# Patient Record
Sex: FEMALE | Race: WHITE | NOT HISPANIC OR LATINO | Employment: OTHER | URBAN - METROPOLITAN AREA
[De-identification: names, ages, dates, MRNs, and addresses within clinical notes are randomized per-mention and may not be internally consistent; named-entity substitution may affect disease eponyms.]

---

## 2018-01-16 ENCOUNTER — TELEPHONE (OUTPATIENT)
Dept: OBSTETRICS AND GYNECOLOGY | Facility: CLINIC | Age: 48
End: 2018-01-16

## 2018-01-17 ENCOUNTER — HOSPITAL ENCOUNTER (OUTPATIENT)
Dept: RADIOLOGY | Facility: OTHER | Age: 48
Discharge: HOME | End: 2018-01-17
Attending: OBSTETRICS & GYNECOLOGY
Payer: COMMERCIAL

## 2018-01-17 ENCOUNTER — OFFICE VISIT (OUTPATIENT)
Dept: OBSTETRICS AND GYNECOLOGY | Facility: CLINIC | Age: 48
End: 2018-01-17
Attending: OBSTETRICS & GYNECOLOGY
Payer: COMMERCIAL

## 2018-01-17 VITALS
DIASTOLIC BLOOD PRESSURE: 78 MMHG | SYSTOLIC BLOOD PRESSURE: 112 MMHG | BODY MASS INDEX: 20.94 KG/M2 | WEIGHT: 118.19 LBS | HEIGHT: 63 IN

## 2018-01-17 DIAGNOSIS — N92.6 IRREGULAR MENSES: ICD-10-CM

## 2018-01-17 DIAGNOSIS — Z12.4 SCREENING FOR CERVICAL CANCER: ICD-10-CM

## 2018-01-17 DIAGNOSIS — Z12.39 SCREENING FOR BREAST CANCER: ICD-10-CM

## 2018-01-17 DIAGNOSIS — N95.1 MENOPAUSAL SYMPTOM: Primary | ICD-10-CM

## 2018-01-17 DIAGNOSIS — Z01.419 ENCOUNTER FOR GYNECOLOGICAL EXAMINATION WITHOUT ABNORMAL FINDING: ICD-10-CM

## 2018-01-17 PROCEDURE — 77067 SCR MAMMO BI INCL CAD: CPT | Mod: 26,,, | Performed by: RADIOLOGY

## 2018-01-17 PROCEDURE — 88175 CYTOPATH C/V AUTO FLUID REDO: CPT

## 2018-01-17 PROCEDURE — 99386 PREV VISIT NEW AGE 40-64: CPT | Mod: S$GLB,,, | Performed by: OBSTETRICS & GYNECOLOGY

## 2018-01-17 PROCEDURE — 77067 SCR MAMMO BI INCL CAD: CPT | Mod: TC

## 2018-01-17 PROCEDURE — 77063 BREAST TOMOSYNTHESIS BI: CPT | Mod: 26,,, | Performed by: RADIOLOGY

## 2018-01-17 RX ORDER — NORGESTIMATE AND ETHINYL ESTRADIOL 0.25-0.035
KIT ORAL
COMMUNITY
Start: 2018-01-05 | End: 2018-01-29

## 2018-01-17 RX ORDER — NORGESTIMATE AND ETHINYL ESTRADIOL 0.25-0.035
1 KIT ORAL DAILY
Qty: 30 TABLET | Refills: 11 | Status: SHIPPED | OUTPATIENT
Start: 2018-01-17 | End: 2018-01-29

## 2018-01-17 RX ORDER — SUMATRIPTAN SUCCINATE 100 MG/1
100 TABLET ORAL ONCE
COMMUNITY
Start: 2017-11-14 | End: 2021-12-06 | Stop reason: SDUPTHER

## 2018-01-17 RX ORDER — SUMATRIPTAN SUCCINATE 100 MG/1
100 TABLET ORAL ONCE AS NEEDED
Qty: 30 TABLET | Refills: 2 | Status: SHIPPED | OUTPATIENT
Start: 2018-01-17 | End: 2018-01-17

## 2018-01-17 RX ORDER — CLINDAMYCIN PHOSPHATE AND BENZOYL PEROXIDE 10; 50 MG/G; MG/G
GEL TOPICAL
COMMUNITY
Start: 2018-01-11 | End: 2019-04-04

## 2018-01-17 RX ORDER — SPIRONOLACTONE 100 MG/1
100 TABLET, FILM COATED ORAL DAILY
COMMUNITY
Start: 2018-01-11

## 2018-01-17 NOTE — PROGRESS NOTES
SUBJECTIVE:   47 y.o. female No obstetric history on file.  for annual routine Pap and checkup. Patient's last menstrual period was 2018 (within days)..  She reports worseining migraines. For years, she reports adding Estradiol patch the week of her period- she now has spotting and migraines are worse. She is also on OCPs. She reports hot flashes and night sweats the week of her period. .        History reviewed. No pertinent past medical history.  Past Surgical History:   Procedure Laterality Date     SECTION      triplets     Social History     Social History    Marital status:      Spouse name: N/A    Number of children: N/A    Years of education: N/A     Occupational History    Not on file.     Social History Main Topics    Smoking status: Never Smoker    Smokeless tobacco: Never Used    Alcohol use No    Drug use: No    Sexual activity: Yes     Partners: Male     Other Topics Concern    Not on file     Social History Narrative    No narrative on file     Family History   Problem Relation Age of Onset    Breast cancer Maternal Grandmother      great granmother      OB History   No data available         Current Outpatient Prescriptions   Medication Sig Dispense Refill    ESTRADIOL TRANSDERMAL PATCH TD Place onto the skin.      clindamycin-benzoyl peroxide gel       MONO-LINYAH 0.25-35 mg-mcg per tablet       spironolactone (ALDACTONE) 100 MG tablet       sumatriptan (IMITREX) 100 MG tablet        No current facility-administered medications for this visit.      Allergies: Bactrim [sulfamethoxazole-trimethoprim]     ROS:  Constitutional: no weight loss, weight gain, fever, fatigue  Eyes:  No vision changes, glasses/contacts  ENT/Mouth: No ulcers, sinus problems, ears ringing, +headache  Cardiovascular: No inability to lie flat, chest pain, exercise intolerance, swelling, heart palpitations  Respiratory: No wheezing, coughing blood, shortness of breath, or  cough  Gastrointestinal: No diarrhea, bloody stool, nausea/vomiting, constipation, gas, hemorrhoids  Genitourinary: No blood in urine, painful urination, urgency of urination, frequency of urination, incomplete emptying, incontinence, abnormal bleeding, painful periods, heavy periods, vaginal discharge, vaginal odor, painful intercourse, sexual problems, bleeding after intercourse.  Musculoskeletal: No muscle weakness  Skin/Breast: No painful breasts, nipple discharge, masses, rash, ulcers  Neurological: No passing out, seizures, numbness, headache  Endocrine: No diabetes, hypothyroid, hyperthyroid,+ hot flashes, hair loss, abnormal hair growth, acne  Psychiatric: No depression, crying  Hematologic: No bruises, bleeding, swollen lymph nodes, anemia.      Physical Exam:   Constitutional: She is oriented to person, place, and time. She appears well-developed and well-nourished.      Neck: Normal range of motion. No tracheal deviation present. No thyromegaly present.    Cardiovascular: Exam reveals no edema.     Pulmonary/Chest: Effort normal. She exhibits no mass, no tenderness, no deformity and no retraction. Right breast exhibits no inverted nipple, no mass, no nipple discharge, no skin change, no tenderness, presence, no bleeding and no swelling. Left breast exhibits no inverted nipple, no mass, no nipple discharge, no skin change, no tenderness, presence, no bleeding and no swelling. Breasts are symmetrical.        Abdominal: Soft. She exhibits no distension and no mass. There is no tenderness. There is no rebound and no guarding. No hernia. Hernia confirmed negative in the left inguinal area.     Genitourinary: Vagina normal and uterus normal. Rectal exam shows no external hemorrhoid. There is no rash, tenderness or lesion on the right labia. There is no rash, tenderness or lesion on the left labia. Uterus is not deviated. Cervix is normal. No no adexnal prolapse. Right adnexum displays no mass, no tenderness and  no fullness. Left adnexum displays no mass, no tenderness and no fullness. No tenderness, bleeding, rectocele, cystocele or unspecified prolapse of vaginal walls in the vagina. No vaginal discharge found. Cervix exhibits no motion tenderness, no discharge and no friability.           Musculoskeletal: Normal range of motion and moves all extremeties. She exhibits no edema.      Lymphadenopathy:        Right: No inguinal adenopathy present.        Left: No inguinal adenopathy present.    Neurological: She is alert and oriented to person, place, and time.    Skin: No rash noted. No erythema. No pallor.    Psychiatric: She has a normal mood and affect. Her behavior is normal. Judgment and thought content normal.         ASSESSMENT:   well woman  no contraindication to continue use of oral contraceptives    PLAN:   mammogram  pap smear  Stop OCPs for 1 month then get labs  Patient may benefit from progesterone for her headaches

## 2018-01-22 ENCOUNTER — HOSPITAL ENCOUNTER (OUTPATIENT)
Dept: RADIOLOGY | Facility: OTHER | Age: 48
Discharge: HOME | End: 2018-01-22
Attending: OBSTETRICS & GYNECOLOGY
Payer: COMMERCIAL

## 2018-01-22 DIAGNOSIS — R92.8 ABNORMAL MAMMOGRAM: ICD-10-CM

## 2018-01-22 PROCEDURE — 77065 DX MAMMO INCL CAD UNI: CPT | Mod: TC

## 2018-01-22 PROCEDURE — 76642 ULTRASOUND BREAST LIMITED: CPT | Mod: 26,RT,, | Performed by: RADIOLOGY

## 2018-01-22 PROCEDURE — 77061 BREAST TOMOSYNTHESIS UNI: CPT | Mod: 26,,, | Performed by: RADIOLOGY

## 2018-01-22 PROCEDURE — 76642 ULTRASOUND BREAST LIMITED: CPT | Mod: TC,RT

## 2018-01-22 PROCEDURE — 77065 DX MAMMO INCL CAD UNI: CPT | Mod: 26,,, | Performed by: RADIOLOGY

## 2018-01-22 PROCEDURE — 77061 BREAST TOMOSYNTHESIS UNI: CPT | Mod: TC

## 2018-01-24 ENCOUNTER — TELEPHONE (OUTPATIENT)
Dept: OBSTETRICS AND GYNECOLOGY | Facility: CLINIC | Age: 48
End: 2018-01-24

## 2018-01-24 NOTE — TELEPHONE ENCOUNTER
Pt states she would like to schedule her blood work. Pt states will take her last pill on Saturday . Scheduled pt to have blood work on 2/26 at 8am

## 2018-01-24 NOTE — TELEPHONE ENCOUNTER
----- Message from Markus Copeland sent at 1/23/2018  4:49 PM CST -----  Contact: Pt  X_ 1st Request  _ 2nd Request  _ 3rd Request    Who: KAL GOETZ [[id number]]    Why: Patient stated she needed to inform the staff that Saturday she will be 21 days in. Would like to know when to schedule blood work    What Number to Call Back: [phone]    When to Expect a call back: (Before the end of the day)  -- if call after 3:00 call back will be tomorrow.

## 2018-01-25 ENCOUNTER — HOSPITAL ENCOUNTER (OUTPATIENT)
Dept: RADIOLOGY | Facility: OTHER | Age: 48
Discharge: HOME | End: 2018-01-25
Attending: OBSTETRICS & GYNECOLOGY
Payer: COMMERCIAL

## 2018-01-25 DIAGNOSIS — N63.0 LUMP OR MASS IN BREAST: Primary | ICD-10-CM

## 2018-01-25 DIAGNOSIS — R92.8 ABNORMAL MAMMOGRAM: ICD-10-CM

## 2018-01-25 PROCEDURE — 77065 DX MAMMO INCL CAD UNI: CPT | Mod: 26,RT,, | Performed by: RADIOLOGY

## 2018-01-25 PROCEDURE — 19083 BX BREAST 1ST LESION US IMAG: CPT

## 2018-01-25 PROCEDURE — 77065 DX MAMMO INCL CAD UNI: CPT | Mod: TC,RT

## 2018-01-25 PROCEDURE — 88360 TUMOR IMMUNOHISTOCHEM/MANUAL: CPT | Mod: 26,,, | Performed by: PATHOLOGY

## 2018-01-25 PROCEDURE — 19083 BX BREAST 1ST LESION US IMAG: CPT | Mod: RT,,, | Performed by: RADIOLOGY

## 2018-01-25 PROCEDURE — 88305 TISSUE EXAM BY PATHOLOGIST: CPT | Performed by: PATHOLOGY

## 2018-01-25 PROCEDURE — 88305 TISSUE EXAM BY PATHOLOGIST: CPT | Mod: 26,,, | Performed by: PATHOLOGY

## 2018-01-25 PROCEDURE — 88360 TUMOR IMMUNOHISTOCHEM/MANUAL: CPT | Performed by: PATHOLOGY

## 2018-01-25 PROCEDURE — 27201044 US BREAST BIOPSY WITH IMAGING 1ST SITE RIGHT

## 2018-01-25 PROCEDURE — 25000003 PHARM REV CODE 250: Performed by: OBSTETRICS & GYNECOLOGY

## 2018-01-25 RX ORDER — LIDOCAINE HYDROCHLORIDE AND EPINEPHRINE 20; 10 MG/ML; UG/ML
10 INJECTION, SOLUTION INFILTRATION; PERINEURAL ONCE
Status: COMPLETED | OUTPATIENT
Start: 2018-01-25 | End: 2018-01-25

## 2018-01-25 RX ORDER — LIDOCAINE HYDROCHLORIDE 10 MG/ML
5 INJECTION INFILTRATION; PERINEURAL ONCE
Status: COMPLETED | OUTPATIENT
Start: 2018-01-25 | End: 2018-01-25

## 2018-01-25 RX ADMIN — LIDOCAINE HYDROCHLORIDE AND EPINEPHRINE 10 ML: 20; 10 INJECTION, SOLUTION INFILTRATION; PERINEURAL at 02:01

## 2018-01-25 RX ADMIN — LIDOCAINE HYDROCHLORIDE 5 ML: 10 INJECTION, SOLUTION INFILTRATION; PERINEURAL at 02:01

## 2018-01-25 NOTE — PLAN OF CARE
Pt stable after US Guided Biopsy of RIGHT Breast. No visible bleeding at site. Biopsy site covered with steri-strips, CDI. Pt escorted to post op mammography exam by nurse and family. Post biopsy education discussed prior to biopsy by nurse and will be readdressed by mammography tech before discharge. Pt verbalized instructions given by nurse. Betty

## 2018-01-29 ENCOUNTER — OFFICE VISIT (OUTPATIENT)
Dept: OBSTETRICS AND GYNECOLOGY | Facility: CLINIC | Age: 48
End: 2018-01-29
Attending: OBSTETRICS & GYNECOLOGY
Payer: COMMERCIAL

## 2018-01-29 ENCOUNTER — LAB VISIT (OUTPATIENT)
Dept: LAB | Facility: OTHER | Age: 48
End: 2018-01-29
Attending: OBSTETRICS & GYNECOLOGY
Payer: COMMERCIAL

## 2018-01-29 ENCOUNTER — TELEPHONE (OUTPATIENT)
Dept: RADIOLOGY | Facility: OTHER | Age: 48
End: 2018-01-29

## 2018-01-29 ENCOUNTER — OFFICE VISIT (OUTPATIENT)
Dept: SURGERY | Facility: CLINIC | Age: 48
End: 2018-01-29
Payer: COMMERCIAL

## 2018-01-29 VITALS — BODY MASS INDEX: 19.28 KG/M2 | HEIGHT: 64 IN

## 2018-01-29 VITALS
BODY MASS INDEX: 19.9 KG/M2 | SYSTOLIC BLOOD PRESSURE: 119 MMHG | WEIGHT: 112.31 LBS | TEMPERATURE: 97 F | DIASTOLIC BLOOD PRESSURE: 76 MMHG | HEIGHT: 63 IN | HEART RATE: 76 BPM

## 2018-01-29 DIAGNOSIS — Z13.79 GENETIC TESTING: ICD-10-CM

## 2018-01-29 DIAGNOSIS — Z71.83 ENCOUNTER FOR NONPROCREATIVE GENETIC COUNSELING: ICD-10-CM

## 2018-01-29 DIAGNOSIS — C50.811 MALIGNANT NEOPLASM OF OVERLAPPING SITES OF RIGHT FEMALE BREAST, UNSPECIFIED ESTROGEN RECEPTOR STATUS: Primary | ICD-10-CM

## 2018-01-29 DIAGNOSIS — C50.911 MALIGNANT NEOPLASM OF RIGHT FEMALE BREAST, UNSPECIFIED ESTROGEN RECEPTOR STATUS, UNSPECIFIED SITE OF BREAST: Primary | ICD-10-CM

## 2018-01-29 DIAGNOSIS — C50.911 MALIGNANT NEOPLASM OF RIGHT FEMALE BREAST, UNSPECIFIED ESTROGEN RECEPTOR STATUS, UNSPECIFIED SITE OF BREAST: ICD-10-CM

## 2018-01-29 PROCEDURE — 99214 OFFICE O/P EST MOD 30 MIN: CPT | Mod: S$GLB,,, | Performed by: OBSTETRICS & GYNECOLOGY

## 2018-01-29 PROCEDURE — 99205 OFFICE O/P NEW HI 60 MIN: CPT | Mod: S$GLB,,, | Performed by: SURGERY

## 2018-01-29 PROCEDURE — 3008F BODY MASS INDEX DOCD: CPT | Mod: S$GLB,,, | Performed by: OBSTETRICS & GYNECOLOGY

## 2018-01-29 PROCEDURE — 3008F BODY MASS INDEX DOCD: CPT | Mod: S$GLB,,, | Performed by: SURGERY

## 2018-01-29 PROCEDURE — 36415 COLL VENOUS BLD VENIPUNCTURE: CPT

## 2018-01-29 NOTE — Clinical Note
Amanda,   So glad to see her receptors have resulted ER+CA+H2N-. Thanks again for sending her and for getting her genetics sent right away!  That will really help with planning.  ThanksLuz Marina

## 2018-01-31 NOTE — PROGRESS NOTES
"  Chief Complaint:  Genetic testing      History of Present Illness  Roberta Wheat is a 47 y.o.  Female seen today in clinic.   She has recently been diagnosed with infiltrating ductal carcinoma of the right breast.She met with Dr. Montiel earlier today- she is awaiting receptor status. . She has a maternal great grandmother who had breast cancer is her 70's.       Additional Information:      Height:  5'4"  Weight: 112 pounds  Menarche age:11  First live birth age:32 triplets  Oral contraceptive use yes  HRT use: no   Date of Last Mammogram:2018 (screening)  Biopsy- infiltrating ductal carcinoma right breast      History reviewed. No pertinent past medical history.  Past Surgical History:   Procedure Laterality Date     SECTION      triplets     Social History     Social History    Marital status:      Spouse name: N/A    Number of children: N/A    Years of education: N/A     Occupational History    Not on file.     Social History Main Topics    Smoking status: Never Smoker    Smokeless tobacco: Never Used    Alcohol use No    Drug use: No    Sexual activity: Yes     Partners: Male     Other Topics Concern    Not on file     Social History Narrative    No narrative on file     Family History   Problem Relation Age of Onset    Breast cancer Maternal Grandmother      great granmother     Cancer Neg Hx     Colon cancer Neg Hx     Ovarian cancer Neg Hx      OB History    Para Term  AB Living   2 2 2         SAB TAB Ectopic Multiple Live Births                  # Outcome Date GA Lbr Mike/2nd Weight Sex Delivery Anes PTL Lv   2 Term            1 Term                     Current Outpatient Prescriptions   Medication Sig Dispense Refill    clindamycin-benzoyl peroxide gel       spironolactone (ALDACTONE) 100 MG tablet       sumatriptan (IMITREX) 100 MG tablet        No current facility-administered medications for this visit.      Allergies: Bactrim " [sulfamethoxazole-trimethoprim]         Review of Systems  Review of Systems     ROS:  Constitutional: no weight loss, weight gain, fever, fatigue  Gastrointestinal: No diarrhea, bloody stool, nausea/vomiting, constipation, gas, hemorrhoids  Genitourinary: No blood in urine, painful urination, urgency of urination, frequency of urination, incomplete emptying, incontinence, abnormal bleeding, painful periods, heavy periods, vaginal discharge, vaginal odor, painful intercourse, sexual problems, bleeding after intercourse.  Musculoskeletal: No muscle weakness  Skin/Breast: No painful breasts, nipple discharge, masses, rash, ulcers  Neurological: No passing out, seizures, numbness, headache  Endocrine: No diabetes, hypothyroid, hyperthyroid, hot flashes, hair loss, abnormal hair growth, acne  Psychiatric: No depression, crying  Hematologic: No bruises, bleeding, swollen lymph nodes, anemia.       Objective:    Physical Exam     deferred  Assessment & Plan:       This patients  personal history  is suggestive of a hereditary cancer syndrome. She meets the criteria for genetic testing established by medical society guidelines and I recommended she pursue testing based on   these criteria.     Face to face time spent with patient 25 minutes  More than 90% of time spent in counseling and coordinating follow up for patient.    Today we discussed how genetics may affect cancer susceptibility and the options, alternatives, benefits, limitations and   potential outcomes of genetic testing for hereditary cancer syndromes. The genetic test recommended for this patient today is the [employer] ®     Hereditary Cancer Panel. [facility] analyzes 28 genes including BRCA1, BRCA2,   MLH1, MSH2, MSH6, and PMS2 to identify germline genetic mutations that cause an increased risk for eight primary   cancers including: breast, ovarian, endometrial, colorectal, gastric, pancreatic, prostate, and melanomas. Due to significant clinical  overlap in hereditary cancer susceptibility genes, a molecular diagnosis of a hereditary cancer condition is necessary to clarify the cancer risks this patient carries and the screening, management, and treatment options most   appropriate for her.   In our pre-test counseling, today, we discussed the possible results of genetic testing: a positive result would mean that a mutation known to be associated with a higher risk of cancer was identified and a negative result would mean that no mutation known to increase the risk of cancer was identified. We also discussed that a variant of uncertain significance   (VUS) may be reported. A VUS is a genetic change identified in a gene, but it is unclear if this change causes an increase   in cancer risk normally associated with mutations in the gene. Due to the clinical uncertainty of this type of change, VUSs   are not used to make medical management decisions for a patient. Finally, I advised the patient that her medical management may change based on these results and may include increased surveillance, use of chemoprevention, prophylactic surgery, or modifications to lifestyle.   After our discussion, the patient elected to proceed with the [facility]   ® Hereditary Cancer Panel test today and she was given information on this test to keep for her records. The patient provided informed consent. She understands the potential results of this testing and their implications for medical management and relatives. A blood sample was collected, and sent to [facility].      A total of 25 minutes were spent discussing genetic testing and hereditary cancer syndromes with the patient today.        Plan:      Follow up for results as scheduled

## 2018-02-02 DIAGNOSIS — I10 HYPERTENSION, UNSPECIFIED TYPE: Primary | ICD-10-CM

## 2018-02-05 ENCOUNTER — LAB VISIT (OUTPATIENT)
Dept: LAB | Facility: OTHER | Age: 48
End: 2018-02-05
Attending: SURGERY
Payer: COMMERCIAL

## 2018-02-05 DIAGNOSIS — I10 HYPERTENSION, UNSPECIFIED TYPE: ICD-10-CM

## 2018-02-05 PROBLEM — C50.811 MALIGNANT NEOPLASM OF OVERLAPPING SITES OF RIGHT FEMALE BREAST: Status: ACTIVE | Noted: 2018-02-05

## 2018-02-05 LAB
BUN SERPL-MCNC: 16 MG/DL
CREAT SERPL-MCNC: 0.8 MG/DL
EST. GFR  (AFRICAN AMERICAN): >60 ML/MIN/1.73 M^2
EST. GFR  (NON AFRICAN AMERICAN): >60 ML/MIN/1.73 M^2

## 2018-02-05 PROCEDURE — 84520 ASSAY OF UREA NITROGEN: CPT

## 2018-02-05 PROCEDURE — 36415 COLL VENOUS BLD VENIPUNCTURE: CPT

## 2018-02-05 PROCEDURE — 82565 ASSAY OF CREATININE: CPT

## 2018-02-05 NOTE — PROGRESS NOTES
Breast Surgery  [facility]  Department of Surgery      REFERRING PROVIDER: Luz Marina Montiel MD  [address] [place] [address]    Chief Complaint: Consult      Subjective:      Patient ID: Roberta Wheat is a 47 y.o. female who presents with newly diagnosed IDC, grade II, receptors pending.      Patient does not routinely do self breast exams.  Patient has not noted a change on breast exam.  Patient denies nipple discharge. Patient denies to previous breast biopsy. Patient denies a personal history of breast cancer.    Findings at that time were the following:   Tumor size: 1.8 cm   Tumor rdgrdrrdarddrderd:rd rd3rd Estrogen Receptor: ?   Progesterone Receptor: ?   Her-2 britta: ?   Lymph node status: clinically negative   Lymphatic invasion: no     GYN History:  Age of menarche was 11. Pre-menopausal. Patient admits to hormonal therapy for fertility difficulty. She quit taking OCPs last week with biopsy.  She typically uses an estradiol patch the week of her cycles.  Patient is , triplet girls and one miscarriage. Age of first live birth was 32.     Past Medical History:   Diagnosis Date    Migraine      Past Surgical History:   Procedure Laterality Date     SECTION      triplets     Current Outpatient Prescriptions on File Prior to Visit   Medication Sig Dispense Refill    clindamycin-benzoyl peroxide gel       spironolactone (ALDACTONE) 100 MG tablet       sumatriptan (IMITREX) 100 MG tablet        No current facility-administered medications on file prior to visit.      Social History     Social History    Marital status:      Spouse name: N/A    Number of children: N/A    Years of education: N/A     Occupational History    Not on file.     Social History Main Topics    Smoking status: Never Smoker    Smokeless tobacco: Never Used    Alcohol use No    Drug use: No    Sexual activity: Yes     Partners: Male     Other Topics Concern    Not on file     Social History  "Narrative    No narrative on file     Family History   Problem Relation Age of Onset    Breast cancer Maternal Grandmother      great granmother     Cancer Neg Hx     Colon cancer Neg Hx     Ovarian cancer Neg Hx         Review of Systems   Constitutional: Negative for appetite change, chills, fever and unexpected weight change.   HENT: Negative for facial swelling, postnasal drip and sore throat.    Eyes: Negative for redness and itching.   Respiratory: Negative for chest tightness and shortness of breath.    Cardiovascular: Negative for chest pain and palpitations.   Gastrointestinal: Negative for blood in stool, diarrhea, nausea and vomiting.   Genitourinary: Negative for difficulty urinating and dysuria.   Musculoskeletal: Negative for arthralgias and joint swelling.   Skin: Negative for rash and wound.   Neurological: Negative for dizziness and syncope.   Hematological: Negative for adenopathy.   Psychiatric/Behavioral: Negative for agitation. The patient is not nervous/anxious.      Objective:   /76 (BP Location: Right arm, Patient Position: Sitting, BP Method: Medium (Automatic))   Pulse 76   Temp 96.9 °F (36.1 °C) (Oral)   Ht 5' 3" (1.6 m)   Wt 51 kg (112 lb 5.2 oz)   LMP 01/04/2018   BMI 19.90 kg/m²      Physical Exam   Constitutional: She appears well-developed and well-nourished.   HENT:   Head: Normocephalic.   Eyes: No scleral icterus.   Neck: Neck supple. No tracheal deviation present.   Cardiovascular: Normal rate and regular rhythm.    Pulmonary/Chest: Breath sounds normal. No respiratory distress. Right breast exhibits no inverted nipple, no mass, no nipple discharge and no skin change. Left breast exhibits no inverted nipple, no mass, no nipple discharge and no skin change.       Abdominal: Soft. She exhibits no mass. There is no tenderness.   Musculoskeletal: She exhibits no edema.   Lymphadenopathy:     She has no cervical adenopathy.   Neurological: She is alert.   Skin: No rash " noted. No erythema.     Psychiatric: She has a normal mood and affect.       Radiology review: Images personally reviewed by me in the clinic.   Mammogram:Findings:  This procedure was performed using tomosynthesis.  Computer-aided detection was utilized in the interpretation of this examination.  The breast is extremely dense, which lowers the sensitivity of mammography.      Mammo Digital Diagnostic Right with Tomosynthesis_CAD  Right  Mass: There is an 18 mm mass with spiculated margins seen in the right breast at 12 o'clock. The mass correlates with the screening mammogram finding.      US Breast Right Limited  Right  Mass: There is a 9 mm x 7 mm x 8 mm irregularly shaped, hypoechoic mass with angular margins seen in the right breast at 12 o'clock in the middle depth, 3 cm from the nipple. The mass correlates with the mammogram finding.      Impression:  Right  Mass: Right breast 18 mm mass at the 12 o'clock position. Assessment: 4B - Suspicious finding. Ultrasound Biopsy is recommended.      The findings and recommendations were discussed in detail with the patient.      BI-RADS Category:   Right: 4B - Moderate Suspicion for Malignancy  Overall: 4B - Moderate Suspicion for Malignancy     Recommendation:  Biopsy is recommended.     The patient's estimated lifetime risk of breast cancer (to age 85) based on Tyrer-Cuzick - 7 risk assessment model is: Tyrer-Cuzick: 14.94 %. According to the American Cancer Society,  patients with a lifetime breast cancer risk of 20% or higher might benefit from supplemental screening tests.      PATH:  FINAL PATHOLOGIC DIAGNOSIS  BREAST (RIGHT 12:00, CLINICAL): INVASIVE DUCTAL CARCINOMA.  Note: Ribbon sections of the specimen were examined microscopically at deeper levels.  The invasive tumor appears moderately differentiated (3, 3, 1) and is represented in 2 biopsies with the largest  9mm. There are also areas of carcinoma in situ. Hormone receptor assay results will be issued as  a supplemental  report.      Assessment:       1. Malignant neoplasm of overlapping sites of right female breast, unspecified estrogen receptor status        Plan:     Options for management were discussed with the patient and her family. We reviewed the existing data noting the equivalency of breast conserving surgery with radiation therapy and mastectomy. We also reviewed the guidelines of the National Comprehensive Cancer Network for Stage IA breast carcinoma. We discussed the need for lumpectomy margins to be negative for carcinoma, the necessity for postoperative radiation therapy after breast conservation in most cases, the possibility of a failed or false negative sentinel lymph node biopsy and the potential need for complete lymphadenectomy for a failed or positive sentinel lymph node biopsy were fully discussed. In the setting of mastectomy, delayed or immediate reconstruction options are available and were discussed.     In the setting of lumpectomy, radiation therapy would be recommended majority of the time.  The duration and treatment side effects were discussed with the patient.  This will coordinated with the radiation oncologist pending final pathology.    We also discussed the role of systemic therapy in the treatment of early stage breast cancer.  We discussed that this is based on tumor biology and shannan status and will be determined based on final pathology.  We discussed that if the cancer is hormone positive, endocrine therapy would be recommended in most cases and its use can reduce the risk of recurrence as well as improve survival. Side effects of treatment were briefly discussed. We also discussed the potential role for chemotherapy based on a number of factors such as tumor phenotype (ER+ vs. triple negative vs. [id number]+) and this would be determined in coordination with the medical oncologist.    The patient, in consultation with her family, has elected to proceed with MRI due to her  breast density, the size discrepancy from MMG to US and she may be considering nipple sparing mastectomy.    Receptors pending - Will follow up.  Genetic testing to be performed - Dr. Morris - already established.  She is undecided on what she would choose surgically but understands that her receptors could heavily influence our plan and so could her genetic results.    Patient was educated on breast cancer, receptors, wire localization lumpectomy, mastectomy, sentinel lymph node mapping and biopsy, axillary lymph node dissection, reconstruction, breast prosthesis with post-mastectomy bra and radiation therapy. Patient was given patient information binder including [facility] breast cancer treatment brochure.  All her questions were answered.    Total time spent with the patient: 65 minutes.  45 minutes of face to face consultation and 20 minutes of chart review and coordination of care.

## 2018-02-09 ENCOUNTER — HOSPITAL ENCOUNTER (OUTPATIENT)
Dept: RADIOLOGY | Facility: OTHER | Age: 48
Discharge: HOME | End: 2018-02-09
Attending: SURGERY
Payer: COMMERCIAL

## 2018-02-09 DIAGNOSIS — C50.811 MALIGNANT NEOPLASM OF OVERLAPPING SITES OF RIGHT FEMALE BREAST, UNSPECIFIED ESTROGEN RECEPTOR STATUS: ICD-10-CM

## 2018-02-09 PROCEDURE — 77059 MRI BREAST BILATERAL: CPT | Mod: 26,,, | Performed by: RADIOLOGY

## 2018-02-09 PROCEDURE — 25500020 PHARM REV CODE 255: Performed by: SURGERY

## 2018-02-09 PROCEDURE — 0159T MRI BREAST BILATERAL: CPT | Mod: 26,,, | Performed by: RADIOLOGY

## 2018-02-09 PROCEDURE — A9577 INJ MULTIHANCE: HCPCS | Performed by: SURGERY

## 2018-02-09 PROCEDURE — 0159T MRI BREAST BILATERAL: CPT | Mod: TC

## 2018-02-09 RX ADMIN — GADOBENATE DIMEGLUMINE 10 ML: 529 INJECTION, SOLUTION INTRAVENOUS at 02:02

## 2018-02-11 ENCOUNTER — PATIENT MESSAGE (OUTPATIENT)
Dept: OBSTETRICS AND GYNECOLOGY | Facility: CLINIC | Age: 48
End: 2018-02-11

## 2018-02-12 ENCOUNTER — TELEPHONE (OUTPATIENT)
Dept: SURGERY | Facility: CLINIC | Age: 48
End: 2018-02-12

## 2018-02-12 NOTE — TELEPHONE ENCOUNTER
----- Message from Luz Marina Montiel MD sent at 2/10/2018 12:53 PM CST -----  Arianna,     They did not complete the MRI read on this patient, but she will be expecting a call..  If possible can you check on this to let her know this week?  Was supposedly a small tumor but looks like there is a little more to the story on the MRI (not sure).  If so, could ask Nick to call her or I can send her a message if you aren't comfortable, but if a large area is affected and we need to go the mastectomy route or obtain another biopsy, she needs to know so that we can get those things scheduled.    THanks!  Luz Marina

## 2018-02-12 NOTE — TELEPHONE ENCOUNTER
"Called patient to discuss MRI results. Patient is at [place], she states she would still like to go over the results right now.      Went over the known finding and that on the MRI it was measured around 12mm, which is slightly larger then the US measurement of 9mm. Explained to patient that it is common to have slight differences in size between imaging modalities.     Then discussed area surrounding known mass and that it was described as a "non-mass enhancement." Discussed what this means, the diameter of this area as measured by the MRI, why it may not have shown up on US or mammogram. We discussed that biopsy was recommended if patient decides to pursue lumpectomy, to determine if this entire area needs to be removed. Also discussed that this goes to the nipple, and may cause a nipple sparing mastectomy to be impossible if it is proven to be malignant. Biopsy would be needed in order to determine if nipple sparing mastectomy is possible.     Finally, discussed additional LIQ area also described as NME. Explained to patient that this will also need to be biopsied if lumpectomy is desired. We discussed the possibility of two localized lumpectomies, depending on biopsy results and Dr. Montiel's opinion of whether or not they can both be removed and still produce a pleasing cosmetic result.     Stressed to patient that the two main positive results in this MRI were that the contralateral breast did not show any abnormalities, and there was no abnormal lymph nodes visualized.     Offered patient the option of speaking with one of Dr. Montiel's colleagues on Wednesday regarding these two new areas. Also offered to set patient up to come back next week and talk to Dr. Montiel in person.     Patient is in [place] until Friday. Patient requests that I send her a copy of the report, so she can look at it with her , and also provide my contact information so she can call me back to let me know what she would " like to do.

## 2018-02-15 ENCOUNTER — TELEPHONE (OUTPATIENT)
Dept: OBSTETRICS AND GYNECOLOGY | Facility: CLINIC | Age: 48
End: 2018-02-15

## 2018-02-15 NOTE — TELEPHONE ENCOUNTER
----- Message from Mily Morris MD sent at 2/11/2018  9:57 AM CST -----  Please mail her the results and fax them to Dr. Montiel.  Thank you

## 2018-02-15 NOTE — TELEPHONE ENCOUNTER
Copy of [facility] genetic test results sent via mail to Roberta Wheat .  Second copy sent for scanning into EMR.    Faxed a copy to Dr. Montiel.

## 2018-02-20 ENCOUNTER — OFFICE VISIT (OUTPATIENT)
Dept: SURGERY | Facility: CLINIC | Age: 48
End: 2018-02-20
Payer: COMMERCIAL

## 2018-02-20 VITALS
HEART RATE: 67 BPM | DIASTOLIC BLOOD PRESSURE: 66 MMHG | WEIGHT: 114.88 LBS | TEMPERATURE: 99 F | BODY MASS INDEX: 19.61 KG/M2 | SYSTOLIC BLOOD PRESSURE: 105 MMHG | HEIGHT: 64 IN

## 2018-02-20 DIAGNOSIS — C50.811 MALIGNANT NEOPLASM OF OVERLAPPING SITES OF RIGHT BREAST IN FEMALE, ESTROGEN RECEPTOR POSITIVE: Primary | ICD-10-CM

## 2018-02-20 DIAGNOSIS — Z17.0 MALIGNANT NEOPLASM OF OVERLAPPING SITES OF RIGHT BREAST IN FEMALE, ESTROGEN RECEPTOR POSITIVE: Primary | ICD-10-CM

## 2018-02-20 PROCEDURE — 99999 PR PBB SHADOW E&M-EST. PATIENT-LVL III: CPT | Mod: PBBFAC,,, | Performed by: SURGERY

## 2018-02-20 PROCEDURE — 3008F BODY MASS INDEX DOCD: CPT | Mod: S$GLB,,, | Performed by: SURGERY

## 2018-02-20 PROCEDURE — 99214 OFFICE O/P EST MOD 30 MIN: CPT | Mod: S$GLB,,, | Performed by: SURGERY

## 2018-02-21 ENCOUNTER — PATIENT MESSAGE (OUTPATIENT)
Dept: SURGERY | Facility: CLINIC | Age: 48
End: 2018-02-21

## 2018-02-21 ENCOUNTER — TELEPHONE (OUTPATIENT)
Dept: RADIOLOGY | Facility: HOSPITAL | Age: 48
End: 2018-02-21

## 2018-02-21 NOTE — TELEPHONE ENCOUNTER
Spoke with patient. reviewed breast MRI results.  Reviewed MRI  breast biopsy procedure and reviewed instructions for MRI breast biopsy. Patient expressed understanding and all questions were answered. Provided patient with my phone number to call for any further concerns or questions.   Patient scheduled MRI breast biopsy 3/2/18.

## 2018-02-24 NOTE — PROGRESS NOTES
Breast Surgery  [facility]  Department of Surgery      REFERRING PROVIDER: Dr Mily Morris  Chief Complaint: Follow-up      Subjective:      Patient ID: Roberta Wheat is a 47 y.o. female who returns with findings of additional suspicion for disease on MRI.  Her receptors returned as ER+VT+H2N-.  She is just returning from a trip to [place] and is here for further planning.  We discussed her MRI, I reviewed the images with her and reasoning for recommended additional biopsy.  We discussed nipple sparing technique at length with this enhancement heading towards the nipple.  We also discussed a variety of other physicians in town as she is planning for additional plastic surgery opinions.  She is set up for an appointment at [facility] and inquired about pre-pec vs sub pec implants and is considering who to see there.    SHe initially presented with newly diagnosed IDC, grade II, receptors pending.      Patient does not routinely do self breast exams.  Patient has not noted a change on breast exam.  Patient denies nipple discharge. Patient denies to previous breast biopsy. Patient denies a personal history of breast cancer.    Findings at that time were the following:   Tumor size: 1.8 cm   Tumor rdgrdrrdarddrderd:rd rd3rd Estrogen Receptor: ?   Progesterone Receptor: ?   Her-2 britta: ?   Lymph node status: clinically negative   Lymphatic invasion: no     GYN History:  Age of menarche was 11. Pre-menopausal. Patient admits to hormonal therapy for fertility difficulty. She quit taking OCPs last week with biopsy.  She typically uses an estradiol patch the week of her cycles.  Patient is , triplet girls and one miscarriage. Age of first live birth was 32.     Past Medical History:   Diagnosis Date    Migraine      Past Surgical History:   Procedure Laterality Date     SECTION      triplets     Current Outpatient Prescriptions on File Prior to Visit   Medication Sig Dispense Refill     "clindamycin-benzoyl peroxide gel       sumatriptan (IMITREX) 100 MG tablet Take 100 mg by mouth once.       spironolactone (ALDACTONE) 100 MG tablet Take 100 mg by mouth once daily.        No current facility-administered medications on file prior to visit.      Social History     Social History    Marital status:      Spouse name: N/A    Number of children: N/A    Years of education: N/A     Occupational History    Not on file.     Social History Main Topics    Smoking status: Never Smoker    Smokeless tobacco: Never Used    Alcohol use No    Drug use: No    Sexual activity: Yes     Partners: Male     Other Topics Concern    Not on file     Social History Narrative    No narrative on file     Family History   Problem Relation Age of Onset    Breast cancer Maternal Grandmother      great granmother     Cancer Neg Hx     Colon cancer Neg Hx     Ovarian cancer Neg Hx         Review of Systems   Constitutional: Negative for appetite change, chills, fever and unexpected weight change.   HENT: Negative for facial swelling, postnasal drip and sore throat.    Eyes: Negative for redness and itching.   Respiratory: Negative for chest tightness and shortness of breath.    Cardiovascular: Negative for chest pain and palpitations.   Gastrointestinal: Negative for blood in stool, diarrhea, nausea and vomiting.   Genitourinary: Negative for difficulty urinating and dysuria.   Musculoskeletal: Negative for arthralgias and joint swelling.   Skin: Negative for rash and wound.   Neurological: Negative for dizziness and syncope.   Hematological: Negative for adenopathy.   Psychiatric/Behavioral: Negative for agitation. The patient is not nervous/anxious.      Objective:   /66 (BP Location: Right arm, Patient Position: Sitting, BP Method: Small (Automatic))   Pulse 67   Temp 98.6 °F (37 °C)   Ht 5' 4" (1.626 m)   Wt 52.1 kg (114 lb 13.8 oz)   BMI 19.72 kg/m²     Physical Exam   Constitutional: She " appears well-developed and well-nourished.   HENT:   Head: Normocephalic.   Eyes: No scleral icterus.   Neck: Neck supple. No tracheal deviation present.   Cardiovascular: Normal rate and regular rhythm.    Pulmonary/Chest: Breath sounds normal. No respiratory distress. Right breast exhibits no inverted nipple, no mass, no nipple discharge and no skin change. Left breast exhibits no inverted nipple, no mass, no nipple discharge and no skin change.       Abdominal: Soft. She exhibits no mass. There is no tenderness.   Musculoskeletal: She exhibits no edema.   Lymphadenopathy:     She has no cervical adenopathy.   Neurological: She is alert.   Skin: No rash noted. No erythema.     Psychiatric: She has a normal mood and affect.       Radiology review: Images personally reviewed by me in the clinic.   Mammogram:Findings:  This procedure was performed using tomosynthesis.  Computer-aided detection was utilized in the interpretation of this examination.  The breast is extremely dense, which lowers the sensitivity of mammography.      Mammo Digital Diagnostic Right with Tomosynthesis_CAD  Right  Mass: There is an 18 mm mass with spiculated margins seen in the right breast at 12 o'clock. The mass correlates with the screening mammogram finding.      US Breast Right Limited  Right  Mass: There is a 9 mm x 7 mm x 8 mm irregularly shaped, hypoechoic mass with angular margins seen in the right breast at 12 o'clock in the middle depth, 3 cm from the nipple. The mass correlates with the mammogram finding.      Impression:  Right  Mass: Right breast 18 mm mass at the 12 o'clock position. Assessment: 4B - Suspicious finding. Ultrasound Biopsy is recommended.      The findings and recommendations were discussed in detail with the patient.      BI-RADS Category:   Right: 4B - Moderate Suspicion for Malignancy  Overall: 4B - Moderate Suspicion for Malignancy     Recommendation:  Biopsy is recommended.     The patient's estimated  lifetime risk of breast cancer (to age 85) based on Tyrer-Cuzick - 7 risk assessment model is: Tyrer-Cuzick: 14.94 %. According to the American Cancer Society,  patients with a lifetime breast cancer risk of 20% or higher might benefit from supplemental screening tests.      PATH:  FINAL PATHOLOGIC DIAGNOSIS  BREAST (RIGHT 12:00, CLINICAL): INVASIVE DUCTAL CARCINOMA.  Note: Ribbon sections of the specimen were examined microscopically at deeper levels.  The invasive tumor appears moderately differentiated (3, 3, 1) and is represented in 2 biopsies with the largest  9mm. There are also areas of carcinoma in situ. Hormone receptor assay results will be issued as a supplemental  report.  ER: Positive (Intermediate, % of tumor cell nuclei).  RI: Positive (Strong, % of tumor cell nuclei).  HER2: Negative (1+).  Ki-67: 30% .    Assessment:       48 y/o female with R breast IDC ++-  Plan:     Options for management were discussed with the patient and her family. We reviewed the existing data noting the equivalency of breast conserving surgery with radiation therapy and mastectomy. We also reviewed the guidelines of the National Comprehensive Cancer Network for Stage IA breast carcinoma. We discussed the need for lumpectomy margins to be negative for carcinoma, the necessity for postoperative radiation therapy after breast conservation in most cases, the possibility of a failed or false negative sentinel lymph node biopsy and the potential need for complete lymphadenectomy for a failed or positive sentinel lymph node biopsy were fully discussed. In the setting of mastectomy, delayed or immediate reconstruction options are available and were discussed.     In the setting of lumpectomy, radiation therapy would be recommended majority of the time.  The duration and treatment side effects were discussed with the patient.  This will coordinated with the radiation oncologist pending final pathology.    We also  discussed the role of systemic therapy in the treatment of early stage breast cancer.  We discussed that this is based on tumor biology and shannan status and will be determined based on final pathology.  We discussed that if the cancer is hormone positive, endocrine therapy would be recommended in most cases and its use can reduce the risk of recurrence as well as improve survival. Side effects of treatment were briefly discussed. We also discussed the potential role for chemotherapy based on a number of factors such as tumor phenotype (ER+ vs. triple negative vs. Qcp5ylf+) and this would be determined in coordination with the medical oncologist.    She is considering nipple sparing mastectomy, ,but there is suspicion on MRI of disease within 6mm of the NAC.  Will have this biopsied as well as a posterior region of NME aymmetrical to the contralateral breast  Receptors pending - Will follow up.  Resulted ER+IA+H2N-  Genetic testing to be performed - Dr. Morris - already established.  Tested negative  With the MRI findings, she has decided on mastectomy, but would like to obtain second opinions while setting up the additional biopsies.  I offered her to see Dr. Simons if she decides to have her surgery done at [place] and also assured her that Dr. Ashford would take good care of her if she opted for surgery at [facility].    I explained that I can only operate with the surgeons that operate at [facility], [facility], or .  We will re-evaluate [facility] vs [facility] after MRI biopsy.  If anything additional arises, she understands that chemo may be more likely recommended for her.    Patient was educated on breast cancer, receptors, wire localization lumpectomy, mastectomy, sentinel lymph node mapping and biopsy, axillary lymph node dissection, reconstruction, breast prosthesis with post-mastectomy bra and radiation therapy. Patient was given patient information binder including [facility] breast cancer treatment brochure.  All her  questions were answered.    Total time spent with the patient: 60 minutes.  45 minutes of face to face consultation and 15 minutes of chart review and coordination of care.

## 2018-02-26 ENCOUNTER — LAB VISIT (OUTPATIENT)
Dept: LAB | Facility: OTHER | Age: 48
End: 2018-02-26
Attending: OBSTETRICS & GYNECOLOGY
Payer: COMMERCIAL

## 2018-02-26 DIAGNOSIS — N95.1 MENOPAUSAL SYMPTOM: ICD-10-CM

## 2018-02-26 DIAGNOSIS — N92.6 IRREGULAR MENSES: ICD-10-CM

## 2018-02-26 LAB
25(OH)D3+25(OH)D2 SERPL-MCNC: 36 NG/ML
ESTRADIOL SERPL-MCNC: 105 PG/ML
FSH SERPL-ACNC: 22.1 MIU/ML
PROGEST SERPL-MCNC: 2.2 NG/ML
T3FREE SERPL-MCNC: 2.9 PG/ML
T4 FREE SERPL-MCNC: 0.97 NG/DL
TSH SERPL DL<=0.005 MIU/L-ACNC: 2.53 UIU/ML

## 2018-02-26 PROCEDURE — 84144 ASSAY OF PROGESTERONE: CPT

## 2018-02-26 PROCEDURE — 84439 ASSAY OF FREE THYROXINE: CPT

## 2018-02-26 PROCEDURE — 84443 ASSAY THYROID STIM HORMONE: CPT

## 2018-02-26 PROCEDURE — 84481 FREE ASSAY (FT-3): CPT

## 2018-02-26 PROCEDURE — 83001 ASSAY OF GONADOTROPIN (FSH): CPT

## 2018-02-26 PROCEDURE — 36415 COLL VENOUS BLD VENIPUNCTURE: CPT

## 2018-02-26 PROCEDURE — 82670 ASSAY OF TOTAL ESTRADIOL: CPT

## 2018-02-26 PROCEDURE — 82306 VITAMIN D 25 HYDROXY: CPT

## 2018-02-27 ENCOUNTER — TELEPHONE (OUTPATIENT)
Dept: HEMATOLOGY/ONCOLOGY | Facility: CLINIC | Age: 48
End: 2018-02-27

## 2018-02-27 NOTE — TELEPHONE ENCOUNTER
Per discussion with fide, ok to put pt March 6 at [facility] 12:45p for 45-min consult.    ----- Message from Fide German RN sent at 2/27/2018  1:30 PM CST -----    Scratch that day he said next Tuesday at [facility] just squeeze her in!    ----- Message -----  From: Avani Asher RN  Sent: 2/27/2018  10:25 AM  To: Bridger Blanchard MD, Fide German, RN  Subject: Dr Montiel pt                                     Dr. Tian,    Newly diagnosed IDC pt just called [facility] and said you had agreed to see her.  She is seeking your opinion prior to surgery.  Your first available consult is 3/20 but she needs appt sooner.  Did you have a special date/time in mind?    - Avani

## 2018-03-02 ENCOUNTER — HOSPITAL ENCOUNTER (OUTPATIENT)
Dept: RADIOLOGY | Facility: HOSPITAL | Age: 48
Discharge: HOME | End: 2018-03-02
Attending: SURGERY
Payer: COMMERCIAL

## 2018-03-02 ENCOUNTER — PATIENT MESSAGE (OUTPATIENT)
Dept: OBSTETRICS AND GYNECOLOGY | Facility: CLINIC | Age: 48
End: 2018-03-02

## 2018-03-02 ENCOUNTER — TELEPHONE (OUTPATIENT)
Dept: HEMATOLOGY/ONCOLOGY | Facility: CLINIC | Age: 48
End: 2018-03-02

## 2018-03-02 DIAGNOSIS — R93.89 ABNORMAL MRI: ICD-10-CM

## 2018-03-02 DIAGNOSIS — R93.89 ABNORMAL MRI: Primary | ICD-10-CM

## 2018-03-02 DIAGNOSIS — C50.919 BREAST CANCER: ICD-10-CM

## 2018-03-02 PROCEDURE — A9577 INJ MULTIHANCE: HCPCS | Performed by: SURGERY

## 2018-03-02 PROCEDURE — 77065 DX MAMMO INCL CAD UNI: CPT | Mod: 26,RT,, | Performed by: STUDENT IN AN ORGANIZED HEALTH CARE EDUCATION/TRAINING PROGRAM

## 2018-03-02 PROCEDURE — 77065 DX MAMMO INCL CAD UNI: CPT | Mod: TC,RT

## 2018-03-02 PROCEDURE — 88305 TISSUE EXAM BY PATHOLOGIST: CPT | Performed by: PATHOLOGY

## 2018-03-02 PROCEDURE — 25500020 PHARM REV CODE 255: Performed by: SURGERY

## 2018-03-02 PROCEDURE — 19085 BX BREAST 1ST LESION MR IMAG: CPT | Mod: RT,,, | Performed by: STUDENT IN AN ORGANIZED HEALTH CARE EDUCATION/TRAINING PROGRAM

## 2018-03-02 PROCEDURE — 19085 BX BREAST 1ST LESION MR IMAG: CPT | Mod: TC

## 2018-03-02 RX ADMIN — GADOBENATE DIMEGLUMINE 11 ML: 529 INJECTION, SOLUTION INTRAVENOUS at 08:03

## 2018-03-02 NOTE — TELEPHONE ENCOUNTER
----- Message from Destiny Donato sent at 3/2/2018 12:35 PM CST -----  Contact: PT Portal Request  Appointment Request From: Roberta Wheat    With Provider: Other - (see comments)    Would Accept With:Only the person I've selected    Preferred Date Range: Any date 2/26/2018 or later    Preferred Times: Any    Reason for visit: Request an Appt    Comments:  Dr. Israel  oncology

## 2018-03-05 ENCOUNTER — TELEPHONE (OUTPATIENT)
Dept: SURGERY | Facility: CLINIC | Age: 48
End: 2018-03-05

## 2018-03-05 NOTE — TELEPHONE ENCOUNTER
Called patient with the results of her breast biopsy. Explained that area by the nipple came back with LCIS, we discussed what this means. Patient understands that she is likely unable to have nipple sparing mastectomies, and is comfortable with that. She will meet with the oncologist tomorrow and follow up with Dr. Montiel after that. Verbalized understanding to all information

## 2018-03-06 ENCOUNTER — OFFICE VISIT (OUTPATIENT)
Dept: SURGERY | Facility: CLINIC | Age: 48
End: 2018-03-06
Payer: COMMERCIAL

## 2018-03-06 VITALS
TEMPERATURE: 98 F | SYSTOLIC BLOOD PRESSURE: 122 MMHG | HEART RATE: 80 BPM | RESPIRATION RATE: 18 BRPM | DIASTOLIC BLOOD PRESSURE: 63 MMHG | BODY MASS INDEX: 20.2 KG/M2 | HEIGHT: 63 IN | WEIGHT: 114 LBS

## 2018-03-06 DIAGNOSIS — C50.611 CARCINOMA OF AXILLARY TAIL OF RIGHT BREAST IN FEMALE, ESTROGEN RECEPTOR POSITIVE: ICD-10-CM

## 2018-03-06 DIAGNOSIS — Z17.0 CARCINOMA OF AXILLARY TAIL OF RIGHT BREAST IN FEMALE, ESTROGEN RECEPTOR POSITIVE: ICD-10-CM

## 2018-03-06 PROCEDURE — 3074F SYST BP LT 130 MM HG: CPT | Mod: S$GLB,,, | Performed by: INTERNAL MEDICINE

## 2018-03-06 PROCEDURE — 99205 OFFICE O/P NEW HI 60 MIN: CPT | Mod: S$GLB,,, | Performed by: INTERNAL MEDICINE

## 2018-03-06 PROCEDURE — 99999 PR PBB SHADOW E&M-EST. PATIENT-LVL III: CPT | Mod: PBBFAC,,, | Performed by: INTERNAL MEDICINE

## 2018-03-06 PROCEDURE — 3078F DIAST BP <80 MM HG: CPT | Mod: S$GLB,,, | Performed by: INTERNAL MEDICINE

## 2018-03-06 NOTE — Clinical Note
Monika,and Fide, please cc my note to Dr. Corona when I proof read it.  He is at the [facility]

## 2018-03-06 NOTE — PROGRESS NOTES
Subjective:       Patient ID: Roberta Wheat is a 48 y.o. female.    Chief Complaint: No chief complaint on file.    HPI     REFERRING PHYSICIAN:  Luz Marina Camacho M.D.    REASON FOR REFERRAL:  Recent diagnosis of breast cancer, consideration for   neoadjuvant treatment.    HISTORY OF PRESENT ILLNESS:  Mrs. Wheat is a pleasant 48-year-old    female who recently had an abnormal mammogram.  Specifically, a mammogram that   was done on 2018 showed a 9 x 7 x 8 mm, irregularly shaped hypoechoic mass   in the right breast at the 12 o'clock position, 3 cm from the nipple.  An   ultrasound biopsy was recommended and was performed on 2018.  The biopsy   showed evidence of infiltrating ductal carcinoma that was ER strongly positive,   LA positive, and HER-2 negative.  Ki-67 was 30%.  The patient also had a biopsy   of a different lesion at the 12 o'clock position in the right breast that was   close to the nipple.  That biopsy showed evidence of LCIS with no evidence of   invasive carcinoma.  Mrs. Wheat has seen Dr. Montiel and Dr. Corona and she is   referred to discuss possible neoadjuvant approaches.  Of note, she has already   decided that she wants to proceed with bilateral mastectomies with insertion of   prostheses.    PAST MEDICAL HISTORY:  As above.  She has a history of migraine headaches and   she underwent fertility treatment for approximately a year.  She had triplets 15   years ago through a .    SOCIAL HISTORY:  She is  and lives with her .  She works as an   .  She drinks socially and does not smoke.    FAMILY HISTORY:  A maternal grandmother had been diagnosed with breast cancer.    GYN HISTORY:  Menarche was at 11.  She is  and she had triplets at age 32.    She is still perimenopausal.    Review of Systems      Overall she feels fair.  She has a large ecchymosis from her recent biopsy and her right breast is sore.  She is anxious with the recent  developments but she denies any depression, easy bruising, fevers, chills, night  sweats, weight loss, nausea, vomiting, diarrhea, constipation, diplopia, blurred vision, headache, chest pain, palpitations, shortness of breath, breast pain, abdominal pain, extremity pain, or difficulty ambulating.  The remainder of the ten-point ROS, including general, skin, lymph, H/N, cardiorespiratory, GI, , Neuro, Endocrine, and psychiatric is negative.     Objective:      Physical Exam      She is alert, oriented to time, place, person, pleasant, well      nourished, in no acute physical distress.  She is here with her .                                VITAL SIGNS:  Reviewed                                      HEENT:  Normal.  There are no nasal, oral, lip, gingival, auricular, lid,    or conjunctival lesions.  Mucosae are moist and pink, and there is no        thrush.  Pupils are equal, reactive to light and accommodation.              Extraocular muscle movements are intact.  Dentition is good.  There is no frontal or maxillary tenderness.                                     NECK:  Supple without JVD, adenopathy, or thyromegaly.                       LUNGS:  Clear to auscultation without wheezing, rales, or rhonchi.           CARDIOVASCULAR:  Reveals an S1, S2, no murmurs, no rubs, no gallops.         ABDOMEN:  Soft, nontender, without organomegaly.  Bowel sounds are    present.                                                                     EXTREMITIES:  No cyanosis, clubbing, or edema.                               BREASTS:  There is a large ecchymosis and a hematoma from her recent biopsy on the right breast.  There is no palpable right axillary adenopathy. She does have fibrocystic disease in the left breast.   There are no masses in either breast.                                    LYMPHATIC:  There is no cervical, axillary, or supraclavicular adenopathy.   SKIN:  Warm and moist, without petechiae, rashes,  induration, or ecchymoses other than the large ecchymosis mentioned above on her right breast, extending laterally and inferiorly from her nipple.           NEUROLOGIC:  DTRs are 0-1+ bilaterally, symmetrical, motor function is 5/5,  and cranial nerves are  within normal limits.    Assessment:       1. Carcinoma of axillary tail of right breast in female, estrogen receptor positive      2.    LCIS  Plan:        I had a long discussion with her and her ; she is not interested in lumpectomy and has decided that she will proceed with bilateral mastectomies with insertion of prostheses.  I explained to her that if she has surgery upfront she may not need chemotherapy, if she is node negative and her Oncotype score is low, or she may only need four cycles if she is node negative.  On the other hand, if she were to receive preoperative chemotherapy, this would be a commitment to 8 cycles.  I recommended that she proceed with surgery upfront and I will evaluate her after her mastectomies.  The pros and cons of such an approach were explained to her and her .  Her multiple questions were answered to her satisfaction.  Since she has seen Dr. Corona and Dr. Montiel, I will cc the note to both; I have explained to her that I would like to see her 2-3 weeks after her surgery, and she may proceed with surgery in the next few weeks.  I spent approximately 60 minutes reviewing the available records and evaluating the patient, out of which over 50% of the time was spent face to face with the patient in counseling and coordinating this patient's care.  Psychosocial Distress Score: 3 . No intervention indicated.

## 2018-03-09 ENCOUNTER — TELEPHONE (OUTPATIENT)
Dept: SURGERY | Facility: CLINIC | Age: 48
End: 2018-03-09

## 2018-03-09 NOTE — TELEPHONE ENCOUNTER
----- Message from Luz Marina Montiel MD sent at 3/7/2018  5:54 PM CST -----  Would you mind reaching out to her to see if she has any desire to schedule surgery?  I am sure she will go to [place] but just want to make sure we get her on the schedule quickly if she is deciding to have surgery with us      Thanks

## 2018-03-09 NOTE — TELEPHONE ENCOUNTER
Called patient to make sure she is getting what she needs as far as her surgical planning. Patient states she is scheduled for surgery at [facility] on March 21st. She will let me know when she has her final path and her drains are out so we can facilitate a follow up with Dr. Tian. Encouraged patient to let us know if I can do anything in the meantime.

## 2018-03-12 ENCOUNTER — HOSPITAL ENCOUNTER (OUTPATIENT)
Dept: CARDIOLOGY | Facility: OTHER | Age: 48
Discharge: HOME | End: 2018-03-12
Attending: PHYSICIAN ASSISTANT
Payer: COMMERCIAL

## 2018-03-12 ENCOUNTER — HOSPITAL ENCOUNTER (OUTPATIENT)
Dept: RADIOLOGY | Facility: OTHER | Age: 48
Discharge: HOME | End: 2018-03-12
Attending: PHYSICIAN ASSISTANT
Payer: COMMERCIAL

## 2018-03-12 DIAGNOSIS — Z01.812 PRE-OPERATIVE LABORATORY EXAMINATION: Primary | ICD-10-CM

## 2018-03-12 DIAGNOSIS — Z01.810 PRE-OPERATIVE CARDIOVASCULAR EXAMINATION: ICD-10-CM

## 2018-03-12 DIAGNOSIS — Z01.811 PRE-OPERATIVE RESPIRATORY EXAMINATION: ICD-10-CM

## 2018-03-12 DIAGNOSIS — Z01.811 PRE-OPERATIVE RESPIRATORY EXAMINATION: Primary | ICD-10-CM

## 2018-03-12 PROCEDURE — 93005 ELECTROCARDIOGRAM TRACING: CPT

## 2018-03-12 PROCEDURE — 93010 ELECTROCARDIOGRAM REPORT: CPT | Mod: ,,, | Performed by: INTERNAL MEDICINE

## 2018-03-12 PROCEDURE — 71046 X-RAY EXAM CHEST 2 VIEWS: CPT | Mod: TC,FY

## 2018-03-12 PROCEDURE — 71046 X-RAY EXAM CHEST 2 VIEWS: CPT | Mod: 26,,, | Performed by: RADIOLOGY

## 2018-03-24 ENCOUNTER — PATIENT MESSAGE (OUTPATIENT)
Dept: SURGERY | Facility: CLINIC | Age: 48
End: 2018-03-24

## 2018-04-02 ENCOUNTER — TELEPHONE (OUTPATIENT)
Dept: SURGERY | Facility: CLINIC | Age: 48
End: 2018-04-02

## 2018-04-02 ENCOUNTER — TELEPHONE (OUTPATIENT)
Dept: OBSTETRICS AND GYNECOLOGY | Facility: CLINIC | Age: 48
End: 2018-04-02

## 2018-04-02 RX ORDER — PROMETHAZINE HYDROCHLORIDE 25 MG/1
25 TABLET ORAL EVERY 6 HOURS PRN
Qty: 30 TABLET | Refills: 1 | Status: SHIPPED | OUTPATIENT
Start: 2018-04-02 | End: 2018-04-10 | Stop reason: SDUPTHER

## 2018-04-02 NOTE — TELEPHONE ENCOUNTER
Patient reports worsening migraines recently. She was taken off of her Imitrex  But had t be readmitted for migraine last week.  She reports doing ok for 1 week until she woke up in the middle of the night with a migraine. She reports the last 2 nights with migraines. She had surgery and they took her off of Imitirex for prn migraines. She has Zofran for nausea. Will contact nurse navigator at breast center to see if she can help get a Neuro appt. Will send in Phenergan prescription- continue Imitrex and keep follow up with breast surgeon

## 2018-04-02 NOTE — TELEPHONE ENCOUNTER
Called patient after receiving request from Dr. Morris to help get her in with Neurology for her chronic migraines. Explained that I placed an urgent referral, tried to call the department but couldn't reach anyone this late in the day. Told her I would keep trying to reach them, and to be on the lookout for a call from them now that the referral has been placed. Patient verbalized understanding.

## 2018-04-02 NOTE — TELEPHONE ENCOUNTER
----- Message from Magdalene Arguelles sent at 4/2/2018  9:53 AM CDT -----        Name of Who is Calling: KAL GOETZ [[id number]]      What is the request in detail: Pt is calling to speak with the nurse regarding migraines. Please call to discuss.      Can the clinic reply by [facility]:no      What [facility] to Call Back if not [facility]:[phone]

## 2018-04-03 ENCOUNTER — TELEPHONE (OUTPATIENT)
Dept: SURGERY | Facility: CLINIC | Age: 48
End: 2018-04-03

## 2018-04-03 NOTE — TELEPHONE ENCOUNTER
Called patient to review neurology appt for next Tuesday 4/10 at 8am. Reviewed location with patient, verbalized understanding to all information

## 2018-04-09 ENCOUNTER — TELEPHONE (OUTPATIENT)
Dept: HEMATOLOGY/ONCOLOGY | Facility: CLINIC | Age: 48
End: 2018-04-09

## 2018-04-09 ENCOUNTER — PATIENT MESSAGE (OUTPATIENT)
Dept: HEMATOLOGY/ONCOLOGY | Facility: CLINIC | Age: 48
End: 2018-04-09

## 2018-04-10 ENCOUNTER — OFFICE VISIT (OUTPATIENT)
Dept: NEUROLOGY | Facility: CLINIC | Age: 48
End: 2018-04-10
Payer: COMMERCIAL

## 2018-04-10 VITALS
HEIGHT: 63 IN | BODY MASS INDEX: 21.36 KG/M2 | WEIGHT: 120.56 LBS | SYSTOLIC BLOOD PRESSURE: 90 MMHG | DIASTOLIC BLOOD PRESSURE: 62 MMHG | HEART RATE: 77 BPM

## 2018-04-10 DIAGNOSIS — G44.89 CHRONIC MIXED HEADACHE SYNDROME: ICD-10-CM

## 2018-04-10 DIAGNOSIS — C50.811 MALIGNANT NEOPLASM OF OVERLAPPING SITES OF RIGHT BREAST IN FEMALE, ESTROGEN RECEPTOR POSITIVE: ICD-10-CM

## 2018-04-10 DIAGNOSIS — G43.009 MIGRAINE WITHOUT AURA AND WITHOUT STATUS MIGRAINOSUS, NOT INTRACTABLE: ICD-10-CM

## 2018-04-10 DIAGNOSIS — R51.9 WORSENING HEADACHES: Primary | ICD-10-CM

## 2018-04-10 DIAGNOSIS — Z17.0 MALIGNANT NEOPLASM OF OVERLAPPING SITES OF RIGHT BREAST IN FEMALE, ESTROGEN RECEPTOR POSITIVE: ICD-10-CM

## 2018-04-10 PROCEDURE — 3078F DIAST BP <80 MM HG: CPT | Mod: CPTII,S$GLB,, | Performed by: NURSE PRACTITIONER

## 2018-04-10 PROCEDURE — 99999 PR PBB SHADOW E&M-EST. PATIENT-LVL III: CPT | Mod: PBBFAC,,, | Performed by: NURSE PRACTITIONER

## 2018-04-10 PROCEDURE — 3074F SYST BP LT 130 MM HG: CPT | Mod: CPTII,S$GLB,, | Performed by: NURSE PRACTITIONER

## 2018-04-10 PROCEDURE — 99204 OFFICE O/P NEW MOD 45 MIN: CPT | Mod: S$GLB,,, | Performed by: NURSE PRACTITIONER

## 2018-04-10 RX ORDER — NORTRIPTYLINE HYDROCHLORIDE 10 MG/1
CAPSULE ORAL
Qty: 60 CAPSULE | Refills: 2 | Status: SHIPPED | OUTPATIENT
Start: 2018-04-10 | End: 2019-04-04

## 2018-04-10 RX ORDER — CYCLOBENZAPRINE HCL 10 MG
TABLET ORAL
COMMUNITY
Start: 2018-03-23 | End: 2019-04-04

## 2018-04-10 RX ORDER — ONDANSETRON 8 MG/1
TABLET, ORALLY DISINTEGRATING ORAL
COMMUNITY
Start: 2018-03-23 | End: 2019-04-04

## 2018-04-10 RX ORDER — PROMETHAZINE HYDROCHLORIDE 25 MG/1
25 TABLET ORAL EVERY 6 HOURS PRN
Qty: 30 TABLET | Refills: 5 | Status: SHIPPED | OUTPATIENT
Start: 2018-04-10 | End: 2019-04-04

## 2018-04-10 RX ORDER — BUTALBITAL, ACETAMINOPHEN AND CAFFEINE 300; 40; 50 MG/1; MG/1; MG/1
CAPSULE ORAL
COMMUNITY
Start: 2018-03-07 | End: 2019-04-04

## 2018-04-10 RX ORDER — ZOLMITRIPTAN 5 MG/1
SPRAY NASAL
Qty: 12 EACH | Refills: 5 | Status: SHIPPED | OUTPATIENT
Start: 2018-04-10 | End: 2018-11-19

## 2018-04-10 NOTE — PROGRESS NOTES
SUBJECTIVE:  Patient ID: Roberta Wheat   MRN: [id number]  Referred By: Dr. Mily Morris  Chief Complaint: Headache and Consult    History of Present Illness:   48 y.o. female with breast cancer and migraines, who presents to clinic alone for evaluation of headaches.   Migraines initially began in her teenage years.  However, in January she saw a new GYN who stopped her hormone patch which she was using for the week of her menstrual cycle to prevent menstrually related migraines, also stopped OCP.  After she stopped both therapies, migraines began to occur multiple days per week.  She woke up with a migraine on three occasions this week.  Was taking PO dilaudid regularly, was advised to discontinue using all pain medication, was using pain medications for post-op pain.  Was seeing her breast surgeon last week, at that time she was having a Migraine, breast surgeon gave her Botox injections last Thursday, had a headache everyday last week, until Saturday, states she has not had a headache since Saturday.  Headaches are solely confined to the left side, behind her eye and into the temporal region, pain is described as severe, stabbing/pounding pain which lasts hours up to 2 days.  Associated symptoms include, nausea, vomiting, photo/phonophobia.  She denies presence of aura symptoms prior to the onset of her migraine.  Pain can be rated anywhere from 3 to 8 out of 10, intensifying to peak within an hour.  Also if she is going to vomit, she will vomit within about an hour.  Has been taking Sumatriptan for a very long time, at least since 1999, states this has always been effective for her migraines in the past.  Prior to d/c oral contraceptive patch and OCP, she would get migraines on average 5 days per month, she does admit they had been increasing in frequency over the last year.  Historically, she would only have migraines two days per week, the Thursday and Friday of her menstrual cycle.  Was given  phenergan by GYN, which has been helping her.  Migraines are the same as they have always been, she denies any change in the nature or quality of her headaches, just more frequent and severe.    Triggers - menstrual cycle, possibly red wine   Aggravating Factors - light, sound, movement   Alleviating Factors - sleep, sumatriptan   Head Trauma? Infection? Fever? Cancer? <-- recently diagnosed with breast cancer   Sleep - She has been falling asleep okay, but has been taking phenergan nightly, wakes up multiple times throughout the night, she is also waking up having hot flashes   Family Hx of Migraines <-- daughter   Positives in bold: Hx of Kidney Stones, asthma, GI bleed, osteoporosis, CAD/MI, CVA/TIA, DM <-- denies      Treatments Tried and Response  Botox - she does feel this has been helping   Sumatriptan - worked within less than 30 minutes   Fioricet - helps   Phenergan - helps nausea  Flexeril - she does think this is helping  Pamelor - given today   Zomig NS - given today     Social History  Alcohol - yes,   Smoke - no   Recreational Drug Use- no     Current Medications:    clindamycin-benzoyl peroxide gel, , Disp: , Rfl:     promethazine (PHENERGAN) 25 MG tablet, Take 1 tablet (25 mg total) by mouth every 6 (six) hours as needed for Nausea., Disp: 30 tablet, Rfl: 5    spironolactone (ALDACTONE) 100 MG tablet, Take 100 mg by mouth once daily. , Disp: , Rfl:     sumatriptan (IMITREX) 100 MG tablet, Take 100 mg by mouth once. , Disp: , Rfl:     AFLURIA [id number], PF, 45 mcg (15 mcg x 3)/0.5 mL Syrg, inject 0.5 milliliter intramuscularly, Disp: , Rfl: 0    butalbital-acetaminophen-caff -40 mg Cap, , Disp: , Rfl:     cyclobenzaprine (FLEXERIL) 10 MG tablet, , Disp: , Rfl:     nortriptyline (PAMELOR) 10 MG capsule, 1 capsule nightly x 2 weeks, then 2 capsules nightly., Disp: 60 capsule, Rfl: 2    ondansetron (ZOFRAN-ODT) 8 MG TbDL, , Disp: , Rfl:     ZOLMitriptan (ZOMIG) 5 mg Spry, 1 spray at  "onset of migraine, may repeat dose in 2 hours if needed. Max 2 tabs/day. Max 3 days/week., Disp: 12 each, Rfl: 5    Review of Systems - as per HPI, otherwise a balanced 10 systems review is negative.    OBJECTIVE:  Vitals:  BP 90/62   Pulse 77   Ht 5' 3" (1.6 m)   Wt 54.7 kg (120 lb 9.5 oz)   BMI 21.36 kg/m²     Physical Exam   Constitutional: She appears well-developed and well-nourished. She is well groomed. NAD  HENT:    Head: Normocephalic and atraumatic, oral and nasal mucosa intact.  Frontalis was NTTP, temporalis was NTTP   Eyes: Conjunctivae and EOM are normal. Pupils are equal, round, and reactive to light   Neck: Neck supple. Occiput and trapezius TTP bilaterally, reports this is since her surgery     Musculoskeletal: Normal range of motion. No joint stiffness. No vertebral point tenderness.  Skin: Skin is warm and dry.  Psychiatric: Normal mood and affect.     Neuro exam:    Mental status:  The patient is awake, alert, and oriented to person, place and time.  Language is intact and fluent  Remote and recent memory are intact  Normal attention and concentration  Mood is stable    Cranial Nerves:  Fundoscopic examination does not reveal any occult papilledema.    Pupils are equal and reactive to light.    Extraocular movements are intact and without nystagmus.    Visual fields are full to confrontation testing.   Facial movement is symmetric.  Facial sensation is intact.    Hearing is intact to finger rub   Uvula in midline. Tongue in midline without fasciculation.   DROM of neck in all (6) directions, due to tightness from breast surgery 2 weeks ago   Shoulder shrug symmetrical.    Coordination:     Finger to nose - normal and symmetric bilaterally   Heel to shin test - normal and symmetric bilaterally     Motor:  Normal muscle bulk and symmetry. No fasciculations were noted.   Tremor not apparent   Pronator drift not apparent.    strength was strong and symmetric   Finger extension strength was " strong and symmetric   RUE:appropriate against gravity and medium force as tested 4+/5  LUE: appropriate against gravity and medium force as tested 4+/5  RLE:appropriate against gravity and medium force as tested 5/5              LLE: appropriate against gravity and medium force as tested 5/5    Reflexes:  Right Brachioradialis 2+  Left Brachioradialis 2+  Right Biceps 2+  Left Biceps 2+  Right Patellar2+  Left Patellar 2+                          Bynum was negative bilaterally      Sensory:  RUE  intact light touch  LUE intact light touch    RLE intact light touch  LLE intact light touch    Gait:   Romberg - negative  Normal gait  Tandem, Heel, and Toe Walk - able to perform without difficulty     Review of Data:   Notes from OBGYN, breast surgeon, Long/Onc reviewed   Labs:  Lab Visit on 03/12/2018   Component Date Value Ref Range Status    WBC 03/12/2018 4.65  3.90 - 12.70 K/uL Final    RBC 03/12/2018 4.21  4.00 - 5.40 M/uL Final    Hemoglobin 03/12/2018 13.0  12.0 - 16.0 g/dL Final    Hematocrit 03/12/2018 39.4  37.0 - 48.5 % Final    MCV 03/12/2018 94  82 - 98 fL Final    MCH 03/12/2018 30.9  27.0 - 31.0 pg Final    MCHC 03/12/2018 33.0  32.0 - 36.0 g/dL Final    RDW 03/12/2018 13.3  11.5 - 14.5 % Final    Platelets 03/12/2018 259  150 - 350 K/uL Final    MPV 03/12/2018 11.1  9.2 - 12.9 fL Final    Gran # (ANC) 03/12/2018 2.4  1.8 - 7.7 K/uL Final    Lymph # 03/12/2018 1.7  1.0 - 4.8 K/uL Final    Mono # 03/12/2018 0.4  0.3 - 1.0 K/uL Final    Eos # 03/12/2018 0.1  0.0 - 0.5 K/uL Final    Baso # 03/12/2018 0.03  0.00 - 0.20 K/uL Final    Gran% 03/12/2018 52.5  38.0 - 73.0 % Final    Lymph% 03/12/2018 35.9  18.0 - 48.0 % Final    Mono% 03/12/2018 9.5  4.0 - 15.0 % Final    Eosinophil% 03/12/2018 1.3  0.0 - 8.0 % Final    Basophil% 03/12/2018 0.6  0.0 - 1.9 % Final    Differential Method 03/12/2018 Automated   Final    Sodium 03/12/2018 138  136 - 145 mmol/L Final    Potassium 03/12/2018  4.1  3.5 - 5.1 mmol/L Final    Chloride 03/12/2018 104  95 - 110 mmol/L Final    CO2 03/12/2018 28  23 - 29 mmol/L Final    Glucose 03/12/2018 88  70 - 110 mg/dL Final    BUN, Bld 03/12/2018 17  6 - 20 mg/dL Final    Creatinine 03/12/2018 0.7  0.5 - 1.4 mg/dL Final    Calcium 03/12/2018 9.4  8.7 - 10.5 mg/dL Final    Anion Gap 03/12/2018 6* 8 - 16 mmol/L Final    eGFR if African American 03/12/2018 >60  >60 mL/min/1.73 m^2 Final    eGFR if non African American 03/12/2018 >60  >60 mL/min/1.73 m^2 Final    Specimen UA 03/12/2018 Urine, Clean Catch   Final    Color, UA 03/12/2018 Yellow  Yellow, Straw, Roz Final    Appearance, UA 03/12/2018 Clear  Clear Final    pH, UA 03/12/2018 6.0  5.0 - 8.0 Final    Specific Gravity, UA 03/12/2018 <=1.005* 1.005 - 1.030 Final    Protein, UA 03/12/2018 Negative  Negative Final    Glucose, UA 03/12/2018 Negative  Negative Final    Ketones, UA 03/12/2018 Negative  Negative Final    Bilirubin (UA) 03/12/2018 Negative  Negative Final    Occult Blood UA 03/12/2018 Negative  Negative Final    Nitrite, UA 03/12/2018 Negative  Negative Final    Urobilinogen, UA 03/12/2018 Negative  <2.0 EU/dL Final    Leukocytes, UA 03/12/2018 Negative  Negative Final   Lab Visit on 02/26/2018   Component Date Value Ref Range Status    FSH 02/26/2018 22.10  See Text mIU/mL Final    Estradiol 02/26/2018 105  See Text pg/mL Final    Progesterone 02/26/2018 2.2  See Text ng/mL Final    TSH 02/26/2018 2.531  0.400 - 4.000 uIU/mL Final    T3, Free 02/26/2018 2.9  2.3 - 4.2 pg/mL Final    Free T4 02/26/2018 0.97  0.71 - 1.51 ng/dL Final    Vit D, 25-Hydroxy 02/26/2018 36  30 - 96 ng/mL Final   Lab Visit on 02/05/2018   Component Date Value Ref Range Status    BUN, Bld 02/05/2018 16  6 - 20 mg/dL Final    Creatinine 02/05/2018 0.8  0.5 - 1.4 mg/dL Final    eGFR if African American 02/05/2018 >60  >60 mL/min/1.73 m^2 Final    eGFR if non African American 02/05/2018 >60  >60  mL/min/1.73 m^2 Final   Lab Visit on 01/29/2018   Component Date Value Ref Range Status    Miscellaneous Test Name 01/29/2018 BRCA MyRisk   Final    Specimen Type 01/29/2018 Blood   Final    Test Result 01/29/2018 See result image under hyperlink   Final    Reference Lab 01/29/2018 SEE COMMENT   Final     Note: I have independently reviewed any/all imaging/labs/tests and agree with the report (s) as documented.  Any discrepancies will be as noted/demarcated by free text.  RAUL TRIPP 4/15/2018    ASSESSMENT:  1. Worsening headaches    2. Migraine without aura and without status migrainosus, not intractable    3. Malignant neoplasm of overlapping sites of right breast in female, estrogen receptor positive    4. Chronic mixed headache syndrome      PLAN:  - MRI Brain W/WO Contrast ordered   - For migraine prevention - start Pamelor 10 mg nightly, may titrate dose to 25 mg nightly if needed   - For migraine abortive - given Zomig NS   - She understands she cannot use sumatriptan and zomig ns on the same day   - For nausea - refilled Phenergan   - Stressed importance of avoiding overuse of medications, to avoid medication overuse headache   - Start tracking headaches via Migraine Kaden dudley on phone   - RTC in 6 weeks to 3 months      Orders Placed This Encounter    MRI Brain W WO Contrast    nortriptyline (PAMELOR) 10 MG capsule    ZOLMitriptan (ZOMIG) 5 mg Spry    promethazine (PHENERGAN) 25 MG tablet     I have discussed realistic goals of care with patient at length as well as medication options, and need for lifestyle adjustment. I have explained that treatment will take time. We have agreed that the goal will be to reduce frequency/intensity/quantity of HA, not to be completely HA free. I have explained my non narcotic policy regarding headache treatment.    Patient to track frequency of headaches.  Patient agreeable to work on lifestyle adjustments.    I spent 50 minutes face-to-face with the patient with  >50% of the time spent with counseling and education regarding:  - results of data, diagnosis, and recommendations stated above  - risks, benefits, and potential side effects of pamelor, zomig NS discussed   - alternative treatment options including amitritpyline offered   - importance of healthy diet, regular exercise and sleep hygiene in the treatment of headaches      Questions and concerns were sought and answered to the patient's stated verbal satisfaction.  The patient verbalizes understanding and agreement with the above stated treatment plan.     CC: MD Mily De, FNP-C  [facility]  [phone]    Dr. Granado was available during today's encounter.

## 2018-04-10 NOTE — LETTER
April 13, 2018      Mily Morris MD  [address] [address]           [facility]  [address]  [place] [address]  Phone: [phone]  Fax: [phone]          Patient: Roberta Wheat   MR Number: [id number]   YOB: 1970   Date of Visit: 4/10/2018       Dear Dr. Mily Morris:    Thank you for referring Roberta Wheat to me for evaluation. Attached you will find relevant portions of my assessment and plan of care.    If you have questions, please do not hesitate to call me. I look forward to following Roberta Wheat along with you.    Sincerely,        Enclosure  CC:  No Recipients    If you would like to receive this communication electronically, please contact [email or web address] or [phone] to request more information on [employer] access.    For providers and/or their staff who would like to refer a patient to [facility], please contact us through our one-stop-shop provider referral line, [facility] , at [phone].    If you feel you have received this communication in error or would no longer like to receive these types of communications, please e-mail [email or web address]

## 2018-04-10 NOTE — PATIENT INSTRUCTIONS
Download migraine nkechi dudley on phone and start tracking headaches    For headache prevention - start Nortriptyline (Pamelor)    Take 1 capsule nightly x 2 weeks   If no benefit, but no side effects- increase dose to 2 capsules nightly     For migraine abortive -    Zomig nasal spray -     Definitely use this option if you wake up with a headache    1 spray into nostril at onset of migraine    May repeat dose in 2 hours if needed    Max 2 sprays/day    Max 3 days/week   You cannot use Zomig nasal spray and Sumatriptan on the same day

## 2018-04-13 ENCOUNTER — HOSPITAL ENCOUNTER (OUTPATIENT)
Dept: RADIOLOGY | Facility: HOSPITAL | Age: 48
Discharge: HOME | End: 2018-04-13
Attending: NURSE PRACTITIONER
Payer: COMMERCIAL

## 2018-04-13 DIAGNOSIS — G44.89 CHRONIC MIXED HEADACHE SYNDROME: ICD-10-CM

## 2018-04-13 DIAGNOSIS — C50.811 MALIGNANT NEOPLASM OF OVERLAPPING SITES OF RIGHT BREAST IN FEMALE, ESTROGEN RECEPTOR POSITIVE: ICD-10-CM

## 2018-04-13 DIAGNOSIS — Z17.0 MALIGNANT NEOPLASM OF OVERLAPPING SITES OF RIGHT BREAST IN FEMALE, ESTROGEN RECEPTOR POSITIVE: ICD-10-CM

## 2018-04-13 DIAGNOSIS — R51.9 WORSENING HEADACHES: ICD-10-CM

## 2018-04-13 DIAGNOSIS — G43.009 MIGRAINE WITHOUT AURA AND WITHOUT STATUS MIGRAINOSUS, NOT INTRACTABLE: ICD-10-CM

## 2018-04-13 PROCEDURE — 25500020 PHARM REV CODE 255: Performed by: NURSE PRACTITIONER

## 2018-04-13 PROCEDURE — 70553 MRI BRAIN STEM W/O & W/DYE: CPT | Mod: 26,,, | Performed by: RADIOLOGY

## 2018-04-13 PROCEDURE — A9585 GADOBUTROL INJECTION: HCPCS | Performed by: NURSE PRACTITIONER

## 2018-04-13 PROCEDURE — 70553 MRI BRAIN STEM W/O & W/DYE: CPT | Mod: TC

## 2018-04-13 RX ORDER — GADOBUTROL 604.72 MG/ML
6 INJECTION INTRAVENOUS
Status: COMPLETED | OUTPATIENT
Start: 2018-04-13 | End: 2018-04-13

## 2018-04-13 RX ADMIN — GADOBUTROL 6 ML: 604.72 INJECTION INTRAVENOUS at 05:04

## 2018-04-16 ENCOUNTER — OFFICE VISIT (OUTPATIENT)
Dept: HEMATOLOGY/ONCOLOGY | Facility: CLINIC | Age: 48
End: 2018-04-16
Payer: COMMERCIAL

## 2018-04-16 ENCOUNTER — PATIENT MESSAGE (OUTPATIENT)
Dept: NEUROLOGY | Facility: CLINIC | Age: 48
End: 2018-04-16

## 2018-04-16 VITALS
BODY MASS INDEX: 19.81 KG/M2 | WEIGHT: 116 LBS | DIASTOLIC BLOOD PRESSURE: 55 MMHG | OXYGEN SATURATION: 99 % | SYSTOLIC BLOOD PRESSURE: 101 MMHG | TEMPERATURE: 98 F | RESPIRATION RATE: 18 BRPM | HEART RATE: 81 BPM | HEIGHT: 64 IN

## 2018-04-16 DIAGNOSIS — C50.611 CARCINOMA OF AXILLARY TAIL OF RIGHT BREAST IN FEMALE, ESTROGEN RECEPTOR POSITIVE: Primary | ICD-10-CM

## 2018-04-16 DIAGNOSIS — Z17.0 CARCINOMA OF AXILLARY TAIL OF RIGHT BREAST IN FEMALE, ESTROGEN RECEPTOR POSITIVE: Primary | ICD-10-CM

## 2018-04-16 PROCEDURE — 99214 OFFICE O/P EST MOD 30 MIN: CPT | Mod: S$GLB,,, | Performed by: INTERNAL MEDICINE

## 2018-04-16 RX ORDER — TAMOXIFEN CITRATE 20 MG/1
20 TABLET ORAL DAILY
Qty: 90 TABLET | Refills: 2 | Status: SHIPPED | OUTPATIENT
Start: 2018-04-16 | End: 2019-02-02 | Stop reason: SDUPTHER

## 2018-04-16 NOTE — PROGRESS NOTES
Subjective:       Patient ID: Roberta Wheat is a 48 y.o. female.    Chief Complaint: No chief complaint on file.    HPI     Mrs. Wheat returns today for follow up after undergoing bilateral mastectomies at [facility] on 3/21/2018.  The pathology report indicates that she had a 1.2 cm carcinoma with mixed ductal and lobular features, while two sentinel nodes were negative.  There was LCIS in the nipple duct, but the nipples were removed.  Resection margins were clear.   Her Oncotype score was 17.  Briefly, she is a 48-year-old  female who underwent bilateral mastectomies for a 12 mm mixed lobular and infiltrating ductal carcinoma that was ER strongly positive, VT positive, and HER-2 negative.  Ki-67 was 30%.  T    Review of Systems      Overall she feels OK.  She is still sore from her surgery, but she she denies any depression, easy bruising, fevers, chills, night  sweats, weight loss, nausea, vomiting, diarrhea, constipation, diplopia, blurred vision, headache, chest pain, palpitations, shortness of breath, breast pain, abdominal pain, extremity pain, or difficulty ambulating.  The remainder of the ten-point ROS, including general, skin, lymph, H/N, cardiorespiratory, GI, , Neuro, Endocrine, and psychiatric is negative.     Objective:      Physical Exam      She is alert, oriented to time, place, person, pleasant, well      nourished, in no acute physical distress.  She is here with her .                                VITAL SIGNS:  Reviewed                                      HEENT:  Normal.  There are no nasal, oral, lip, gingival, auricular, lid,    or conjunctival lesions.  Mucosae are moist and pink, and there is no        thrush.  Pupils are equal, reactive to light and accommodation.              Extraocular muscle movements are intact.  Dentition is good.  There is no frontal or maxillary tenderness.                                     NECK:  Supple without JVD,  adenopathy, or thyromegaly.                       LUNGS:  Clear to auscultation without wheezing, rales, or rhonchi.           CARDIOVASCULAR:  Reveals an S1, S2, no murmurs, no rubs, no gallops.         ABDOMEN:  Soft, nontender, without organomegaly.  Bowel sounds are    present.                                                                     EXTREMITIES:  No cyanosis, clubbing, or edema.                               BREASTS:  she is s/p bilateral mastectomies with prostheses in place.  Her incisions are healing nicely.                              LYMPHATIC:  There is no cervical, axillary, or supraclavicular adenopathy.   SKIN:  Warm and moist, without petechiae, rashes, induration, or ecchymoses except for an ecchymosis on the left reconstructed breast.           NEUROLOGIC:  DTRs are 0-1+ bilaterally, symmetrical, motor function is 5/5,  and cranial nerves are  within normal limits.    Assessment:       1. Carcinoma of axillary tail of right breast in female, estrogen receptor positive      2.    LCIS  Plan:        I had a long discussion with her and her ;  Given her Oncotype score of 17 and the 1.2 cm carcinoma, I am not inclined to recommend chemotherapy.  We went over the Oncotype report and its significance.  I recommended that she start tamoxifen, that she should take for either 5 years (followed by 5 years of AIs if she becomes postmenopausal) of 10 years of she is still pre or perimenopasual 5 years from now.  The pros and cons of such an approach were discussed with her in detail.  I have explained to her that there is no benefit from chemotherapy to low risk patients based on the oncotype.  She voiced understadning.  We also discussed what the appropriate follow up acre should be.  She was given a prescription for tamoxifen 20 mg QD and I will see her again in 4-6 weeks for follow up.  Her multiple questions were answered to her satisfaction.

## 2018-05-18 PROBLEM — Z79.810 SERM USE (SELECTIVE ESTROGEN RECEPTOR MODULATOR): Status: ACTIVE | Noted: 2018-05-18

## 2018-05-18 NOTE — PROGRESS NOTES
Subjective:       Patient ID: Roberta Wheat is a 48 y.o. female.    Chief Complaint: No chief complaint on file.    HPI     Mrs. Wheat returns today for follow up.  She has been on tamoxifen for a month now and has tolerated it well.   At the time of her mastectomies she had a 1.2 cm carcinoma with mixed ductal and lobular features, while two sentinel nodes were negative.  There was LCIS in the nipple duct, and the nipples were removed.  Resection margins were clear.   .  Briefly, she is a 48-year-old  female who underwent bilateral mastectomies for a 12 mm mixed lobular and infiltrating ductal carcinoma that was ER strongly positive, CO positive, and HER-2 negative.  Ki-67 was 30%.  Her Oncotype score was 17    Review of Systems      Overall she feels OK.  She has tolerated the tamoxifen well, and has not experienced any side effects other than hot flashes.  She denies any depression, easy bruising, fevers, chills, night  sweats, weight loss, nausea, vomiting, diarrhea, constipation, diplopia, blurred vision, headache, chest pain, palpitations, shortness of breath, breast pain, abdominal pain, extremity pain, or difficulty ambulating.  The remainder of the ten-point ROS, including general, skin, lymph, H/N, cardiorespiratory, GI, , Neuro, Endocrine, and psychiatric is negative.     Objective:      Physical Exam      She is alert, oriented to time, place, person, pleasant, well      nourished, in no acute physical distress.                                VITAL SIGNS:  Reviewed                                      HEENT:  Normal.  There are no nasal, oral, lip, gingival, auricular, lid,    or conjunctival lesions.  Mucosae are moist and pink, and there is no        thrush.  Pupils are equal, reactive to light and accommodation.              Extraocular muscle movements are intact.  Dentition is good.  There is no frontal or maxillary tenderness.                                     NECK:  Supple  without JVD, adenopathy, or thyromegaly.                       LUNGS:  Clear to auscultation without wheezing, rales, or rhonchi.           CARDIOVASCULAR:  Reveals an S1, S2, no murmurs, no rubs, no gallops.         ABDOMEN:  Soft, nontender, without organomegaly.  Bowel sounds are    present.                                                                     EXTREMITIES:  No cyanosis, clubbing, or edema.                               BREASTS:  she is s/p bilateral mastectomies with prostheses in place.  Her incisions have healed nicely.                              LYMPHATIC:  There is no cervical, axillary, or supraclavicular adenopathy.   SKIN:  Warm and moist, without petechiae, rashes, induration, or ecchymoses.           NEUROLOGIC:  DTRs are 0-1+ bilaterally, symmetrical, motor function is 5/5,  and cranial nerves are  within normal limits.    Assessment:       1. Carcinoma of axillary tail of right breast in female, estrogen receptor positive    2. SERM use (selective estrogen receptor modulator)      2.    LCIS  Plan:        I had a long discussion with her. She will remain on tamoxifen, that she should take for either 5 years (followed by 5 years of AIs if she becomes postmenopausal) of 10 years of she is still pre or perimenopasual 5 years from now.  I will see her in 3 months   Her multiple questions were answered to her satisfaction.

## 2018-05-21 ENCOUNTER — OFFICE VISIT (OUTPATIENT)
Dept: HEMATOLOGY/ONCOLOGY | Facility: CLINIC | Age: 48
End: 2018-05-21
Payer: COMMERCIAL

## 2018-05-21 VITALS
HEIGHT: 65 IN | SYSTOLIC BLOOD PRESSURE: 110 MMHG | WEIGHT: 114 LBS | TEMPERATURE: 98 F | OXYGEN SATURATION: 99 % | DIASTOLIC BLOOD PRESSURE: 62 MMHG | RESPIRATION RATE: 18 BRPM | HEART RATE: 69 BPM | BODY MASS INDEX: 18.99 KG/M2

## 2018-05-21 DIAGNOSIS — C50.611 CARCINOMA OF AXILLARY TAIL OF RIGHT BREAST IN FEMALE, ESTROGEN RECEPTOR POSITIVE: Primary | ICD-10-CM

## 2018-05-21 DIAGNOSIS — Z79.810 SERM USE (SELECTIVE ESTROGEN RECEPTOR MODULATOR): ICD-10-CM

## 2018-05-21 DIAGNOSIS — Z17.0 CARCINOMA OF AXILLARY TAIL OF RIGHT BREAST IN FEMALE, ESTROGEN RECEPTOR POSITIVE: Primary | ICD-10-CM

## 2018-05-21 PROCEDURE — 3008F BODY MASS INDEX DOCD: CPT | Mod: CPTII,S$GLB,, | Performed by: INTERNAL MEDICINE

## 2018-05-21 PROCEDURE — 99999 PR PBB SHADOW E&M-EST. PATIENT-LVL III: CPT | Mod: PBBFAC,,, | Performed by: INTERNAL MEDICINE

## 2018-05-21 PROCEDURE — 3074F SYST BP LT 130 MM HG: CPT | Mod: CPTII,S$GLB,, | Performed by: INTERNAL MEDICINE

## 2018-05-21 PROCEDURE — 3078F DIAST BP <80 MM HG: CPT | Mod: CPTII,S$GLB,, | Performed by: INTERNAL MEDICINE

## 2018-05-21 PROCEDURE — 99213 OFFICE O/P EST LOW 20 MIN: CPT | Mod: S$GLB,,, | Performed by: INTERNAL MEDICINE

## 2018-07-10 ENCOUNTER — PATIENT MESSAGE (OUTPATIENT)
Dept: HEMATOLOGY/ONCOLOGY | Facility: CLINIC | Age: 48
End: 2018-07-10

## 2018-08-17 NOTE — PROGRESS NOTES
Subjective:       Patient ID: Roberta Wheat is a 48 y.o. female.    Chief Complaint: No chief complaint on file.    HPI     Mrs. Wheat returns today for follow up.  She has been on tamoxifen for 4 months now and has tolerated it well.   At the time of her mastectomies she had a 1.2 cm carcinoma with mixed ductal and lobular features, while two sentinel nodes were negative.  There was LCIS in the nipple duct, and the nipples were removed.  Resection margins were clear.   .  Briefly, she is a 48-year-old  female who underwent bilateral mastectomies for a 12 mm mixed lobular and infiltrating ductal carcinoma that was ER strongly positive, NV positive, and HER-2 negative.  Ki-67 was 30%.  Her Oncotype score was 17.    Review of Systems      Overall she feels OK.  She has tolerated the tamoxifen well, and has not experienced any side effects other than occasional hot flashes which, according to her, are manageable..  She denies any depression, easy bruising, fevers, chills, night  sweats, weight loss, nausea, vomiting, diarrhea, constipation, diplopia, blurred vision, headache, chest pain, palpitations, shortness of breath, breast pain, abdominal pain, extremity pain, or difficulty ambulating.  The remainder of the ten-point ROS, including general, skin, lymph, H/N, cardiorespiratory, GI, , Neuro, Endocrine, and psychiatric is negative.     Objective:      Physical Exam      She is alert, oriented to time, place, person, pleasant, well      nourished, in no acute physical distress.                                VITAL SIGNS:  Reviewed                                      HEENT:  Normal.  There are no nasal, oral, lip, gingival, auricular, lid,    or conjunctival lesions.  Mucosae are moist and pink, and there is no        thrush.  Pupils are equal, reactive to light and accommodation.              Extraocular muscle movements are intact.  Dentition is good.  There is no frontal or maxillary tenderness.                                      NECK:  Supple without JVD, adenopathy, or thyromegaly.                       LUNGS:  Clear to auscultation without wheezing, rales, or rhonchi.           CARDIOVASCULAR:  Reveals an S1, S2, no murmurs, no rubs, no gallops.         ABDOMEN:  Soft, nontender, without organomegaly.  Bowel sounds are    present.                                                                     EXTREMITIES:  No cyanosis, clubbing, or edema.                               BREASTS:  she is s/p bilateral mastectomies with prostheses in place.  Her incisions have healed nicely.                              LYMPHATIC:  There is no cervical, axillary, or supraclavicular adenopathy.   SKIN:  Warm and moist, without petechiae, rashes, induration, or ecchymoses.           NEUROLOGIC:  DTRs are 0-1+ bilaterally, symmetrical, motor function is 5/5,  and cranial nerves are  within normal limits.    Assessment:       1. Malignant neoplasm of overlapping sites of right breast in female, estrogen receptor positive    2. SERM use (selective estrogen receptor modulator)      2.    LCIS  Plan:        I had a long discussion with her. She will remain on tamoxifen, that she should take for either 5 years (followed by 5 years of AIs if she becomes postmenopausal) of 10 years of she is still pre or perimenopasual 5 years from now.  I will see her in 3 months   Her multiple questions were answered to her satisfaction.

## 2018-08-20 ENCOUNTER — OFFICE VISIT (OUTPATIENT)
Dept: HEMATOLOGY/ONCOLOGY | Facility: CLINIC | Age: 48
End: 2018-08-20
Payer: COMMERCIAL

## 2018-08-20 VITALS
SYSTOLIC BLOOD PRESSURE: 112 MMHG | DIASTOLIC BLOOD PRESSURE: 56 MMHG | TEMPERATURE: 99 F | RESPIRATION RATE: 20 BRPM | WEIGHT: 117.94 LBS | OXYGEN SATURATION: 100 % | HEART RATE: 78 BPM | BODY MASS INDEX: 19.63 KG/M2

## 2018-08-20 DIAGNOSIS — C50.811 MALIGNANT NEOPLASM OF OVERLAPPING SITES OF RIGHT BREAST IN FEMALE, ESTROGEN RECEPTOR POSITIVE: Primary | ICD-10-CM

## 2018-08-20 DIAGNOSIS — Z17.0 MALIGNANT NEOPLASM OF OVERLAPPING SITES OF RIGHT BREAST IN FEMALE, ESTROGEN RECEPTOR POSITIVE: Primary | ICD-10-CM

## 2018-08-20 DIAGNOSIS — Z79.810 SERM USE (SELECTIVE ESTROGEN RECEPTOR MODULATOR): ICD-10-CM

## 2018-08-20 PROCEDURE — 3078F DIAST BP <80 MM HG: CPT | Mod: CPTII,S$GLB,, | Performed by: INTERNAL MEDICINE

## 2018-08-20 PROCEDURE — 3008F BODY MASS INDEX DOCD: CPT | Mod: CPTII,S$GLB,, | Performed by: INTERNAL MEDICINE

## 2018-08-20 PROCEDURE — 99213 OFFICE O/P EST LOW 20 MIN: CPT | Mod: S$GLB,,, | Performed by: INTERNAL MEDICINE

## 2018-08-20 PROCEDURE — 99999 PR PBB SHADOW E&M-EST. PATIENT-LVL IV: CPT | Mod: PBBFAC,,, | Performed by: INTERNAL MEDICINE

## 2018-08-20 PROCEDURE — 3074F SYST BP LT 130 MM HG: CPT | Mod: CPTII,S$GLB,, | Performed by: INTERNAL MEDICINE

## 2018-08-29 ENCOUNTER — HOSPITAL ENCOUNTER (OUTPATIENT)
Dept: RADIOLOGY | Facility: OTHER | Age: 48
Discharge: HOME | End: 2018-08-29
Attending: SURGERY
Payer: COMMERCIAL

## 2018-08-29 ENCOUNTER — HOSPITAL ENCOUNTER (OUTPATIENT)
Dept: CARDIOLOGY | Facility: OTHER | Age: 48
Discharge: HOME | End: 2018-08-29
Attending: SURGERY
Payer: COMMERCIAL

## 2018-08-29 DIAGNOSIS — Z01.812 PRE-OPERATIVE LABORATORY EXAMINATION: Primary | ICD-10-CM

## 2018-08-29 DIAGNOSIS — Z01.811 PRE-OPERATIVE RESPIRATORY EXAMINATION: ICD-10-CM

## 2018-08-29 DIAGNOSIS — Z01.811 PRE-OPERATIVE RESPIRATORY EXAMINATION: Primary | ICD-10-CM

## 2018-08-29 PROCEDURE — 71046 X-RAY EXAM CHEST 2 VIEWS: CPT | Mod: TC,FY

## 2018-08-29 PROCEDURE — 93010 ELECTROCARDIOGRAM REPORT: CPT | Mod: ,,, | Performed by: INTERNAL MEDICINE

## 2018-08-29 PROCEDURE — 93005 ELECTROCARDIOGRAM TRACING: CPT

## 2018-08-29 PROCEDURE — 71046 X-RAY EXAM CHEST 2 VIEWS: CPT | Mod: 26,,, | Performed by: RADIOLOGY

## 2018-08-30 ENCOUNTER — LAB VISIT (OUTPATIENT)
Dept: LAB | Facility: OTHER | Age: 48
End: 2018-08-30
Attending: OBSTETRICS & GYNECOLOGY
Payer: COMMERCIAL

## 2018-08-30 ENCOUNTER — TELEPHONE (OUTPATIENT)
Dept: OBSTETRICS AND GYNECOLOGY | Facility: CLINIC | Age: 48
End: 2018-08-30

## 2018-08-30 ENCOUNTER — PATIENT MESSAGE (OUTPATIENT)
Dept: OBSTETRICS AND GYNECOLOGY | Facility: CLINIC | Age: 48
End: 2018-08-30

## 2018-08-30 DIAGNOSIS — E16.2 HYPOGLYCEMIA: Primary | ICD-10-CM

## 2018-08-30 DIAGNOSIS — E16.2 HYPOGLYCEMIA: ICD-10-CM

## 2018-08-30 LAB — GLUCOSE SERPL-MCNC: 113 MG/DL

## 2018-08-30 PROCEDURE — 82947 ASSAY GLUCOSE BLOOD QUANT: CPT

## 2018-08-30 PROCEDURE — 36415 COLL VENOUS BLD VENIPUNCTURE: CPT

## 2018-09-14 ENCOUNTER — PATIENT MESSAGE (OUTPATIENT)
Dept: OBSTETRICS AND GYNECOLOGY | Facility: CLINIC | Age: 48
End: 2018-09-14

## 2018-09-14 ENCOUNTER — TELEPHONE (OUTPATIENT)
Dept: OBSTETRICS AND GYNECOLOGY | Facility: CLINIC | Age: 48
End: 2018-09-14

## 2018-09-14 DIAGNOSIS — Z17.0 MALIGNANT NEOPLASM OF OVERLAPPING SITES OF RIGHT BREAST IN FEMALE, ESTROGEN RECEPTOR POSITIVE: Primary | ICD-10-CM

## 2018-09-14 DIAGNOSIS — M25.619 DECREASED RANGE OF MOTION OF SHOULDER, UNSPECIFIED LATERALITY: ICD-10-CM

## 2018-09-14 DIAGNOSIS — C50.811 MALIGNANT NEOPLASM OF OVERLAPPING SITES OF RIGHT BREAST IN FEMALE, ESTROGEN RECEPTOR POSITIVE: Primary | ICD-10-CM

## 2018-09-14 NOTE — TELEPHONE ENCOUNTER
Pt called stating she needs a referral for PT because she can not raise her arms over her head any wanted to so some PT. Put order in.

## 2018-09-17 NOTE — PROGRESS NOTES
[facility]  Physical Therapy Initial Evaluation    Name: Roberta Wheat  Clinic Number: [id number]    Therapy Diagnosis:   Encounter Diagnoses   Name Primary?    Malignant neoplasm of overlapping sites of right female breast, unspecified estrogen receptor status Yes    Carcinoma of axillary tail of right breast in female, estrogen receptor positive     Decreased range of motion of shoulder, unspecified laterality      Physician: Mily Morris, *    Physician Orders: PT Eval and Treat   Medical Diagnosis: IDC right breast  Evaluation Date: 2018  Authorization Period Expiration: 18  Plan of Care Certification Period: 18  Visit # / Visits authorized:   Insurance: [employer]    Time In: 9:00 AM  Time Out: 9:45 AM  Total Billable Time: 45 minutes    Precautions: Standard      History   History of Present Illness: Roberta Jiménez is a 48 y.o. female that presents to  [facility] at the [facility] s/p right breast surgery.   Diagnosis: Right breast IDC, ER (+) [place] (+) , HER2-britta (-)   Surgery: 3/21/18  bilateral mastectomies with SLNB with bilateral implant reconstruction under pectoralis muscle; scheduled for second stage reconstruction on 18.  Chemotherapy: Tamoxifen  Radiation: None  Chief complaint: patient with c/o decreased mobility bilateral shoulders, especially reaching upward.     Past Medical History:   Diagnosis Date    Migraine      Roberta Wheat  has a past surgical history that includes  section; Tonsillectomy; and Breast biopsy.    Roberta Jiménez has a current medication list which includes the following prescription(s): afluria [id number] (pf), butalbital-acetaminophen-caff, clindamycin-benzoyl peroxide, cyclobenzaprine, nortriptyline, ondansetron, promethazine, spironolactone, sumatriptan, tamoxifen, and zolmitriptan.    Review of patient's allergies indicates:   Allergen Reactions     "Bactrim [sulfamethoxazole-trimethoprim] Hives        Prior Therapy: right thumb injury about 3 years  Social History:  lives with their family and lives with their spouse  Occupation:    Prior Level of Function: Independent with all ADL's  Current Level of Function: reaching upward    Other Past Medical History: None    Patient's Goals: I want to get my shoulders moving    Hand dominance: Right Handed    Subjective   Pt states: having generalized muscle aching from Tamoxifen. States both shoulders feel stiff.   Pain: 0/10 on VAS.   Pain location: NA  Objective   Mental status :alert    Postural examination/scapula alignment: Rounded shoulder and Head forward      Skin Integrity:   Scar Location: bilateral breasts  Appearance: healed  Signs of infection: No  Drainage:None  Color:NA    Edema: None  Location: NA    Axillary Web Syndrome/Cording:   Location: right axilla  Degree of Cording: Mild (mild, moderate etc...)   Number of cords present: one    Sensation: numbness noted right axilla        Range of Motion:      Shoulder Range of Motion:   Active /Passive ROM Right Left   Flexion 140 120   Abduction 110 110   Extension 60 60   IR/90deg 85 85   ER/90deg 90 80          Strength: manual muscle test grades below   Upper Extremity Strength   (R) UE (L) UE   Shoulder flexion: 5/5 5/5   Shoulder Abduction: 5/5 5/5   Shoulder IR 5/5 5/5   Shoulder ER 5/5 5/5   Elbow flexion: 5/5 5/5   Elbow extension: 5/5 5/5   Wrist flexion: 5/5 5/5   Wrist extension: 5/5 5/5    5/5 5/5       Baseline Measurements of BL UE's for early detection of Lymphedema:     LANDMARK RIGHT UE LEFT UE DIFFERENCE   E + 8" 31 cm 31 cm 0 cm   E + 6" 29 cm 29 cm 0 cm   E + 4" 28 cm 28 cm 0 cm   E + 2" 26 cm 27 cm 1.0 cm   Elbow 23 cm 23.5 cm 0.5 cm   W+ 8" 23 cm 23.5 cm 0.5 cm   W +  6" 22.5 cm 23 cm 0.5 cm   W + 4" 19 cm 19 cm 0 cm   Wrist 15 cm 15 cm 0 cm   DPC 19 cm 19 cm 0 cm   IP Thumb 7 cm 7 cm 0 cm     Functional Mobility (Bed " "mobility, transfers)  I    ADL's:  I    Gait Assessment:   - AD used: none  - Assistance: independent  - Distance: community distances     Patient Education Provided   - role of therapy in multi - disciplinary team, goals for therapy  - HEP  - Pt was educated in lymphedema etiology and management plans.    - Pt was provided with written risk reductions and precautions for managing lymphedema.     Pt has no cultural, educational or language barriers to learning provided.  Treatment and Instruction of Home Exercise Program    Time In:9:30  Time Out: 9:45 AM    Roberta Jiménez received individual therapeutic exercises to improve postural correction and alignment, stretching and soft tissue mobility, and strengthening for 15 minutes including the following:   Supine Shld Flexion with Wand           1 x 10  Supine [place]                            1 x 10  Sidelying Shld Abd                                1 x 10  Standing Ext with wand                       1 x 10  Scap Retractions                                10 x 5" YTB        Written Home Exercises Provided and Patient Education: Handouts given   See EMR under patient instructions for program given  Pt demonstrated good understanding of the education provided. Patient demo good return demo of skill of exercises.            Functional Limitations Reporting      CMS Impairment/Limitation/Restriction for FOTO Outcomes Survey    Therapist reviewed FOTO scores for Roberta Wheat on 9/19/2018.   FOTO documents entered into [employer] - see Media section.    Limitations Score: 39%  Category: Carrying           Assessment   This is a 48 y.o. female referred to outpatient physical therapy and presents with a medical diagnosis of right breast cancer and was seen today post-operatively to establish PT plan of care for impairments following surgery including: decreased AROM of bilateral shoulders.     Pt instructed in HEP this session and was able to perform all exercises " given independently. Pt instructed to follow up with therapist if any concerns arise with program established. Pt will continue to benefit from skilled physical therapy to address the impairments stated in chart below, provide patient/family education and to maximize pt's level of independence in home and community environment     Anticipated barriers to physical therapy: None     Pt's spiritual, cultural and educational needs considered and pt agreeable to plan of care and goals as stated below:     Medical necessity is demonstrated by the following IMPAIRMENTS/PROMBLEM LIST:  History  Co-morbidities and personal factors that may impact the plan of care Examination  Body Structures and Functions, activity limitations and participation restrictions that may impact the plan of care    Clinical Presentation   Co-morbidities:   Breast cancer  S/p bilateral mastectomies with implant reconstruction          Personal Factors:   no deficits Body Regions:   upper extremities    Body Systems:   ROM        Participation Restrictions:   Reaching     Activity limitations:   Mobility  lifting and carrying objects    Self care  no deficits    Domestic Life  no deficits    Interactions/Relationships  no deficits    Life Areas  no deficits    Community and Social Life  no deficits         evolving clinical presentation with changing clinical characteristics                      moderate   moderate  moderate Decision Making/ Complexity Score:  moderate       Goals: Pt agrees with goals set    Short Term goals: 4 weeks  1. Patient will demonstrate 100% understanding of lymphedema risk reduction practices to include self monitoring for lymphedema. (progressing, not met)  2. Patient will demonstrate independence with Home Exercise program established. (progressing, not met)  3. Pt will increase AROM/PROM in shoulder abduction ROM to 140 degrees bilaterally to improve functional reach, carry, push, pull pain free. (progressing, not  met)  4. Pt will increase AROM/PROM in shoulder flexion to 160 degrees bilaterally to improve functional reach, carry, push, pull pain free.(progressing, not met)    Long Term Goals: 8 weeks   1.  Pt will increase AROM/PROM in shoulder flexion to 180 degrees bilaterally to improve functional reach, carry, push, pull pain free. (progressing, not met)  2. Pt will demonstrate full/maximized tissue mobility to increase ROM and promote healthy tissue to be pain free at discharge. (progressing, not met)  3. Pt will report decrease in overall worst pain to 2/10 at discharge. (progressing, not met)  4. Pt will increase AROM/PROM in shoulder abduction ROM to 180 degrees bilaterally to improve functional reach, carry, push, pull pain free. (progressing, not met)  5. Patient will report compliance with walking program 5x week for 10 min each day to improve overall cardiovascular function and decrease cancer related fatigue at discharge. (progressing, not met)      Plan   Outpatient physical therapy 2x week for 8 weeks to include the following:   Manual Therapy and Therapeutic Exercise.    Plan of care Certification Period: 9/19/2018 to 11/14/18.    Therapist: Pauline Bangura, PT  9/19/2018

## 2018-09-19 ENCOUNTER — CLINICAL SUPPORT (OUTPATIENT)
Dept: REHABILITATION | Facility: HOSPITAL | Age: 48
End: 2018-09-19
Payer: COMMERCIAL

## 2018-09-19 DIAGNOSIS — C50.811 MALIGNANT NEOPLASM OF OVERLAPPING SITES OF RIGHT FEMALE BREAST, UNSPECIFIED ESTROGEN RECEPTOR STATUS: Primary | ICD-10-CM

## 2018-09-19 DIAGNOSIS — C50.611 CARCINOMA OF AXILLARY TAIL OF RIGHT BREAST IN FEMALE, ESTROGEN RECEPTOR POSITIVE: ICD-10-CM

## 2018-09-19 DIAGNOSIS — M25.619 DECREASED RANGE OF MOTION OF SHOULDER, UNSPECIFIED LATERALITY: ICD-10-CM

## 2018-09-19 DIAGNOSIS — Z17.0 CARCINOMA OF AXILLARY TAIL OF RIGHT BREAST IN FEMALE, ESTROGEN RECEPTOR POSITIVE: ICD-10-CM

## 2018-09-19 PROCEDURE — 97110 THERAPEUTIC EXERCISES: CPT | Mod: PO | Performed by: PHYSICAL MEDICINE & REHABILITATION

## 2018-09-19 PROCEDURE — 97162 PT EVAL MOD COMPLEX 30 MIN: CPT | Mod: PO | Performed by: PHYSICAL MEDICINE & REHABILITATION

## 2018-09-19 NOTE — PLAN OF CARE
[facility]  Physical Therapy Initial Evaluation    Name: Roberta Wheat  Clinic Number: [id number]    Therapy Diagnosis:   Encounter Diagnoses   Name Primary?    Malignant neoplasm of overlapping sites of right female breast, unspecified estrogen receptor status Yes    Carcinoma of axillary tail of right breast in female, estrogen receptor positive     Decreased range of motion of shoulder, unspecified laterality      Physician: Mily Morris, *    Physician Orders: PT Eval and Treat   Medical Diagnosis: IDC right breast  Evaluation Date: 2018  Authorization Period Expiration: 18  Plan of Care Certification Period: 18  Visit # / Visits authorized:   Insurance: [employer]    Time In: 9:00 AM  Time Out: 9:45 AM  Total Billable Time: 45 minutes    Precautions: Standard      History   History of Present Illness: Roberta Jiménez is a 48 y.o. female that presents to  [facility] at the [facility] s/p right breast surgery.   Diagnosis: Right breast IDC, ER (+) [place] (+) , HER2-britta (-)   Surgery: 3/21/18  bilateral mastectomies with SLNB with bilateral implant reconstruction under pectoralis muscle; scheduled for second stage reconstruction on 18.  Chemotherapy: Tamoxifen  Radiation: None  Chief complaint: patient with c/o decreased mobility bilateral shoulders, especially reaching upward.     Past Medical History:   Diagnosis Date    Migraine      Roberta Wheat  has a past surgical history that includes  section; Tonsillectomy; and Breast biopsy.    Roberta Jiménez has a current medication list which includes the following prescription(s): afluria [id number] (pf), butalbital-acetaminophen-caff, clindamycin-benzoyl peroxide, cyclobenzaprine, nortriptyline, ondansetron, promethazine, spironolactone, sumatriptan, tamoxifen, and zolmitriptan.    Review of patient's allergies indicates:   Allergen Reactions     "Bactrim [sulfamethoxazole-trimethoprim] Hives        Prior Therapy: right thumb injury about 3 years  Social History:  lives with their family and lives with their spouse  Occupation:    Prior Level of Function: Independent with all ADL's  Current Level of Function: reaching upward    Other Past Medical History: None    Patient's Goals: I want to get my shoulders moving    Hand dominance: Right Handed    Subjective   Pt states: having generalized muscle aching from Tamoxifen. States both shoulders feel stiff.   Pain: 0/10 on VAS.   Pain location: NA  Objective   Mental status :alert    Postural examination/scapula alignment: Rounded shoulder and Head forward      Skin Integrity:   Scar Location: bilateral breasts  Appearance: healed  Signs of infection: No  Drainage:None  Color:NA    Edema: None  Location: NA    Axillary Web Syndrome/Cording:   Location: right axilla  Degree of Cording: Mild (mild, moderate etc...)   Number of cords present: one    Sensation: numbness noted right axilla        Range of Motion:      Shoulder Range of Motion:   Active /Passive ROM Right Left   Flexion 140 120   Abduction 110 110   Extension 60 60   IR/90deg 85 85   ER/90deg 90 80          Strength: manual muscle test grades below   Upper Extremity Strength   (R) UE (L) UE   Shoulder flexion: 5/5 5/5   Shoulder Abduction: 5/5 5/5   Shoulder IR 5/5 5/5   Shoulder ER 5/5 5/5   Elbow flexion: 5/5 5/5   Elbow extension: 5/5 5/5   Wrist flexion: 5/5 5/5   Wrist extension: 5/5 5/5    5/5 5/5       Baseline Measurements of BL UE's for early detection of Lymphedema:     LANDMARK RIGHT UE LEFT UE DIFFERENCE   E + 8" 31 cm 31 cm 0 cm   E + 6" 29 cm 29 cm 0 cm   E + 4" 28 cm 28 cm 0 cm   E + 2" 26 cm 27 cm 1.0 cm   Elbow 23 cm 23.5 cm 0.5 cm   W+ 8" 23 cm 23.5 cm 0.5 cm   W +  6" 22.5 cm 23 cm 0.5 cm   W + 4" 19 cm 19 cm 0 cm   Wrist 15 cm 15 cm 0 cm   DPC 19 cm 19 cm 0 cm   IP Thumb 7 cm 7 cm 0 cm     Functional Mobility (Bed " "mobility, transfers)  I    ADL's:  I    Gait Assessment:   - AD used: none  - Assistance: independent  - Distance: community distances     Patient Education Provided   - role of therapy in multi - disciplinary team, goals for therapy  - HEP  - Pt was educated in lymphedema etiology and management plans.    - Pt was provided with written risk reductions and precautions for managing lymphedema.     Pt has no cultural, educational or language barriers to learning provided.  Treatment and Instruction of Home Exercise Program    Time In:9:30  Time Out: 9:45 AM    Roberta Jiménez received individual therapeutic exercises to improve postural correction and alignment, stretching and soft tissue mobility, and strengthening for 15 minutes including the following:   Supine Shld Flexion with Wand           1 x 10  Supine [place]                            1 x 10  Sidelying Shld Abd                                1 x 10  Standing Ext with wand                       1 x 10  Scap Retractions                                10 x 5" YTB        Written Home Exercises Provided and Patient Education: Handouts given   See EMR under patient instructions for program given  Pt demonstrated good understanding of the education provided. Patient demo good return demo of skill of exercises.            Functional Limitations Reporting      CMS Impairment/Limitation/Restriction for FOTO Outcomes Survey    Therapist reviewed FOTO scores for Roberta Wheat on 9/19/2018.   FOTO documents entered into [employer] - see Media section.    Limitations Score: 39%  Category: Carrying           Assessment   This is a 48 y.o. female referred to outpatient physical therapy and presents with a medical diagnosis of right breast cancer and was seen today post-operatively to establish PT plan of care for impairments following surgery including: decreased AROM of bilateral shoulders.     Pt instructed in HEP this session and was able to perform all exercises " given independently. Pt instructed to follow up with therapist if any concerns arise with program established. Pt will continue to benefit from skilled physical therapy to address the impairments stated in chart below, provide patient/family education and to maximize pt's level of independence in home and community environment     Anticipated barriers to physical therapy: None     Pt's spiritual, cultural and educational needs considered and pt agreeable to plan of care and goals as stated below:     Medical necessity is demonstrated by the following IMPAIRMENTS/PROMBLEM LIST:  History  Co-morbidities and personal factors that may impact the plan of care Examination  Body Structures and Functions, activity limitations and participation restrictions that may impact the plan of care    Clinical Presentation   Co-morbidities:   Breast cancer  S/p bilateral mastectomies with implant reconstruction          Personal Factors:   no deficits Body Regions:   upper extremities    Body Systems:   ROM        Participation Restrictions:   Reaching     Activity limitations:   Mobility  lifting and carrying objects    Self care  no deficits    Domestic Life  no deficits    Interactions/Relationships  no deficits    Life Areas  no deficits    Community and Social Life  no deficits         evolving clinical presentation with changing clinical characteristics                      moderate   moderate  moderate Decision Making/ Complexity Score:  moderate       Goals: Pt agrees with goals set    Short Term goals: 4 weeks  1. Patient will demonstrate 100% understanding of lymphedema risk reduction practices to include self monitoring for lymphedema. (progressing, not met)  2. Patient will demonstrate independence with Home Exercise program established. (progressing, not met)  3. Pt will increase AROM/PROM in shoulder abduction ROM to 140 degrees bilaterally to improve functional reach, carry, push, pull pain free. (progressing, not  met)  4. Pt will increase AROM/PROM in shoulder flexion to 160 degrees bilaterally to improve functional reach, carry, push, pull pain free.(progressing, not met)    Long Term Goals: 8 weeks   1.  Pt will increase AROM/PROM in shoulder flexion to 180 degrees bilaterally to improve functional reach, carry, push, pull pain free. (progressing, not met)  2. Pt will demonstrate full/maximized tissue mobility to increase ROM and promote healthy tissue to be pain free at discharge. (progressing, not met)  3. Pt will report decrease in overall worst pain to 2/10 at discharge. (progressing, not met)  4. Pt will increase AROM/PROM in shoulder abduction ROM to 180 degrees bilaterally to improve functional reach, carry, push, pull pain free. (progressing, not met)  5. Patient will report compliance with walking program 5x week for 10 min each day to improve overall cardiovascular function and decrease cancer related fatigue at discharge. (progressing, not met)      Plan   Outpatient physical therapy 2x week for 8 weeks to include the following:   Manual Therapy and Therapeutic Exercise.    Plan of care Certification Period: 9/19/2018 to 11/14/18.    Therapist: Pauline Bangura, PT  9/19/2018

## 2018-09-19 NOTE — PATIENT INSTRUCTIONS
ROM: Flexion - Wand (Supine)        Lie on back holding wand. Raise arms over head.   Repeat _10___ times per set. Do __1__ sets per session. Do _2___ sessions per day.     [email or web address]      Copyright © [employer]. All rights reserved.              Sidelying Shoulder Abduction            Sidelying Shoulder Abduction    Lie on your right/left side with right/left arm on top. Keep your elbow straight. Lift your arm upward toward your head.  Perform __10__ times. Perform __1__sets.   Perform _2___ times per day.  Copyright © 8135-6874 [employer]ROM:       Scapular Retraction: Rowing (Eccentric) - Arms - Side (Resistance Band)        Hold end of band in each hand. Pull  Elbows backward against the band. Hold for 5 seconds. Use green resistance band, do 1-2 sets of 10 reps each 1x day everyday.             ROM: Abduction (Lying down)    Bring arms straight out from sides and raise as high as possible without pain.  Repeat 10 times. Do 1 sessions 2x per day.          Extension - Wand (Standing)        Stand holding wand behind back. Raise arms as far as possible.  Repeat __10__ times per set. Do __1__ sets per session. Do _2___ sessions per day.     [email or web address]     Copyright © [employer]. All rights reserved.                                                    Lymphedema - Identification and Prevention     Lymphedema - is the swelling of a body area or extremity caused by the accumulation of lymphatic fluid.  There is a risk for lymphedema with the removal of lymph nodes, trauma or radiation therapy.  Treatment of breast cancer often involves surgery: mastectomy or lumpectomy. Some of the lymph nodes in the underarm (called axillary lymph nodes) may be removed and checked to see if they contain cancer cells.     During breast surgery when axillary lymph nodes are removed (with sentinel node biopsy or axillary dissection) or are treated with radiation therapy, the lymphatic system may become impaired. This may  prevent lymphatic fluid from leaving the area therefore, causing lymphedema.     Lymphatic fluid is a normal part of the circulatory system. Its function is to remove waste products and to produce cells vital to fighting infection. Swelling occurs when the vessels become restricted and the lymphatic fluid is unable to freely flow through them.  If lymphedema is left untreated, the affected limb could progressively become more swollen, which could lead to hardening of the skin, bulkiness in the limb, infection and impaired wound healing.         There are things you can do to decrease the chance of developing lymphedema.                                          [email or web address].org/riskreduction                                                                                                                                                  The information presented is intended for general information and educational purposes. It is not intended to replace the  advice of your health care provider. Contact your health care provider if you believe you have a health problem.

## 2018-10-25 NOTE — PROGRESS NOTES
9                                                    Physical Therapy Daily Treatment Note     Name: Roberta Wheat  Clinic Number: [id number]  Diagnosis:   Encounter Diagnoses   Name Primary?    Decreased range of motion of shoulder, unspecified laterality     Carcinoma of axillary tail of right breast in female, estrogen receptor positive     Malignant neoplasm of overlapping sites of right female breast, unspecified estrogen receptor status      Physician: Mily Morris, *    Precautions: Second stage breast reconstruction 9/20/18  Visit #: 2 of   Time In: 9:00 AM  Time Out: 9:45 AM  Total Treatment Time 1:1: 45 minutes    Evaluation Date: 9/19/18  Visit # authorized:   Authorization period: 12/31/18  Plan of care Expiration: 11/14/18  MD referral: Mily Morris MD  Insurance: [facility]      Subjective     Pt reports: had second stage reconstruction 9/20/18. States her left breast was higher than right and given a compression strap to wear to push breast down.States her left shoulder feels stiff and tends to get pain in her scapular muscles  Pain Scale: Roberta Jiménez rates pain on a scale of 4/10 on VAS.   Pain location: left breast due to compression stap    Fatigue: Mild  Functional change: None  Diagnosis: Right breast IDC, ER (+) MI (+) , HER2-britta (-)   Surgery: 3/21/18  bilateral mastectomies with SLNB with bilateral implant reconstruction under pectoralis muscle; scheduled for second stage reconstruction on 9/20/18.  Chemotherapy: Tamoxifen  Radiation: None      Objective     Shoulder Range of Motion: 10/26/18  Active /Passive ROM Right Left   Flexion 125 130   Abduction 100 80   Extension 65 65   IR/90deg 70 70   ER/90deg 80 70           Strength: manual muscle test grades below   Upper Extremity Strength: 10/26/18    (R) UE (L) UE   Shoulder flexion: 5/5 5/5   Shoulder Abduction: 5/5 5/5   Shoulder IR 5/5 5/5   Shoulder ER 5/5 5/5   Elbow flexion: 5/5 5/5   Elbow extension: 5/5 5/5  "  Wrist flexion: 5/5 5/5   Wrist extension: 5/5 5/5    5/5 5/5         Roberta Jiménez received individual therapeutic exercises to improve postural correction and alignment, stretching and soft tissue mobility, and strengthening for 30 minutes including the following:   Supine Shld Flexion with Wand           1 x 10  Supine Shld ER with wand                   1 x 10  Supine [place]                            1 x 10  Butterflies                                             1 x 10  Sidelying Shld Abd                               1 x 10  Standing Ext with wand                       1 x 10  Scap Retractions                                10 x 5" YTB    Roberta Jiménez received the following manual therapy techniques were performed to increased myofascial/soft tissue length, mobility and pliability, increase PROM, AROM and function as well as to decrease pain for 10 minutes  Bilateral anterior chest stretch  Bilateral shoulder stretch all motions    Home Exercise Program and Patient Education   Education provided re:  - role of PT in multi - disciplinary team, goals for PT  - progress towards goals     See EMR under notes/patient instructions for HEP given/taught this session - all sets and reps included. Pt received printed copy.     Pt was able to demonstrate and report understanding and performance  Pt has no cultural, educational or language barriers to learning provided.        Assessment     Patient is responding well to physical therapy. Is at expected function and pain level for 5 weeks post operatively.   Pain after treatment: 2/10    Pt prognosis is Good. Patient returns to physical therapy s/p second stage reconstruction. Patient received manual therapy as above. Patient performed above exercise program and tolerated addition of ER with wand and butterflies. Patient instructed to continue with HEP. Patient displays decreased AROM bilateral shoulders. Pt will continue to benefit from skilled outpatient " physical therapy to address the deficits listed in the problem list chart on initial evaluation, provide pt/family education and to maximize pt's level of independence in the home and community environment.     Goals as follows:  Short Term goals: 4 weeks  1. Patient will demonstrate 100% understanding of lymphedema risk reduction practices to include self monitoring for lymphedema. (progressing, not met)  2. Patient will demonstrate independence with Home Exercise program established. (progressing, not met)  3. Pt will increase AROM/PROM in shoulder abduction ROM to 140 degrees bilaterally to improve functional reach, carry, push, pull pain free. (progressing, not met)  4. Pt will increase AROM/PROM in shoulder flexion to 160 degrees bilaterally to improve functional reach, carry, push, pull pain free.(progressing, not met)     Long Term Goals: 8 weeks   1.  Pt will increase AROM/PROM in shoulder flexion to 180 degrees bilaterally to improve functional reach, carry, push, pull pain free. (progressing, not met)  2. Pt will demonstrate full/maximized tissue mobility to increase ROM and promote healthy tissue to be pain free at discharge. (progressing, not met)  3. Pt will report decrease in overall worst pain to 2/10 at discharge. (progressing, not met)  4. Pt will increase AROM/PROM in shoulder abduction ROM to 180 degrees bilaterally to improve functional reach, carry, push, pull pain free. (progressing, not met)  5. Patient will report compliance with walking program 5x week for 10 min each day to improve overall cardiovascular function and decrease cancer related fatigue at discharge. (progressing, not met)        Plan     Continue with established POC toward physical therapy goals    Therapist: Pauline Bangura, PT  10/26/2018

## 2018-10-26 ENCOUNTER — CLINICAL SUPPORT (OUTPATIENT)
Dept: REHABILITATION | Facility: HOSPITAL | Age: 48
End: 2018-10-26
Attending: OBSTETRICS & GYNECOLOGY
Payer: COMMERCIAL

## 2018-10-26 DIAGNOSIS — C50.811 MALIGNANT NEOPLASM OF OVERLAPPING SITES OF RIGHT FEMALE BREAST, UNSPECIFIED ESTROGEN RECEPTOR STATUS: ICD-10-CM

## 2018-10-26 DIAGNOSIS — M25.619 DECREASED RANGE OF MOTION OF SHOULDER, UNSPECIFIED LATERALITY: ICD-10-CM

## 2018-10-26 DIAGNOSIS — C50.611 CARCINOMA OF AXILLARY TAIL OF RIGHT BREAST IN FEMALE, ESTROGEN RECEPTOR POSITIVE: ICD-10-CM

## 2018-10-26 DIAGNOSIS — Z17.0 CARCINOMA OF AXILLARY TAIL OF RIGHT BREAST IN FEMALE, ESTROGEN RECEPTOR POSITIVE: ICD-10-CM

## 2018-10-26 PROCEDURE — 97110 THERAPEUTIC EXERCISES: CPT | Mod: PO | Performed by: PHYSICAL MEDICINE & REHABILITATION

## 2018-10-26 PROCEDURE — 97140 MANUAL THERAPY 1/> REGIONS: CPT | Mod: PO | Performed by: PHYSICAL MEDICINE & REHABILITATION

## 2018-10-26 NOTE — PATIENT INSTRUCTIONS
ROM: External Rotation - Wand (supine)    Lie on back holding wand with elbows bent to 90°. Rotate forearms over head as far as possible.   Repeat ___10_ times per set. Do __1__ sets per session. Do __2__ sessions per day.        Butterflies       Place hands behind your head. Move elbows up and down  Perform 10 times. Perform 2 times a day.

## 2018-10-31 ENCOUNTER — CLINICAL SUPPORT (OUTPATIENT)
Dept: REHABILITATION | Facility: HOSPITAL | Age: 48
End: 2018-10-31
Attending: OBSTETRICS & GYNECOLOGY
Payer: COMMERCIAL

## 2018-10-31 DIAGNOSIS — M25.612 DECREASED RANGE OF MOTION OF LEFT SHOULDER: ICD-10-CM

## 2018-10-31 DIAGNOSIS — Z17.0 CARCINOMA OF AXILLARY TAIL OF RIGHT BREAST IN FEMALE, ESTROGEN RECEPTOR POSITIVE: ICD-10-CM

## 2018-10-31 DIAGNOSIS — C50.611 CARCINOMA OF AXILLARY TAIL OF RIGHT BREAST IN FEMALE, ESTROGEN RECEPTOR POSITIVE: ICD-10-CM

## 2018-10-31 PROCEDURE — 97140 MANUAL THERAPY 1/> REGIONS: CPT | Mod: PO | Performed by: PHYSICAL MEDICINE & REHABILITATION

## 2018-10-31 PROCEDURE — 97110 THERAPEUTIC EXERCISES: CPT | Mod: PO | Performed by: PHYSICAL MEDICINE & REHABILITATION

## 2018-10-31 NOTE — PATIENT INSTRUCTIONS
Wall Climb    Perform this exercise two (2) times a day with five (5) repetitions.    Stand and FACE THE WALL with your toes 10 to 12 inches away from the wall.    a. Place the fingers of your affected arm on the wall and slowly walk your fingers up the wall. Let your fingers climb the wall as high as possible without feeling pulling or pain.  b. Hold this stretch for 5 seconds then move your fingers back down the wall.  c. Try to go a little higher each time. It may relax you a bit if you rest your head against the wall.    Repeat the above exercises standing with your side to the wall.            Wall Walk (Shoulder Abduction)  Using your affected arm, walk your hand up  a wall straight out to the side, as high as you  are able.   Perform 5 times holding for count of 5. Perform 2 times a day.      Wall Push-ups      Facing corner of the wall with both hands on wall at shoulder height and shoulder-width apart. Keeping body straight, lean forward allowing elbows to bend then push out again.  Repeat 5 times. Do 1 sessions 2x per day.        Bilateral Arm Curl    Sit holding 3 lb dumbbell in each hand. Bend elbows.   Repeat 10 times. Do 2 sessions 2x per day.      Tricep Strength    Lie comfortably on back with a weight in one palm. Bend arm so elbow points up and weight gently hangs. Keep shoulder flat on floor. Raise hand to straighten arm. Repeat with other arm. Use 3 pounds.  Repeat 10 times. Do 2 sessions 2x per day.

## 2018-10-31 NOTE — PROGRESS NOTES
"9                                                    Physical Therapy Daily Treatment Note     Name: Roberta Jiménez [place]  Clinic Number: [id number]  Diagnosis:   Encounter Diagnoses   Name Primary?    Decreased range of motion of left shoulder     Carcinoma of axillary tail of right breast in female, estrogen receptor positive      Physician: Mily Morris, *    Precautions: Second stage breast reconstruction 9/20/18  Visit #: 3 of 20  Time In: 8:05 AM  Time Out: 8:50 AM  Total Treatment Time 1:1: 45 minutes    Evaluation Date: 9/19/18  Visit # authorized: 20  Authorization period: 12/31/18  Plan of care Expiration: 11/14/18  MD referral: Mily Morris MD  Insurance: [facility]      Subjective     Pt reports: States her left shoulder feels stiff and tends to get pain in her scapular muscles  Pain Scale: Roberta Jiménez rates pain on a scale of 3/10 on VAS.   Pain location: left breast due to compression stap and left shoulder    Fatigue: Mild  Functional change: None  Diagnosis: Right breast IDC, ER (+) [place] (+) , HER2-britta (-)   Surgery: 3/21/18  bilateral mastectomies with SLNB with bilateral implant reconstruction under pectoralis muscle; scheduled for second stage reconstruction on 9/20/18.  Chemotherapy: Tamoxifen  Radiation: None      Objective         Roberta Jiménez received individual therapeutic exercises to improve postural correction and alignment, stretching and soft tissue mobility, and strengthening for 30 minutes including the following:   Supine Shld Flexion with Wand            1 x 10  Supine Shld ER with wand                   1 x 10  Supine [place]                            1 x 10  Butterflies                                             1 x 10  Triceps extensions                               1 x 10 3#  Sidelying Shld Abd  (B)                        1 x 10  Standing Ext with wand                       1 x 10  Scap Retractions                                 2 x 10 x 5" BTB  Wall Climbs  " " Forward                         5 x 5"                        Sideways                       5 x 5"  Corner Wall Push ups                         5 x 5 "  Biceps Curls                                       2 x 10 3#    Roberta Jiménez received the following manual therapy techniques were performed to increased myofascial/soft tissue length, mobility and pliability, increase PROM, AROM and function as well as to decrease pain for 15 minutes  Bilateral anterior chest stretch  Bilateral shoulder stretch all motions  Passive Stretch all shoulder motions    Home Exercise Program and Patient Education   Education provided re:  - role of PT in multi - disciplinary team, goals for PT  - progress towards goals     See EMR under notes/patient instructions for HEP given/taught this session - all sets and reps included. Pt received printed copy.     Pt was able to demonstrate and report understanding and performance  Pt has no cultural, educational or language barriers to learning provided.        Assessment     Patient is responding well to physical therapy. Is at expected function and pain level for 6 weeks post operatively.   Pain after treatment: 2/10    Pt prognosis is Good. Patient received manual therapy as above. Patient performed above exercise program and tolerated addition of wall climbs. Wall push ups (to stretch bilateral pectoral muscles) and biceps/triceps strengthening. Patient instructed to continue with HEP.  Pt will continue to benefit from skilled outpatient physical therapy to address the deficits listed in the problem list chart on initial evaluation, provide pt/family education and to maximize pt's level of independence in the home and community environment.     Goals as follows:  Short Term goals: 4 weeks  1. Patient will demonstrate 100% understanding of lymphedema risk reduction practices to include self monitoring for lymphedema. (progressing, not met)  2. Patient will demonstrate independence with " Home Exercise program established. (progressing, not met)  3. Pt will increase AROM/PROM in shoulder abduction ROM to 140 degrees bilaterally to improve functional reach, carry, push, pull pain free. (progressing, not met)  4. Pt will increase AROM/PROM in shoulder flexion to 160 degrees bilaterally to improve functional reach, carry, push, pull pain free.(progressing, not met)     Long Term Goals: 8 weeks   1.  Pt will increase AROM/PROM in shoulder flexion to 180 degrees bilaterally to improve functional reach, carry, push, pull pain free. (progressing, not met)  2. Pt will demonstrate full/maximized tissue mobility to increase ROM and promote healthy tissue to be pain free at discharge. (progressing, not met)  3. Pt will report decrease in overall worst pain to 2/10 at discharge. (progressing, not met)  4. Pt will increase AROM/PROM in shoulder abduction ROM to 180 degrees bilaterally to improve functional reach, carry, push, pull pain free. (progressing, not met)  5. Patient will report compliance with walking program 5x week for 10 min each day to improve overall cardiovascular function and decrease cancer related fatigue at discharge. (progressing, not met)        Plan     Continue with established POC toward physical therapy goals    Therapist: Pauline Bangura, PT  10/31/2018

## 2018-11-09 ENCOUNTER — CLINICAL SUPPORT (OUTPATIENT)
Dept: REHABILITATION | Facility: HOSPITAL | Age: 48
End: 2018-11-09
Payer: COMMERCIAL

## 2018-11-09 DIAGNOSIS — M25.619 DECREASED RANGE OF MOTION OF SHOULDER, UNSPECIFIED LATERALITY: ICD-10-CM

## 2018-11-09 DIAGNOSIS — Z17.0 CARCINOMA OF AXILLARY TAIL OF RIGHT BREAST IN FEMALE, ESTROGEN RECEPTOR POSITIVE: ICD-10-CM

## 2018-11-09 DIAGNOSIS — C50.811 MALIGNANT NEOPLASM OF OVERLAPPING SITES OF RIGHT FEMALE BREAST, UNSPECIFIED ESTROGEN RECEPTOR STATUS: ICD-10-CM

## 2018-11-09 DIAGNOSIS — C50.611 CARCINOMA OF AXILLARY TAIL OF RIGHT BREAST IN FEMALE, ESTROGEN RECEPTOR POSITIVE: ICD-10-CM

## 2018-11-09 PROCEDURE — 97140 MANUAL THERAPY 1/> REGIONS: CPT | Mod: PO | Performed by: PHYSICAL MEDICINE & REHABILITATION

## 2018-11-09 PROCEDURE — 97110 THERAPEUTIC EXERCISES: CPT | Mod: PO | Performed by: PHYSICAL MEDICINE & REHABILITATION

## 2018-11-09 NOTE — PROGRESS NOTES
9                                                    Physical Therapy Daily Treatment Note     Name: Roberta Wheat  Clinic Number: [id number]  Diagnosis:   Encounter Diagnoses   Name Primary?    Decreased range of motion of shoulder, unspecified laterality     Carcinoma of axillary tail of right breast in female, estrogen receptor positive     Malignant neoplasm of overlapping sites of right female breast, unspecified estrogen receptor status      Physician: Mily Morris, *    Precautions: Second stage breast reconstruction 9/20/18  Visit #: 4 of 20  Time In: 8:00 AM  Time Out: 8:45 AM  Total Treatment Time 1:1: 45 minutes    Evaluation Date: 9/19/18  Visit # authorized: 20  Authorization period: 12/31/18  Plan of care Expiration: 11/14/18  MD referral: Mily Morris MD  Insurance: [facility]      Subjective     Pt reports: States her right elbow is sore today.States used 10# weight with exercises yesterday and feels she aggravated her right elbow--points to right triceps insertion.  States she had tendinitis in right elbow after first surgery (lateral epicondylitis) and had an injection which helped her right elbow pain to decrease.   Pain Scale: Roberta Jiménez rates pain on a scale of 6/10 on VAS. 0/10 in shoulders  Pain location:right elbow    Fatigue: Mild  Functional change: None  Diagnosis: Right breast IDC, ER (+) [place] (+) , HER2-britta (-)   Surgery: 3/21/18  bilateral mastectomies with SLNB with bilateral implant reconstruction under pectoralis muscle; scheduled for second stage reconstruction on 9/20/18.  Chemotherapy: Tamoxifen  Radiation: None      Objective         Roberta Jiménez received individual therapeutic exercises to improve postural correction and alignment, stretching and soft tissue mobility, and strengthening for 30 minutes including the following:   Supine Shld Flexion with Wand            1 x 10  Supine Shld ER with wand                   1 x 10  Supine [place]               "              1 x 10  Butterflies                                             1 x 10  Triceps extensions                               1 x 10 3# Not today  Sidelying Shld Abd  (B)                        1 x 10  Standing Ext with wand                       1 x 10  Scap Retractions                                 2 x 10 x 5" BTB  Wall Climbs   Forward                         5 x 5"                        Sideways                       5 x 5"  Corner Wall Push ups                         5 x 5 "  Not today  Biceps Curls                                       2 x 10 3# Not today    Roberta Jiménez received the following manual therapy techniques were performed to increased myofascial/soft tissue length, mobility and pliability, increase PROM, AROM and function as well as to decrease pain for 15 minutes  Bilateral anterior chest stretch  Bilateral shoulder stretch all motions    Shoulder Range of Motion: 11/9/18  Active /Passive ROM Right Left   Flexion 170 170   Abduction 180 175   Extension 80 80   IR/90deg 85 85   ER/90deg 90 90            Home Exercise Program and Patient Education   Education provided re:  - role of PT in multi - disciplinary team, goals for PT  - progress towards goals     See EMR under notes/patient instructions for HEP given/taught this session - all sets and reps included. Pt received printed copy.     Pt was able to demonstrate and report understanding and performance  Pt has no cultural, educational or language barriers to learning provided.    CMS Impairment/Limitation/Restriction for FOTO Outcomes Survey     Therapist reviewed FOTO scores for Roberta Wheat on 11/9/2018.   FOTO documents entered into [employer] - see Media section.     Limitations Score: 40%  Category: Carrying          Assessment     Patient is responding well to physical therapy. Is at expected function and pain level for 7 weeks post operatively.   Pain after treatment: 3/10    Pt prognosis is Good. Patient received " manual therapy as above. Patient performed above exercise program and did not perform biceps/triceps or wall pushups today due to right elbow soreness. Appears patient overdid/strained right elbow with using 10# weight with exerices. . Patient has made great improvements with AROM of bilateral shoulders.  Patient instructed to continue with HEP and to let right elbow soreness to resolve and to use lighter weights with exercises. .  Pt will continue to benefit from skilled outpatient physical therapy to address the deficits listed in the problem list chart on initial evaluation, provide pt/family education and to maximize pt's level of independence in the home and community environment. Patient met all STG this session.    Goals as follows:  Short Term goals: 4 weeks  1. Patient will demonstrate 100% understanding of lymphedema risk reduction practices to include self monitoring for lymphedema. (met, 11/9/18)  2. Patient will demonstrate independence with Home Exercise program established. (met, 11/9/18)  3. Pt will increase AROM/PROM in shoulder abduction ROM to 140 degrees bilaterally to improve functional reach, carry, push, pull pain free. (met, 11/9/18)  4. Pt will increase AROM/PROM in shoulder flexion to 160 degrees bilaterally to improve functional reach, carry, push, pull pain free.(met, 11/9/18)     Long Term Goals: 8 weeks   1.  Pt will increase AROM/PROM in shoulder flexion to 180 degrees bilaterally to improve functional reach, carry, push, pull pain free. (progressing, not met)  2. Pt will demonstrate full/maximized tissue mobility to increase ROM and promote healthy tissue to be pain free at discharge. (progressing, not met)  3. Pt will report decrease in overall worst pain to 2/10 at discharge. (progressing, not met)  4. Pt will increase AROM/PROM in shoulder abduction ROM to 180 degrees bilaterally to improve functional reach, carry, push, pull pain free. (progressing, not met)  5. Patient will  report compliance with walking program 5x week for 10 min each day to improve overall cardiovascular function and decrease cancer related fatigue at discharge. (progressing, not met)        Plan     Continue with established POC toward physical therapy goals. Will plan for discharge next session.    Therapist: Pauline Bangura, PT  11/9/2018

## 2018-11-19 ENCOUNTER — OFFICE VISIT (OUTPATIENT)
Dept: HEMATOLOGY/ONCOLOGY | Facility: CLINIC | Age: 48
End: 2018-11-19
Payer: COMMERCIAL

## 2018-11-19 VITALS
OXYGEN SATURATION: 98 % | HEART RATE: 75 BPM | HEIGHT: 64 IN | BODY MASS INDEX: 20.14 KG/M2 | DIASTOLIC BLOOD PRESSURE: 52 MMHG | RESPIRATION RATE: 16 BRPM | WEIGHT: 117.94 LBS | TEMPERATURE: 98 F | SYSTOLIC BLOOD PRESSURE: 95 MMHG

## 2018-11-19 DIAGNOSIS — Z79.810 SERM USE (SELECTIVE ESTROGEN RECEPTOR MODULATOR): ICD-10-CM

## 2018-11-19 DIAGNOSIS — Z17.0 CARCINOMA OF AXILLARY TAIL OF RIGHT BREAST IN FEMALE, ESTROGEN RECEPTOR POSITIVE: Primary | ICD-10-CM

## 2018-11-19 DIAGNOSIS — C50.611 CARCINOMA OF AXILLARY TAIL OF RIGHT BREAST IN FEMALE, ESTROGEN RECEPTOR POSITIVE: Primary | ICD-10-CM

## 2018-11-19 PROCEDURE — 99999 PR PBB SHADOW E&M-EST. PATIENT-LVL IV: CPT | Mod: PBBFAC,,, | Performed by: INTERNAL MEDICINE

## 2018-11-19 PROCEDURE — 3078F DIAST BP <80 MM HG: CPT | Mod: CPTII,S$GLB,, | Performed by: INTERNAL MEDICINE

## 2018-11-19 PROCEDURE — 3008F BODY MASS INDEX DOCD: CPT | Mod: CPTII,S$GLB,, | Performed by: INTERNAL MEDICINE

## 2018-11-19 PROCEDURE — 99213 OFFICE O/P EST LOW 20 MIN: CPT | Mod: S$GLB,,, | Performed by: INTERNAL MEDICINE

## 2018-11-19 PROCEDURE — 3074F SYST BP LT 130 MM HG: CPT | Mod: CPTII,S$GLB,, | Performed by: INTERNAL MEDICINE

## 2018-11-19 NOTE — PROGRESS NOTES
Subjective:       Patient ID: Roberta Wheat is a 48 y.o. female.    Chief Complaint: No chief complaint on file.    HPI     Mrs. Wheat returns today for follow up.  She has been on tamoxifen for 7 months now and has tolerated it well.   At the time of her mastectomies she had a 1.2 cm carcinoma with mixed ductal and lobular features, while two sentinel nodes were negative.  There was LCIS in the nipple duct, and the nipples were removed.  Resection margins were clear.   .  Briefly, she is a 48-year-old  female who underwent bilateral mastectomies for a 12 mm mixed lobular and infiltrating ductal carcinoma that was ER strongly positive, KS positive, and HER-2 negative.  Ki-67 was 30%.  Her Oncotype score was 17.    Review of Systems      Overall she feels OK.  She has tolerated the tamoxifen well.  She states that she has been experiencing some joint pains and occasional hot flashes which, according to her, are manageable.  She also reports mild intermittent back pain that has been present for a week.  She denies any depression, easy bruising, fevers, chills, night  sweats, weight loss, nausea, vomiting, diarrhea, constipation, diplopia, blurred vision, headache, chest pain, palpitations, shortness of breath, breast pain, abdominal pain, extremity pain, or difficulty ambulating.  The remainder of the ten-point ROS, including general, skin, lymph, H/N, cardiorespiratory, GI, , Neuro, Endocrine, and psychiatric is negative.     Objective:      Physical Exam      She is alert, oriented to time, place, person, pleasant, well      nourished, in no acute physical distress.                                VITAL SIGNS:  Reviewed                                      HEENT:  Normal.  There are no nasal, oral, lip, gingival, auricular, lid,    or conjunctival lesions.  Mucosae are moist and pink, and there is no        thrush.  Pupils are equal, reactive to light and accommodation.              Extraocular  muscle movements are intact.  Dentition is good.  There is no frontal or maxillary tenderness.                                     NECK:  Supple without JVD, adenopathy, or thyromegaly.                       LUNGS:  Clear to auscultation without wheezing, rales, or rhonchi.           CARDIOVASCULAR:  Reveals an S1, S2, no murmurs, no rubs, no gallops.         ABDOMEN:  Soft, nontender, without organomegaly.  Bowel sounds are    present.                                                                     EXTREMITIES:  No cyanosis, clubbing, or edema.                               BREASTS:  she is s/p bilateral mastectomies with prostheses in place.  Her incisions have healed nicely.                              LYMPHATIC:  There is no cervical, axillary, or supraclavicular adenopathy.   SKIN:  Warm and moist, without petechiae, rashes, induration, or ecchymoses.           NEUROLOGIC:  DTRs are 0-1+ bilaterally, symmetrical, motor function is 5/5,  and cranial nerves are  within normal limits.    Assessment:       1. Carcinoma of axillary tail of right breast in female, estrogen receptor positive    2. SERM use (selective estrogen receptor modulator)      2.    LCIS  Plan:        I had a long discussion with her. She will remain on tamoxifen, that she should take for either 5 years (followed by 5 years of AIs if she becomes postmenopausal) of 10 years of she is still pre or perimenopasual 5 years from now.  I will see her in 4 months   Her multiple questions were answered to her satisfaction.

## 2018-11-20 ENCOUNTER — DOCUMENTATION ONLY (OUTPATIENT)
Dept: REHABILITATION | Facility: HOSPITAL | Age: 48
End: 2018-11-20

## 2018-11-20 DIAGNOSIS — C50.811 MALIGNANT NEOPLASM OF OVERLAPPING SITES OF RIGHT BREAST IN FEMALE, ESTROGEN RECEPTOR POSITIVE: ICD-10-CM

## 2018-11-20 DIAGNOSIS — Z17.0 MALIGNANT NEOPLASM OF OVERLAPPING SITES OF RIGHT BREAST IN FEMALE, ESTROGEN RECEPTOR POSITIVE: ICD-10-CM

## 2018-11-20 DIAGNOSIS — C50.611 CARCINOMA OF AXILLARY TAIL OF RIGHT BREAST IN FEMALE, ESTROGEN RECEPTOR POSITIVE: ICD-10-CM

## 2018-11-20 DIAGNOSIS — Z17.0 CARCINOMA OF AXILLARY TAIL OF RIGHT BREAST IN FEMALE, ESTROGEN RECEPTOR POSITIVE: ICD-10-CM

## 2018-11-20 DIAGNOSIS — M25.612 DECREASED RANGE OF MOTION OF LEFT SHOULDER: ICD-10-CM

## 2018-11-20 PROBLEM — M25.619 DECREASED SHOULDER MOBILITY: Status: RESOLVED | Noted: 2018-09-19 | Resolved: 2018-11-20

## 2018-11-20 NOTE — PROGRESS NOTES
Discharge Note    Ms. Wheat was seen in outpatient physical therapy for an initial evaluation on 9/19/18  S/p bilateral mastectomies. Ms. Wheat attended 3 follow up visits and has self discharged as she has not returned for further physical therapy. Patient called and said she was doing well and would not be returning for further physical therapy. POC has ended and patient is discharged from physical therapy.        Pauline Bangura, PT

## 2019-02-02 DIAGNOSIS — C50.611 CARCINOMA OF AXILLARY TAIL OF RIGHT BREAST IN FEMALE, ESTROGEN RECEPTOR POSITIVE: ICD-10-CM

## 2019-02-02 DIAGNOSIS — Z17.0 CARCINOMA OF AXILLARY TAIL OF RIGHT BREAST IN FEMALE, ESTROGEN RECEPTOR POSITIVE: ICD-10-CM

## 2019-02-04 RX ORDER — TAMOXIFEN CITRATE 20 MG/1
TABLET ORAL
Qty: 90 TABLET | Refills: 0 | Status: SHIPPED | OUTPATIENT
Start: 2019-02-04 | End: 2019-05-11 | Stop reason: SDUPTHER

## 2019-03-26 PROBLEM — Z85.3 HISTORY OF BREAST CANCER IN FEMALE: Status: ACTIVE | Noted: 2019-03-26

## 2019-03-26 NOTE — PROGRESS NOTES
Subjective:       Patient ID: Roberta Wheat is a 49 y.o. female.    Chief Complaint: No chief complaint on file.    HPI     Mrs. Wheat returns today for follow up.  She has been on tamoxifen since April 2018 and has tolerated it well.   At the time of her mastectomies she had a 1.2 cm carcinoma with mixed ductal and lobular features, while two sentinel nodes were negative.  There was LCIS in the nipple duct, and the nipples were removed.  Resection margins were clear.   .  Briefly, she is a 49-year-old  female who underwent bilateral mastectomies for a 12 mm mixed lobular and infiltrating ductal carcinoma that was ER strongly positive, CO positive, and HER-2 negative.  Ki-67 was 30%.  Her Oncotype score was 17.    Her LMP was last month.    Review of Systems      Overall she feels OK.  She has tolerated the tamoxifen well, however, she mentions that her hot flashes have increased lately.  She denies any depression, easy bruising, fevers, chills, night  sweats, weight loss, nausea, vomiting, diarrhea, constipation, diplopia, blurred vision, headache, chest pain, palpitations, shortness of breath, breast pain, abdominal pain, extremity pain, or difficulty ambulating.  The remainder of the ten-point ROS, including general, skin, lymph, H/N, cardiorespiratory, GI, , Neuro, Endocrine, and psychiatric is negative.     Objective:      Physical Exam      She is alert, oriented to time, place, person, pleasant, well      nourished, in no acute physical distress.                                VITAL SIGNS:  Reviewed                                      HEENT:  Normal.  There are no nasal, oral, lip, gingival, auricular, lid,    or conjunctival lesions.  Mucosae are moist and pink, and there is no        thrush.  Pupils are equal, reactive to light and accommodation.              Extraocular muscle movements are intact.  Dentition is good.  There is no frontal or maxillary tenderness.                                      NECK:  Supple without JVD, adenopathy, or thyromegaly.                       LUNGS:  Clear to auscultation without wheezing, rales, or rhonchi.           CARDIOVASCULAR:  Reveals an S1, S2, no murmurs, no rubs, no gallops.         ABDOMEN:  Soft, nontender, without organomegaly.  Bowel sounds are    present.                                                                     EXTREMITIES:  No cyanosis, clubbing, or edema.                               BREASTS:  she is s/p bilateral mastectomies with prostheses in place.  Her incisions have healed nicely.                              LYMPHATIC:  There is no cervical, axillary, or supraclavicular adenopathy.   SKIN:  Warm and moist, without petechiae, rashes, induration, or ecchymoses.           NEUROLOGIC:  DTRs are 0-1+ bilaterally, symmetrical, motor function is 5/5,  and cranial nerves are  within normal limits.    Assessment:       1. SERM use (selective estrogen receptor modulator)    2. History of breast cancer in female      2.    LCIS  Plan:        I had a long discussion with her. She will remain on tamoxifen, that she should take for either 5 years (through April 2023) followed by 5 years of AIs if she becomes postmenopausal,  or 10 years of she is still pre or perimenopasual in April 2023.  I will see her in 4 months.   Will also initiate a referral to Dr. Alarcon for a routine colonoscopy since she does not have a PCP to initiate the referral.  Her multiple questions were answered to her satisfaction.

## 2019-03-27 ENCOUNTER — OFFICE VISIT (OUTPATIENT)
Dept: HEMATOLOGY/ONCOLOGY | Facility: CLINIC | Age: 49
End: 2019-03-27
Payer: COMMERCIAL

## 2019-03-27 ENCOUNTER — TELEPHONE (OUTPATIENT)
Dept: PLASTIC SURGERY | Facility: CLINIC | Age: 49
End: 2019-03-27

## 2019-03-27 VITALS
DIASTOLIC BLOOD PRESSURE: 66 MMHG | HEART RATE: 66 BPM | HEIGHT: 64 IN | TEMPERATURE: 98 F | RESPIRATION RATE: 18 BRPM | OXYGEN SATURATION: 100 % | WEIGHT: 117.5 LBS | BODY MASS INDEX: 20.06 KG/M2 | SYSTOLIC BLOOD PRESSURE: 109 MMHG

## 2019-03-27 DIAGNOSIS — Z85.3 HISTORY OF BREAST CANCER IN FEMALE: ICD-10-CM

## 2019-03-27 DIAGNOSIS — Z79.810 SERM USE (SELECTIVE ESTROGEN RECEPTOR MODULATOR): Primary | ICD-10-CM

## 2019-03-27 PROCEDURE — 3078F PR MOST RECENT DIASTOLIC BLOOD PRESSURE < 80 MM HG: ICD-10-PCS | Mod: CPTII,S$GLB,, | Performed by: INTERNAL MEDICINE

## 2019-03-27 PROCEDURE — 99213 OFFICE O/P EST LOW 20 MIN: CPT | Mod: S$GLB,,, | Performed by: INTERNAL MEDICINE

## 2019-03-27 PROCEDURE — 3074F SYST BP LT 130 MM HG: CPT | Mod: CPTII,S$GLB,, | Performed by: INTERNAL MEDICINE

## 2019-03-27 PROCEDURE — 99213 PR OFFICE/OUTPT VISIT, EST, LEVL III, 20-29 MIN: ICD-10-PCS | Mod: S$GLB,,, | Performed by: INTERNAL MEDICINE

## 2019-03-27 PROCEDURE — 99999 PR PBB SHADOW E&M-EST. PATIENT-LVL IV: CPT | Mod: PBBFAC,,, | Performed by: INTERNAL MEDICINE

## 2019-03-27 PROCEDURE — 3074F PR MOST RECENT SYSTOLIC BLOOD PRESSURE < 130 MM HG: ICD-10-PCS | Mod: CPTII,S$GLB,, | Performed by: INTERNAL MEDICINE

## 2019-03-27 PROCEDURE — 3078F DIAST BP <80 MM HG: CPT | Mod: CPTII,S$GLB,, | Performed by: INTERNAL MEDICINE

## 2019-03-27 PROCEDURE — 3008F PR BODY MASS INDEX (BMI) DOCUMENTED: ICD-10-PCS | Mod: CPTII,S$GLB,, | Performed by: INTERNAL MEDICINE

## 2019-03-27 PROCEDURE — 3008F BODY MASS INDEX DOCD: CPT | Mod: CPTII,S$GLB,, | Performed by: INTERNAL MEDICINE

## 2019-03-27 PROCEDURE — 99999 PR PBB SHADOW E&M-EST. PATIENT-LVL IV: ICD-10-PCS | Mod: PBBFAC,,, | Performed by: INTERNAL MEDICINE

## 2019-03-27 NOTE — TELEPHONE ENCOUNTER
LVM informing patient that we require all medical records prior to scheduling a consult for a 2nd opinion with Dr. Clayton. Fax and phone number provided for further questions.

## 2019-03-28 DIAGNOSIS — Z12.11 COLON CANCER SCREENING: Primary | ICD-10-CM

## 2019-04-01 ENCOUNTER — TELEPHONE (OUTPATIENT)
Dept: ENDOSCOPY | Facility: HOSPITAL | Age: 49
End: 2019-04-01

## 2019-04-01 DIAGNOSIS — Z12.11 SPECIAL SCREENING FOR MALIGNANT NEOPLASMS, COLON: Primary | ICD-10-CM

## 2019-04-01 RX ORDER — SODIUM, POTASSIUM,MAG SULFATES 17.5-3.13G
1 SOLUTION, RECONSTITUTED, ORAL ORAL ONCE
Qty: 1 BOTTLE | Refills: 0 | Status: SHIPPED | OUTPATIENT
Start: 2019-04-01 | End: 2019-04-01

## 2019-04-01 NOTE — TELEPHONE ENCOUNTER
----- Message from Jonah Alarcon MD sent at 3/28/2019  7:30 PM CDT -----  Done orders placed.    Thanks Jonah Sparks  ----- Message -----  From: Bridger Blanchard MD  Sent: 3/27/2019   4:03 PM  To: MD Jonah Barnes,  This is a patient of mine who just turned 49, and she will need a colonoscopy.  No urgency.  She does not have a PCP, and I told her I would reach out to you...  Thanks,    Bridger

## 2019-04-03 NOTE — PROGRESS NOTES
"REFERRAL FOR BREAST RECONSTRUCTION    CHIEF COMPLAINT  Breast implant asymmetry    Referring Provider: No ref. provider found  PCP: Mily Morris MD    HPI  Roberta Wheat is a 49 y.o. female presenting with previous 1.2 cm left breast cancer s/p bilateral skin-sparing mastectomy and reconstruction with implant-based reconstruction in 2018.  Her initial bilateral mastectomy and immediate breast reconstruction was performed 3/21/18 at [facility] with Jani Landry and Joni with skin sparing mastectomy via a vertical pattern and immediate direct to implant breast reconstruction with implants placed in the subpectoral location using lower pole 8x16 cm ADM (Dermacell) with  ml Allergan implants and concurrent de-innervation of the pec muscle.  She subsequently noted increasing asymmetry of the breasts with "hardening" likely consistent with capsular contracture and malposition of the implants of both breast.  She underwent revision with capsulectomy and caspulorraphy, implant downsize to 295 SRF implant bilaterally and concurrent fat grafting.  She has now noted that the right implant is significantly lower than the left and desires revision to correct right implant malposition.  She is satisfied with the appearance of the right. She is currently on Tamoxifen (2018-present) and received no other adjuvant therapies.      Her medical history is otherwise unremarkable with exception of migraine headaches for which she is on Imitrex.  Brain MRI for headache 2018 revealed no remarkable findings.  She has had previous  for triplets.  She is a non-smoker, BMI 20.49.    Oncologic Hx  Diagnosis: left breast IDC, 1.2 cm, ER/NM+ H2 neg, oncotype score 17, LCIS component, lymph nodes negative  Tumor stage:  Adjuvant therapy  - chemotherapy: none  - radiation therapy: none  - current adjuvant therapy: Tamoxifen  Oncologist: Jaylan    Previous Surgery:  Total mastectomy bilateral " w/ sentinel lymph node biopsy  Treating surgeon: Dinesh    Review of Op reports from [place]: scanned to chart    MRI: 1/2018  Right  Today's findings are returning for multicentric breast cancer involving the 12:00 position of the right breast and the lower inner quadrant of the right breast.  If the patient is interested in breast conservation, the non-Mass enhancement at 12:00 and centered at 4:00 in the right breast should be sampled with MRI.  If the patient is interested in mastectomy, nipple sparing may not be possible given the extension of the non-Mass enhancement at 12:00 into the nipple.     1.  Mass: Right breast 12 mm x 11 mm x 11 mm mass at the middle 12 o'clock position. Assessment: 6 - Known biopsy, proven malignancy.      2.  Non-mass Enhancement: Right breast non-mass enhancement at the 12 o'clock position. Assessment: 4 - Suspicious finding. Biopsy is recommended.      3. Non-mass Enhancement: Right breast 22 mm x 24 mm non-mass enhancement at the lower inner posterior position. Assessment: 4 - Suspicious finding. Biopsy is recommended.      Left  There is no MR evidence of malignancy.     BI-RADS Category:   Overall: 4 - Suspicious    PATH:  FINAL PATHOLOGIC DIAGNOSIS  BREAST (RIGHT 12:00, CLINICAL): INVASIVE DUCTAL CARCINOMA.  Note: Ribbon sections of the specimen were examined microscopically at deeper levels.  The invasive tumor appears moderately differentiated (3, 3, 1) and is represented in 2 biopsies with the largest  9mm. There are also areas of carcinoma in situ. Hormone receptor assay results will be issued as a supplemental  report.  ER: Positive (Intermediate, % of tumor cell nuclei).  AK: Positive (Strong, % of tumor cell nuclei).  HER2: Negative (1+).  Ki-67: 30% .    PMH  Patient Active Problem List   Diagnosis    Pre-operative respiratory examination    SERM use (selective estrogen receptor modulator)    History of breast cancer in female       PSH  Past  "Surgical History:   Procedure Laterality Date    BREAST BIOPSY       SECTION      triplets    TONSILLECTOMY         FH  Family History   Problem Relation Age of Onset    Breast cancer Maternal Grandmother         great granmother     Cancer Neg Hx     Colon cancer Neg Hx     Ovarian cancer Neg Hx        MEDICATIONS  No outpatient medications have been marked as taking for the 19 encounter (Appointment) with Jose G Clayton MD.       ALLERGIES  Review of patient's allergies indicates:   Allergen Reactions    Bactrim [sulfamethoxazole-trimethoprim] Hives       SOCIAL HISTORY  Tobacco:   Social History     Tobacco Use   Smoking Status Never Smoker   Smokeless Tobacco Never Used     EtOH:   Social History     Substance and Sexual Activity   Alcohol Use No       ROS  Review of Systems - General ROS: negative for - chills, fatigue, fever, hot flashes, malaise or night sweats  Psychological ROS: negative for - mood swings or sleep disturbances  Hematological and Lymphatic ROS: negative for - bleeding problems, blood clots, blood transfusions, bruising or fatigue  Endocrine ROS: negative for - hair pattern changes, hot flashes, malaise/lethargy, palpitations, polydipsia/polyuria or temperature intolerance  Breast ROS: positive for findings described above, negative for - nipple changes or nipple discharge  Respiratory ROS: no cough, shortness of breath, or wheezing  Cardiovascular ROS: no chest pain or dyspnea on exertion  Gastrointestinal ROS: no abdominal pain, change in bowel habits, or black or bloody stools  Genito-Urinary ROS: no dysuria, trouble voiding, or hematuria  Musculoskeletal ROS: negative for - gait disturbance, joint pain, joint stiffness, joint swelling or muscle pain  Neurological ROS: no TIA or stroke symptoms  Dermatological ROS: negative for acne, dry skin, eczema and hair changes    PHYSICAL EXAM  /60   Pulse 69   Ht 5' 3.5" (1.613 m)   Wt 52.6 kg (115 lb 15.4 oz)   BMI " 20.22 kg/m²     Constitutional: She is oriented to person, place, and time. She appears well-developed and well-nourished.   HENT: Normocephalic and atraumatic.   Neck: Normal range of motion. Neck supple. No JVD present.   Cardiovascular: Normal rate, regular rhythm and normal heart sounds.    Pulmonary/Chest: Effort normal. No respiratory distress.   Musculoskeletal: Normal range of motion. She exhibits no edema or deformity.   Neurological: She is alert and oriented to person, place, and time. No sensory deficit. She exhibits normal muscle tone.   Skin: Skin is warm. No rash noted. No erythema.   Psychiatric: She has a normal mood and affect. Her behavior is normal.     SN-IMF R: 20.5 cm  SN-IMF L: 19 cm    Right breast  SN-N: 19 cm  IMF-N: 6.5 cm  Base width: 10.5 cm  Height: 10.5-11 cm  Ptosis: grade 1  Baker grade 1 capsular contracture  Masses absent  Scars: Vertical and IMF; vertical component 8 cm  Adenopathy: axillary, supraclavicular absent  Mild animation deformity    Left breast  SN-N: 18 cm  IMF-N: 6.5 cm  Base width: 10.5 cm  Height: 10.5-11 cm  Ptosis: grade 1  Baker grade 1/2 capsular contracture  Masses absent  Scars: Vertical and IMF; vertical component 7 cm  Adenopathy: axillary, supraclavicular absent  Mild animation deformity    Back  - latissimus functional    Abdomen/Trunk/Thigh/Buttock  Abdomen: soft, nontender, nondistended    ASSESSMENT  Encounter Diagnoses   Name Primary?    Breast asymmetry following reconstructive surgery Yes    Malignant neoplasm of central portion of right breast in female, estrogen receptor positive     History of mastectomy, bilateral     History of reconstruction of both breasts      The patient presents with breast asymmetry primarily of the IMF location of her breasts; without pre-operative photos for review, I cannot determine the location of her natural IMF.  Since she likes the position, volume and appearance of the left breast, it would be reasonable to  offer revision to lift the right breast by approximately 1.5 cm.  This asymmetry may have resulted from a combination of previous capsular contracture on the left and/or inferolateral displacement of the right implant from pec animation as well as relaxation of the internal capsule.  Approaches to lift the right side would include revision with internal capsulorraphy and possible additional ADM to secure the location of the IMF.  Superior capsulotomy would likely be necessary.  Additional fat grafting or revision to the left implant can be reserved as an option to optimize symmetry by most likely not necessary.      I advised that since she is < 6 months out from her last revision, the risk of further asymmetry is high and the possibility of future implant malposition is still present.    ---------------------------------    PLAN  - Procedure advised: Right breast reconstruction revision with capsulotomy, capsulorraphy, and ADM placement, possible implant exchange, possible fat grafting, possible revision of left breast reconstruction  - Would advise waiting at least 9 months since first revision  - Will go forward with booking as she is currently 6 months out    This encounter length was 45 minutes for  Encounter Diagnoses   Name Primary?    Breast asymmetry following reconstructive surgery Yes    Malignant neoplasm of central portion of right breast in female, estrogen receptor positive     History of mastectomy, bilateral     History of reconstruction of both breasts        Over 50% of the encounter length was spent in face to face counseling about the relevant issues pertaining to the diagnoses, management choices, and prognosis.    Electronically signed by:  Jose G Clayton  4/3/2019  8:43 AM

## 2019-04-04 ENCOUNTER — SURGICAL CONSULT (OUTPATIENT)
Dept: PLASTIC SURGERY | Facility: CLINIC | Age: 49
End: 2019-04-04
Payer: COMMERCIAL

## 2019-04-04 VITALS
BODY MASS INDEX: 19.79 KG/M2 | WEIGHT: 115.94 LBS | SYSTOLIC BLOOD PRESSURE: 116 MMHG | DIASTOLIC BLOOD PRESSURE: 60 MMHG | HEIGHT: 64 IN | HEART RATE: 69 BPM

## 2019-04-04 DIAGNOSIS — Z17.0 MALIGNANT NEOPLASM OF CENTRAL PORTION OF RIGHT BREAST IN FEMALE, ESTROGEN RECEPTOR POSITIVE: ICD-10-CM

## 2019-04-04 DIAGNOSIS — C50.111 MALIGNANT NEOPLASM OF CENTRAL PORTION OF RIGHT BREAST IN FEMALE, ESTROGEN RECEPTOR POSITIVE: ICD-10-CM

## 2019-04-04 DIAGNOSIS — N65.1 BREAST ASYMMETRY FOLLOWING RECONSTRUCTIVE SURGERY: Primary | ICD-10-CM

## 2019-04-04 DIAGNOSIS — Z98.890 HISTORY OF RECONSTRUCTION OF BOTH BREASTS: ICD-10-CM

## 2019-04-04 DIAGNOSIS — Z90.13 HISTORY OF MASTECTOMY, BILATERAL: ICD-10-CM

## 2019-04-04 PROBLEM — C50.911 MALIGNANT NEOPLASM OF RIGHT BREAST IN FEMALE, ESTROGEN RECEPTOR POSITIVE: Status: ACTIVE | Noted: 2019-04-04

## 2019-04-04 PROCEDURE — 3008F BODY MASS INDEX DOCD: CPT | Mod: CPTII,S$GLB,, | Performed by: PLASTIC SURGERY

## 2019-04-04 PROCEDURE — 3078F DIAST BP <80 MM HG: CPT | Mod: CPTII,S$GLB,, | Performed by: PLASTIC SURGERY

## 2019-04-04 PROCEDURE — 99204 PR OFFICE/OUTPT VISIT, NEW, LEVL IV, 45-59 MIN: ICD-10-PCS | Mod: S$GLB,,, | Performed by: PLASTIC SURGERY

## 2019-04-04 PROCEDURE — 3008F PR BODY MASS INDEX (BMI) DOCUMENTED: ICD-10-PCS | Mod: CPTII,S$GLB,, | Performed by: PLASTIC SURGERY

## 2019-04-04 PROCEDURE — 3074F SYST BP LT 130 MM HG: CPT | Mod: CPTII,S$GLB,, | Performed by: PLASTIC SURGERY

## 2019-04-04 PROCEDURE — 3074F PR MOST RECENT SYSTOLIC BLOOD PRESSURE < 130 MM HG: ICD-10-PCS | Mod: CPTII,S$GLB,, | Performed by: PLASTIC SURGERY

## 2019-04-04 PROCEDURE — 3078F PR MOST RECENT DIASTOLIC BLOOD PRESSURE < 80 MM HG: ICD-10-PCS | Mod: CPTII,S$GLB,, | Performed by: PLASTIC SURGERY

## 2019-04-04 PROCEDURE — 99204 OFFICE O/P NEW MOD 45 MIN: CPT | Mod: S$GLB,,, | Performed by: PLASTIC SURGERY

## 2019-05-03 ENCOUNTER — ANESTHESIA EVENT (OUTPATIENT)
Dept: ENDOSCOPY | Facility: HOSPITAL | Age: 49
End: 2019-05-03
Payer: COMMERCIAL

## 2019-05-03 ENCOUNTER — HOSPITAL ENCOUNTER (OUTPATIENT)
Facility: HOSPITAL | Age: 49
Discharge: HOME | End: 2019-05-03
Attending: INTERNAL MEDICINE | Admitting: INTERNAL MEDICINE
Payer: COMMERCIAL

## 2019-05-03 ENCOUNTER — ANESTHESIA (OUTPATIENT)
Dept: ENDOSCOPY | Facility: HOSPITAL | Age: 49
End: 2019-05-03
Payer: COMMERCIAL

## 2019-05-03 VITALS
WEIGHT: 113 LBS | HEIGHT: 63 IN | OXYGEN SATURATION: 98 % | HEART RATE: 61 BPM | DIASTOLIC BLOOD PRESSURE: 68 MMHG | RESPIRATION RATE: 18 BRPM | BODY MASS INDEX: 20.02 KG/M2 | TEMPERATURE: 98 F | SYSTOLIC BLOOD PRESSURE: 128 MMHG

## 2019-05-03 DIAGNOSIS — Z12.11 COLON CANCER SCREENING: ICD-10-CM

## 2019-05-03 LAB
B-HCG UR QL: NEGATIVE
CTP QC/QA: YES

## 2019-05-03 PROCEDURE — 88305 TISSUE SPECIMEN TO PATHOLOGY - SURGERY: ICD-10-PCS | Mod: 26,,, | Performed by: PATHOLOGY

## 2019-05-03 PROCEDURE — 27201089 HC SNARE, DISP (ANY): Performed by: INTERNAL MEDICINE

## 2019-05-03 PROCEDURE — E9220 PRA ENDO ANESTHESIA: HCPCS | Mod: 33,,, | Performed by: NURSE ANESTHETIST, CERTIFIED REGISTERED

## 2019-05-03 PROCEDURE — 37000008 HC ANESTHESIA 1ST 15 MINUTES: Performed by: INTERNAL MEDICINE

## 2019-05-03 PROCEDURE — 25000003 PHARM REV CODE 250: Performed by: NURSE ANESTHETIST, CERTIFIED REGISTERED

## 2019-05-03 PROCEDURE — 63600175 PHARM REV CODE 636 W HCPCS: Performed by: NURSE ANESTHETIST, CERTIFIED REGISTERED

## 2019-05-03 PROCEDURE — 37000009 HC ANESTHESIA EA ADD 15 MINS: Performed by: INTERNAL MEDICINE

## 2019-05-03 PROCEDURE — 25000003 PHARM REV CODE 250: Performed by: INTERNAL MEDICINE

## 2019-05-03 PROCEDURE — 88305 TISSUE EXAM BY PATHOLOGIST: CPT | Mod: 26,,, | Performed by: PATHOLOGY

## 2019-05-03 PROCEDURE — 81025 URINE PREGNANCY TEST: CPT | Performed by: INTERNAL MEDICINE

## 2019-05-03 PROCEDURE — E9220 PRA ENDO ANESTHESIA: ICD-10-PCS | Mod: 33,,, | Performed by: NURSE ANESTHETIST, CERTIFIED REGISTERED

## 2019-05-03 PROCEDURE — 45385 COLONOSCOPY W/LESION REMOVAL: CPT | Mod: 33,,, | Performed by: INTERNAL MEDICINE

## 2019-05-03 PROCEDURE — 45385 PR COLONOSCOPY,REMV LESN,SNARE: ICD-10-PCS | Mod: 33,,, | Performed by: INTERNAL MEDICINE

## 2019-05-03 PROCEDURE — 88305 TISSUE EXAM BY PATHOLOGIST: CPT | Performed by: PATHOLOGY

## 2019-05-03 PROCEDURE — 45385 COLONOSCOPY W/LESION REMOVAL: CPT | Performed by: INTERNAL MEDICINE

## 2019-05-03 RX ORDER — ONDANSETRON 2 MG/ML
INJECTION INTRAMUSCULAR; INTRAVENOUS
Status: DISCONTINUED | OUTPATIENT
Start: 2019-05-03 | End: 2019-05-03

## 2019-05-03 RX ORDER — PROPOFOL 10 MG/ML
VIAL (ML) INTRAVENOUS CONTINUOUS PRN
Status: DISCONTINUED | OUTPATIENT
Start: 2019-05-03 | End: 2019-05-03

## 2019-05-03 RX ORDER — GLYCOPYRROLATE 0.2 MG/ML
INJECTION INTRAMUSCULAR; INTRAVENOUS
Status: DISCONTINUED | OUTPATIENT
Start: 2019-05-03 | End: 2019-05-03

## 2019-05-03 RX ORDER — SODIUM CHLORIDE 9 MG/ML
INJECTION, SOLUTION INTRAVENOUS CONTINUOUS
Status: DISCONTINUED | OUTPATIENT
Start: 2019-05-03 | End: 2019-05-03 | Stop reason: HOSPADM

## 2019-05-03 RX ORDER — SODIUM CHLORIDE 0.9 % (FLUSH) 0.9 %
10 SYRINGE (ML) INJECTION
Status: DISCONTINUED | OUTPATIENT
Start: 2019-05-03 | End: 2019-05-03 | Stop reason: HOSPADM

## 2019-05-03 RX ORDER — LIDOCAINE HCL/PF 100 MG/5ML
SYRINGE (ML) INTRAVENOUS
Status: DISCONTINUED | OUTPATIENT
Start: 2019-05-03 | End: 2019-05-03

## 2019-05-03 RX ORDER — PROPOFOL 10 MG/ML
VIAL (ML) INTRAVENOUS
Status: DISCONTINUED | OUTPATIENT
Start: 2019-05-03 | End: 2019-05-03

## 2019-05-03 RX ADMIN — LIDOCAINE HYDROCHLORIDE 50 MG: 20 INJECTION, SOLUTION INTRAVENOUS at 01:05

## 2019-05-03 RX ADMIN — PROPOFOL 200 MCG/KG/MIN: 10 INJECTION, EMULSION INTRAVENOUS at 01:05

## 2019-05-03 RX ADMIN — ONDANSETRON 4 MG: 2 INJECTION INTRAMUSCULAR; INTRAVENOUS at 01:05

## 2019-05-03 RX ADMIN — SODIUM CHLORIDE: 0.9 INJECTION, SOLUTION INTRAVENOUS at 12:05

## 2019-05-03 RX ADMIN — GLYCOPYRROLATE 0.1 MG: 0.2 INJECTION, SOLUTION INTRAMUSCULAR; INTRAVENOUS at 01:05

## 2019-05-03 RX ADMIN — PROPOFOL 100 MG: 10 INJECTION, EMULSION INTRAVENOUS at 01:05

## 2019-05-03 NOTE — [employer] PATIENT INSTRUCTIONS
Discharge Summary/Instructions after an Endoscopic Procedure  Patient Name: Roberta Wheat  Patient MRN: [id number]  Patient   YOB: 1970  Friday, May 03, 2019  Jonah Alarcon MD  RESTRICTIONS:  During your procedure today, you received medications for sedation.  These   medications may affect your judgment, balance and coordination.  Therefore,   for 24 hours, you have the following restrictions:   - DO NOT drive a car, operate machinery, make legal/financial decisions,   sign important papers or drink alcohol.    ACTIVITY:  Today: no heavy lifting, straining or running due to procedural   sedation/anesthesia.  The following day: return to full activity including work.  DIET:  Eat and drink normally unless instructed otherwise.     TREATMENT FOR COMMON SIDE EFFECTS:  - Mild abdominal pain, nausea, belching, bloating or excessive gas:  rest,   eat lightly and use a heating pad.  - Sore Throat: treat with throat lozenges and/or gargle with warm salt   water.  - Because air was used during the procedure, expelling large amounts of air   from your rectum or belching is normal.  - If a bowel prep was taken, you may not have a bowel movement for 1-3 days.    This is normal.  SYMPTOMS TO WATCH FOR AND REPORT TO YOUR PHYSICIAN:  1. Abdominal pain or bloating, other than gas cramps.  2. Chest pain.  3. Back pain.  4. Signs of infection such as: chills or fever occurring within 24 hours   after the procedure.  5. Rectal bleeding, which would show as bright red, maroon, or black stools.   (A tablespoon of blood from the rectum is not serious, especially if   hemorrhoids are present.)  6. Vomiting.  7. Weakness or dizziness.  GO DIRECTLY TO THE NEAREST EMERGENCY ROOM IF YOU HAVE ANY OF THE FOLLOWING:      Difficulty breathing              Chills and/or fever over 101 F   Persistent vomiting and/or vomiting blood   Severe abdominal pain   Severe chest pain   Black, tarry stools   Bleeding- more than one  tablespoon   Any other symptom or condition that you feel may need urgent attention  Your doctor recommends these additional instructions:  If any biopsies were taken, your doctors clinic will contact you in 1 to 2   weeks with any results.  - Discharge patient to home.   - Await pathology results.   - Telephone endoscopist for pathology results in 2 weeks.   - Repeat colonoscopy in 3 - 5 years for surveillance based on pathology   results.   - The findings and recommendations were discussed with the patient.   - Return to referring physician.  For questions, problems or results please call your physician - Jonah Alarcon MD at Work:  [phone].  [facility], EMERGENCY ROOM PHONE NUMBER: [phone]  IF A COMPLICATION OR EMERGENCY SITUATION ARISES AND YOU ARE UNABLE TO REACH   YOUR PHYSICIAN - GO DIRECTLY TO THE EMERGENCY ROOM.  Jonah Alarcon MD  5/3/2019 1:47:38 PM  This report has been verified and signed electronically.  [employer]

## 2019-05-03 NOTE — DISCHARGE INSTRUCTIONS
Colonoscopy     A camera attached to a flexible tube with a viewing lens is used to take video pictures.     Colonoscopy is a test to view the inside of your lower digestive tract (colon and rectum). Sometimes it can show the last part of the small intestine (ileum). During the test, small pieces of tissue may be removed for testing. This is called a biopsy. Small growths, such as polyps, may also be removed.   Why is colonoscopy done?  The test is done to help look for colon cancer. And it can help find the source of abdominal pain, bleeding, and changes in bowel habits. It may be needed once a year, depending on factors such as your:  · Age  · Health history  · Family health history  · Symptoms  · Results from any prior colonoscopy  Risks and possible complications  These include:  · Bleeding               · A puncture or tear in the colon   · Risks of anesthesia  · A cancer lesion not being seen  Getting ready   To prepare for the test:  · Talk with your healthcare provider about the risks of the test (see below). Also ask your healthcare provider about alternatives to the test.  · Tell your healthcare provider about any medicines you take. Also tell him or her about any health conditions you may have.  · Make sure your rectum and colon are empty for the test. Follow the diet and bowel prep instructions exactly. If you dont, the test may need to be rescheduled.  · Plan for a friend or family member to drive you home after the test.     Colonoscopy provides an inside view of the entire colon.     You may discuss the results with your doctor right away or at a future visit.  During the test   The test is usually done in the hospital on an outpatient basis. This means you go home the same day. The procedure takes about 30 minutes. During that time:  · You are given relaxing (sedating) medicine through an IV line. You may be drowsy, or fully asleep.  · The healthcare provider will first give you a physical exam to  check for anal and rectal problems.  · Then the anus is lubricated and the scope inserted.  · If you are awake, you may have a feeling similar to needing to have a bowel movement. You may also feel pressure as air is pumped into the colon. Its OK to pass gas during the procedure.  · Biopsy, polyp removal, or other treatments may be done during the test.  After the test   You may have gas right after the test. It can help to try to pass it to help prevent later bloating. Your healthcare provider may discuss the results with you right away. Or you may need to schedule a follow-up visit to talk about the results. After the test, you can go back to your normal eating and other activities. You may be tired from the sedation and need to rest for a few hours.  Date Last Reviewed: 11/1/2016 © 2000-2017 [employer]. [address] [place] [address], [place] [address]. All rights reserved. This information is not intended as a substitute for professional medical care. Always follow your healthcare professional's instructions.

## 2019-05-03 NOTE — TRANSFER OF CARE
"Anesthesia Transfer of Care Note    Patient: Roberta Wheat    Procedure(s) Performed: Procedure(s) (LRB):  COLONOSCOPY (N/A)    Patient location: PACU    Anesthesia Type: general    Transport from OR: Transported from OR on room air with adequate spontaneous ventilation    Post pain: adequate analgesia    Post assessment: no apparent anesthetic complications and tolerated procedure well    Post vital signs: stable    Level of consciousness: sedated    Nausea/Vomiting: no nausea/vomiting    Complications: none    Transfer of care protocol was followed      Last vitals:   Visit Vitals  /71 (BP Location: Left arm, Patient Position: Lying)   Pulse 75   Temp 37 °C (98.6 °F) (Temporal)   Resp 18   Ht 5' 3" (1.6 m)   Wt 51.3 kg (113 lb)   SpO2 99%   Breastfeeding? No   BMI 20.02 kg/m²     "

## 2019-05-03 NOTE — ANESTHESIA PREPROCEDURE EVALUATION
2019  Roberta Wheat is a 49 y.o., female.  Past Medical History:   Diagnosis Date    Migraine      Past Surgical History:   Procedure Laterality Date    BREAST BIOPSY       SECTION      triplets    TONSILLECTOMY           Anesthesia Evaluation    I have reviewed the Patient Summary Reports.     I have reviewed the Medications.     Review of Systems  Anesthesia Hx:  No problems with previous Anesthesia  Neg history of prior surgery. Denies Family Hx of Anesthesia complications.   Denies Personal Hx of Anesthesia complications.   Hematology/Oncology:  Hematology Normal   Oncology Normal     EENT/Dental:EENT/Dental Normal   Cardiovascular:  Cardiovascular Normal Exercise tolerance: good     Pulmonary:  Pulmonary Normal    Renal/:  Renal/ Normal     Hepatic/GI:  Hepatic/GI Normal    Musculoskeletal:  Musculoskeletal Normal    Neurological:   Headaches    Endocrine:  Endocrine Normal    Dermatological:  Skin Normal    Psych:  Psychiatric Normal           Physical Exam  General:  Well nourished    Airway/Jaw/Neck:       Eyes/Ears/Nose:  EYES/EARS/NOSE FINDINGS: Normal    Chest/Lungs:  Chest/Lungs Clear    Heart/Vascular:  Heart Findings: Normal Heart murmur: negative Vascular Findings: Normal    Abdomen:  Abdomen Findings: Normal    Musculoskeletal:  Musculoskeletal Findings: Normal   Skin:  Skin Findings: Normal    Mental Status:  Mental Status Findings:  Cooperative, Alert and Oriented         Anesthesia Plan  Type of Anesthesia, risks & benefits discussed:  Anesthesia Type:  general  Patient's Preference: general  Intra-op Monitoring Plan: standard ASA monitors  Intra-op Monitoring Plan Comments:   Post Op Pain Control Plan: multimodal analgesia  Post Op Pain Control Plan Comments:   Induction:   IV  Beta Blocker:  Patient is not currently on a Beta-Blocker (No further documentation  required).       Informed Consent: Patient understands risks and agrees with Anesthesia plan.  Questions answered. Anesthesia consent signed with patient.  ASA Score: 1     Day of Surgery Review of History & Physical: I have interviewed and examined the patient. I have reviewed the patient's H&P dated:  There are no significant changes.  H&P update referred to the surgeon.         Ready For Surgery From Anesthesia Perspective.

## 2019-05-03 NOTE — H&P
[facility]  History & Physical    Subjective:      Chief Complaint/Reason for Admission:    Colonoscopy    Roberta Wheat is a 49 y.o. female.    Past Medical History:   Diagnosis Date    Migraine      Past Surgical History:   Procedure Laterality Date    BREAST BIOPSY       SECTION      triplets    TONSILLECTOMY       Family History   Problem Relation Age of Onset    Breast cancer Maternal Grandmother         great granmother     Cancer Neg Hx     Colon cancer Neg Hx     Ovarian cancer Neg Hx      Social History     Tobacco Use    Smoking status: Never Smoker    Smokeless tobacco: Never Used   Substance Use Topics    Alcohol use: Yes     Alcohol/week: 1.2 - 1.8 oz     Types: 2 - 3 Glasses of wine per week    Drug use: No       PTA Medications   Medication Sig    spironolactone (ALDACTONE) 100 MG tablet Take 100 mg by mouth once daily.     sumatriptan (IMITREX) 100 MG tablet Take 100 mg by mouth once.     tamoxifen (NOLVADEX) 20 MG Tab TAKE 1 TABLET BY MOUTH ONCE DAILY     Review of patient's allergies indicates:   Allergen Reactions    Bactrim [sulfamethoxazole-trimethoprim] Hives        Review of Systems   Constitutional: Negative for chills, fever and weight loss.   Respiratory: Negative for shortness of breath.    Cardiovascular: Negative for chest pain.   Gastrointestinal: Negative for abdominal pain.       Objective:      Vital Signs (Most Recent)  Temp: 98.6 °F (37 °C) (19 1251)  Pulse: 75 (19 1251)  Resp: 18 (19 1251)  BP: 117/71 (19 1251)  SpO2: 99 % (19 1251)    Vital Signs Range (Last 24H):  Temp:  [98.6 °F (37 °C)]   Pulse:  [75]   Resp:  [18]   BP: (117)/(71)   SpO2:  [99 %]     Physical Exam   Constitutional: She appears well-developed and well-nourished.   Eyes: Pupils are equal, round, and reactive to light.   Cardiovascular: Normal rate.   Pulmonary/Chest: Effort normal.   Abdominal: Soft.   Skin: Skin is warm and dry.    Psychiatric: She has a normal mood and affect. Her behavior is normal. Judgment and thought content normal.           Assessment:      Active Hospital Problems    Diagnosis  POA    Colon cancer screening [Z12.11]  Not Applicable      Resolved Hospital Problems   No resolved problems to display.       Plan:    Direct access screening colonoscopy average risk for CRC by history today.

## 2019-05-07 NOTE — ANESTHESIA POSTPROCEDURE EVALUATION
Anesthesia Post Evaluation    Patient: Roberta Wheat    Procedure(s) Performed: Procedure(s) (LRB):  COLONOSCOPY (N/A)    Final Anesthesia Type: general  Patient location during evaluation: PACU  Patient participation: Yes- Able to Participate  Level of consciousness: awake and alert and oriented  Pain management: adequate  Airway patency: patent  PONV status at discharge: No PONV  Anesthetic complications: no      Cardiovascular status: blood pressure returned to baseline and hemodynamically stable  Respiratory status: unassisted  Hydration status: euvolemic  Follow-up not needed.          Vitals Value Taken Time   /68 5/3/2019  2:20 PM   Temp 36.7 °C (98.1 °F) 5/3/2019  1:50 PM   Pulse 61 5/3/2019  2:20 PM   Resp 18 5/3/2019  2:20 PM   SpO2 98 % 5/3/2019  2:20 PM         Event Time     Out of Recovery 14:27:08          Pain/Danika Score: No data recorded

## 2019-05-10 ENCOUNTER — TELEPHONE (OUTPATIENT)
Dept: ENDOSCOPY | Facility: HOSPITAL | Age: 49
End: 2019-05-10

## 2019-05-11 DIAGNOSIS — C50.611 CARCINOMA OF AXILLARY TAIL OF RIGHT BREAST IN FEMALE, ESTROGEN RECEPTOR POSITIVE: ICD-10-CM

## 2019-05-11 DIAGNOSIS — Z17.0 CARCINOMA OF AXILLARY TAIL OF RIGHT BREAST IN FEMALE, ESTROGEN RECEPTOR POSITIVE: ICD-10-CM

## 2019-05-13 RX ORDER — TAMOXIFEN CITRATE 20 MG/1
TABLET ORAL
Qty: 90 TABLET | Refills: 0 | Status: SHIPPED | OUTPATIENT
Start: 2019-05-13 | End: 2019-09-16 | Stop reason: SDUPTHER

## 2019-07-17 ENCOUNTER — PATIENT MESSAGE (OUTPATIENT)
Dept: HEMATOLOGY/ONCOLOGY | Facility: CLINIC | Age: 49
End: 2019-07-17

## 2019-07-22 NOTE — PROGRESS NOTES
Subjective:       Patient ID: Roberta Wheat is a 49 y.o. female.    Chief Complaint: Carcinoma of axillary tail of right breast in female, estrog    HPI     Mrs. Wheat returns today for follow up.  She has been on tamoxifen since April 2018.   Today,  Feels good.m   Recent injections to her elbows.   Occasional hot flashes.   No pain issues.   No other complaints.      Oncology History:  Briefly, she is a 49-year-old  female who underwent bilateral mastectomies for a 12 mm mixed lobular and infiltrating ductal carcinoma that was ER strongly positive, OH positive, and HER-2 negative.  Ki-67 was 30%.  Her Oncotype score was 17.  At the time of her mastectomies she had a 1.2 cm carcinoma with mixed ductal and lobular features, while two sentinel nodes were negative.  There was LCIS in the nipple duct, and the nipples were removed.  Resection margins were clear.    Her LMP was last week.     Review of Systems   Constitutional: Negative for activity change, appetite change, fatigue, fever and unexpected weight change.        Hot flashes.    HENT: Negative for mouth sores, nosebleeds and sore throat.    Eyes: Negative for visual disturbance.   Respiratory: Negative for cough and shortness of breath.    Cardiovascular: Negative for chest pain, palpitations and leg swelling.   Gastrointestinal: Negative for abdominal pain, constipation, diarrhea, nausea and vomiting.   Genitourinary: Negative for difficulty urinating and frequency.   Musculoskeletal: Negative for arthralgias and back pain.   Skin: Negative for rash.   Neurological: Negative for dizziness, numbness and headaches.   Hematological: Negative for adenopathy. Does not bruise/bleed easily.   Psychiatric/Behavioral: Negative for confusion and sleep disturbance. The patient is not nervous/anxious.    All other systems reviewed and are negative.          Objective:      Physical Exam   Constitutional: She is oriented to person, place, and time. She  appears well-developed and well-nourished. No distress.   HENT:   Head: Normocephalic.   Right Ear: External ear normal.   Left Ear: External ear normal.   Mouth/Throat: No oropharyngeal exudate.   No sinus tenderness.   Eyes: Pupils are equal, round, and reactive to light. Conjunctivae and lids are normal. No scleral icterus.   Neck: Trachea normal and normal range of motion. Neck supple. No thyromegaly present.   Cardiovascular: Normal rate, regular rhythm and normal heart sounds.   Pulmonary/Chest: Effort normal and breath sounds normal.   BREASTS:  she is s/p bilateral mastectomies with prostheses in place.  Her incisions have healed nicely.       Abdominal: Soft. Normal appearance and bowel sounds are normal. She exhibits no distension and no mass. There is no tenderness.   Musculoskeletal: Normal range of motion.   Lymphadenopathy:        Head (right side): No submental and no submandibular adenopathy present.        Head (left side): No submental and no submandibular adenopathy present.     She has no cervical adenopathy.   Neurological: She is alert and oriented to person, place, and time. She has normal strength and normal reflexes. No cranial nerve deficit. Gait normal.   Skin: Skin is warm, dry and intact. No bruising and no rash noted. No cyanosis. Nails show no clubbing.   Psychiatric: She has a normal mood and affect. Her speech is normal and behavior is normal.   Nursing note and vitals reviewed.        Assessment:       1. Malignant neoplasm of right breast in female, estrogen receptor positive, unspecified site of breast    2. SERM use (selective estrogen receptor modulator)    3. History of mastectomy, bilateral       Plan:           -Doing well, ANDREWS clinically.   -Continue Tamoxifen. She will remain on tamoxifen, that she should take for either 5 years (through April 2023) followed by 5 years of AIs if she becomes postmenopausal, or 10 years of she is still pre or perimenopasual in April 2023.    -RTC in 4 months to see Dr. Tian.    -Labs today-cbc, cmp, vit d, tsh, t4, lipid panel (she is fasting).  -Encouraged to make follow up with Dr. Morris for yearly. Discussed risks of tamoxifen.   -Encouraged to get established with PCP for other health maintenance.     Patient is in agreement with the proposed treatment plan. All questions were answered to the patient's satisfaction. Pt knows to call clinic for any new or worsening symptoms and if anything is needed before the next clinic visit.      CRAIG HendricksonP-C  Hematology & Oncology  [address] [place] [id number]  ph. [phone]  Fax. [phone]     I spent 30 minutes (face to face) with the patient, more than 50% was in counseling and coordination of care as detailed above.         Distress Screening Results: Psychosocial Distress screening score of Distress Score: 0 noted and reviewed. No intervention indicated.

## 2019-07-23 ENCOUNTER — LAB VISIT (OUTPATIENT)
Dept: LAB | Facility: HOSPITAL | Age: 49
End: 2019-07-23
Attending: NURSE PRACTITIONER
Payer: COMMERCIAL

## 2019-07-23 ENCOUNTER — OFFICE VISIT (OUTPATIENT)
Dept: HEMATOLOGY/ONCOLOGY | Facility: CLINIC | Age: 49
End: 2019-07-23
Payer: COMMERCIAL

## 2019-07-23 VITALS
SYSTOLIC BLOOD PRESSURE: 105 MMHG | HEART RATE: 69 BPM | HEIGHT: 64 IN | BODY MASS INDEX: 20.32 KG/M2 | WEIGHT: 119.06 LBS | TEMPERATURE: 98 F | DIASTOLIC BLOOD PRESSURE: 60 MMHG | RESPIRATION RATE: 18 BRPM | OXYGEN SATURATION: 98 %

## 2019-07-23 DIAGNOSIS — Z90.13 HISTORY OF MASTECTOMY, BILATERAL: ICD-10-CM

## 2019-07-23 DIAGNOSIS — C50.911 MALIGNANT NEOPLASM OF RIGHT BREAST IN FEMALE, ESTROGEN RECEPTOR POSITIVE, UNSPECIFIED SITE OF BREAST: ICD-10-CM

## 2019-07-23 DIAGNOSIS — Z79.810 SERM USE (SELECTIVE ESTROGEN RECEPTOR MODULATOR): ICD-10-CM

## 2019-07-23 DIAGNOSIS — Z17.0 MALIGNANT NEOPLASM OF RIGHT BREAST IN FEMALE, ESTROGEN RECEPTOR POSITIVE, UNSPECIFIED SITE OF BREAST: Primary | ICD-10-CM

## 2019-07-23 DIAGNOSIS — C50.911 MALIGNANT NEOPLASM OF RIGHT BREAST IN FEMALE, ESTROGEN RECEPTOR POSITIVE, UNSPECIFIED SITE OF BREAST: Primary | ICD-10-CM

## 2019-07-23 DIAGNOSIS — Z17.0 MALIGNANT NEOPLASM OF RIGHT BREAST IN FEMALE, ESTROGEN RECEPTOR POSITIVE, UNSPECIFIED SITE OF BREAST: ICD-10-CM

## 2019-07-23 LAB
25(OH)D3+25(OH)D2 SERPL-MCNC: 46 NG/ML (ref 30–96)
ALBUMIN SERPL BCP-MCNC: 4.3 G/DL (ref 3.5–5.2)
ALP SERPL-CCNC: 47 U/L (ref 55–135)
ALT SERPL W/O P-5'-P-CCNC: 14 U/L (ref 10–44)
ANION GAP SERPL CALC-SCNC: 7 MMOL/L (ref 8–16)
AST SERPL-CCNC: 17 U/L (ref 10–40)
BILIRUB SERPL-MCNC: 0.5 MG/DL (ref 0.1–1)
BUN SERPL-MCNC: 15 MG/DL (ref 6–20)
CALCIUM SERPL-MCNC: 9.9 MG/DL (ref 8.7–10.5)
CHLORIDE SERPL-SCNC: 104 MMOL/L (ref 95–110)
CHOLEST SERPL-MCNC: 187 MG/DL (ref 120–199)
CHOLEST/HDLC SERPL: 2.4 {RATIO} (ref 2–5)
CO2 SERPL-SCNC: 29 MMOL/L (ref 23–29)
CREAT SERPL-MCNC: 0.8 MG/DL (ref 0.5–1.4)
ERYTHROCYTE [DISTWIDTH] IN BLOOD BY AUTOMATED COUNT: 13.3 % (ref 11.5–14.5)
EST. GFR  (AFRICAN AMERICAN): >60 ML/MIN/1.73 M^2
EST. GFR  (NON AFRICAN AMERICAN): >60 ML/MIN/1.73 M^2
GLUCOSE SERPL-MCNC: 95 MG/DL (ref 70–110)
HCT VFR BLD AUTO: 45 % (ref 37–48.5)
HDLC SERPL-MCNC: 79 MG/DL (ref 40–75)
HDLC SERPL: 42.2 % (ref 20–50)
HGB BLD-MCNC: 14.3 G/DL (ref 12–16)
IMM GRANULOCYTES # BLD AUTO: 0.01 K/UL (ref 0–0.04)
LDLC SERPL CALC-MCNC: 97.4 MG/DL (ref 63–159)
MCH RBC QN AUTO: 31.2 PG (ref 27–31)
MCHC RBC AUTO-ENTMCNC: 31.8 G/DL (ref 32–36)
MCV RBC AUTO: 98 FL (ref 82–98)
NEUTROPHILS # BLD AUTO: 2.6 K/UL (ref 1.8–7.7)
NONHDLC SERPL-MCNC: 108 MG/DL
PLATELET # BLD AUTO: 213 K/UL (ref 150–350)
PMV BLD AUTO: 11.5 FL (ref 9.2–12.9)
POTASSIUM SERPL-SCNC: 4.3 MMOL/L (ref 3.5–5.1)
PROT SERPL-MCNC: 7.1 G/DL (ref 6–8.4)
RBC # BLD AUTO: 4.59 M/UL (ref 4–5.4)
SODIUM SERPL-SCNC: 140 MMOL/L (ref 136–145)
T4 FREE SERPL-MCNC: 0.99 NG/DL (ref 0.71–1.51)
TRIGL SERPL-MCNC: 53 MG/DL (ref 30–150)
TSH SERPL DL<=0.005 MIU/L-ACNC: 2.72 UIU/ML (ref 0.4–4)
WBC # BLD AUTO: 5.31 K/UL (ref 3.9–12.7)

## 2019-07-23 PROCEDURE — 84443 ASSAY THYROID STIM HORMONE: CPT

## 2019-07-23 PROCEDURE — 3008F BODY MASS INDEX DOCD: CPT | Mod: CPTII,S$GLB,, | Performed by: NURSE PRACTITIONER

## 2019-07-23 PROCEDURE — 3078F DIAST BP <80 MM HG: CPT | Mod: CPTII,S$GLB,, | Performed by: NURSE PRACTITIONER

## 2019-07-23 PROCEDURE — 3008F PR BODY MASS INDEX (BMI) DOCUMENTED: ICD-10-PCS | Mod: CPTII,S$GLB,, | Performed by: NURSE PRACTITIONER

## 2019-07-23 PROCEDURE — 99214 PR OFFICE/OUTPT VISIT, EST, LEVL IV, 30-39 MIN: ICD-10-PCS | Mod: S$GLB,,, | Performed by: NURSE PRACTITIONER

## 2019-07-23 PROCEDURE — 3078F PR MOST RECENT DIASTOLIC BLOOD PRESSURE < 80 MM HG: ICD-10-PCS | Mod: CPTII,S$GLB,, | Performed by: NURSE PRACTITIONER

## 2019-07-23 PROCEDURE — 99999 PR PBB SHADOW E&M-EST. PATIENT-LVL III: ICD-10-PCS | Mod: PBBFAC,,, | Performed by: NURSE PRACTITIONER

## 2019-07-23 PROCEDURE — 80053 COMPREHEN METABOLIC PANEL: CPT

## 2019-07-23 PROCEDURE — 36415 COLL VENOUS BLD VENIPUNCTURE: CPT

## 2019-07-23 PROCEDURE — 3074F SYST BP LT 130 MM HG: CPT | Mod: CPTII,S$GLB,, | Performed by: NURSE PRACTITIONER

## 2019-07-23 PROCEDURE — 85027 COMPLETE CBC AUTOMATED: CPT

## 2019-07-23 PROCEDURE — 3074F PR MOST RECENT SYSTOLIC BLOOD PRESSURE < 130 MM HG: ICD-10-PCS | Mod: CPTII,S$GLB,, | Performed by: NURSE PRACTITIONER

## 2019-07-23 PROCEDURE — 80061 LIPID PANEL: CPT

## 2019-07-23 PROCEDURE — 99214 OFFICE O/P EST MOD 30 MIN: CPT | Mod: S$GLB,,, | Performed by: NURSE PRACTITIONER

## 2019-07-23 PROCEDURE — 82306 VITAMIN D 25 HYDROXY: CPT

## 2019-07-23 PROCEDURE — 84439 ASSAY OF FREE THYROXINE: CPT

## 2019-07-23 PROCEDURE — 99999 PR PBB SHADOW E&M-EST. PATIENT-LVL III: CPT | Mod: PBBFAC,,, | Performed by: NURSE PRACTITIONER

## 2019-09-16 DIAGNOSIS — Z17.0 CARCINOMA OF AXILLARY TAIL OF RIGHT BREAST IN FEMALE, ESTROGEN RECEPTOR POSITIVE: ICD-10-CM

## 2019-09-16 DIAGNOSIS — C50.611 CARCINOMA OF AXILLARY TAIL OF RIGHT BREAST IN FEMALE, ESTROGEN RECEPTOR POSITIVE: ICD-10-CM

## 2019-09-16 RX ORDER — TAMOXIFEN CITRATE 20 MG/1
20 TABLET ORAL DAILY
Qty: 90 TABLET | Refills: 3 | Status: SHIPPED | OUTPATIENT
Start: 2019-09-16 | End: 2020-09-29 | Stop reason: SDUPTHER

## 2019-10-23 ENCOUNTER — PATIENT MESSAGE (OUTPATIENT)
Dept: HEMATOLOGY/ONCOLOGY | Facility: CLINIC | Age: 49
End: 2019-10-23

## 2019-10-28 ENCOUNTER — OFFICE VISIT (OUTPATIENT)
Dept: OBSTETRICS AND GYNECOLOGY | Facility: CLINIC | Age: 49
End: 2019-10-28
Attending: OBSTETRICS & GYNECOLOGY
Payer: COMMERCIAL

## 2019-10-28 VITALS
DIASTOLIC BLOOD PRESSURE: 62 MMHG | BODY MASS INDEX: 20.19 KG/M2 | SYSTOLIC BLOOD PRESSURE: 100 MMHG | WEIGHT: 118.25 LBS | HEIGHT: 64 IN

## 2019-10-28 DIAGNOSIS — Z12.4 SCREENING FOR MALIGNANT NEOPLASM OF CERVIX: Primary | ICD-10-CM

## 2019-10-28 DIAGNOSIS — Z85.3 HISTORY OF BREAST CANCER: ICD-10-CM

## 2019-10-28 DIAGNOSIS — Z01.419 ENCOUNTER FOR GYNECOLOGICAL EXAMINATION WITHOUT ABNORMAL FINDING: ICD-10-CM

## 2019-10-28 PROCEDURE — 99396 PREV VISIT EST AGE 40-64: CPT | Mod: S$GLB,,, | Performed by: OBSTETRICS & GYNECOLOGY

## 2019-10-28 PROCEDURE — 99396 PR PREVENTIVE VISIT,EST,40-64: ICD-10-PCS | Mod: S$GLB,,, | Performed by: OBSTETRICS & GYNECOLOGY

## 2019-10-28 PROCEDURE — 3078F DIAST BP <80 MM HG: CPT | Mod: CPTII,S$GLB,, | Performed by: OBSTETRICS & GYNECOLOGY

## 2019-10-28 PROCEDURE — 88175 CYTOPATH C/V AUTO FLUID REDO: CPT

## 2019-10-28 PROCEDURE — 3074F SYST BP LT 130 MM HG: CPT | Mod: CPTII,S$GLB,, | Performed by: OBSTETRICS & GYNECOLOGY

## 2019-10-28 PROCEDURE — 3074F PR MOST RECENT SYSTOLIC BLOOD PRESSURE < 130 MM HG: ICD-10-PCS | Mod: CPTII,S$GLB,, | Performed by: OBSTETRICS & GYNECOLOGY

## 2019-10-28 PROCEDURE — 87624 HPV HI-RISK TYP POOLED RSLT: CPT

## 2019-10-28 PROCEDURE — 3078F PR MOST RECENT DIASTOLIC BLOOD PRESSURE < 80 MM HG: ICD-10-PCS | Mod: CPTII,S$GLB,, | Performed by: OBSTETRICS & GYNECOLOGY

## 2019-10-28 RX ORDER — SUMATRIPTAN SUCCINATE 100 MG/1
100 TABLET ORAL ONCE AS NEEDED
Qty: 12 TABLET | Refills: 2 | Status: SHIPPED | OUTPATIENT
Start: 2019-10-28 | End: 2019-10-28

## 2019-10-28 NOTE — PROGRESS NOTES
"SUBJECTIVE:   49 y.o. female   for annual routine Pap and checkup. No LMP recorded..  She is ocnsidering having breast revision   She is taking Tamoxifen- having joint pains and hot flashes,. She does report occasional hot flashes as well. .    She has seen orthopedic surgery for what feels like tennis elbow in both arms".  She reports having injections and had PRP on her left arm  She is not taking vitamin-D  She does report occasional migraines and she would like to have a refill on her Imitrex    Past Medical History:   Diagnosis Date    Migraine     SERM use (selective estrogen receptor modulator)      Past Surgical History:   Procedure Laterality Date    BREAST BIOPSY      BREAST RECONSTRUCTION       SECTION      triplets    COLONOSCOPY N/A 5/3/2019    Procedure: COLONOSCOPY;  Surgeon: Jonah Alarcon MD;  Location: [address]);  Service: Endoscopy;  Laterality: N/A;    INSERTION OF BREAST IMPLANT      MASTECTOMY  2018    bilateral skin sparing mastectomy, right axillary sentinal lymph node biopsy, injection of technetium sulfur colliod into right breast     TONSILLECTOMY       Social History     Socioeconomic History    Marital status:      Spouse name: Not on file    Number of children: Not on file    Years of education: Not on file    Highest education level: Not on file   Occupational History    Not on file   Social Needs    Financial resource strain: Not on file    Food insecurity:     Worry: Not on file     Inability: Not on file    Transportation needs:     Medical: Not on file     Non-medical: Not on file   Tobacco Use    Smoking status: Never Smoker    Smokeless tobacco: Never Used   Substance and Sexual Activity    Alcohol use: Yes     Alcohol/week: 2.0 - 3.0 standard drinks     Types: 2 - 3 Glasses of wine per week    Drug use: No    Sexual activity: Yes     Partners: Male   Lifestyle    Physical activity:     Days per week: Not on file    "  Minutes per session: Not on file    Stress: Not on file   Relationships    Social connections:     Talks on phone: Not on file     Gets together: Not on file     Attends Rastafari service: Not on file     Active member of club or organization: Not on file     Attends meetings of clubs or organizations: Not on file     Relationship status: Not on file   Other Topics Concern    Not on file   Social History Narrative    Not on file     Family History   Problem Relation Age of Onset    Breast cancer Maternal Grandmother         great granmother     Cancer Neg Hx     Colon cancer Neg Hx     Ovarian cancer Neg Hx      OB History    Para Term  AB Living   2 2 2         SAB TAB Ectopic Multiple Live Births                  # Outcome Date GA Lbr Mike/2nd Weight Sex Delivery Anes PTL Lv   2 Term            1 Term                    Current Outpatient Medications   Medication Sig Dispense Refill    spironolactone (ALDACTONE) 100 MG tablet Take 100 mg by mouth once daily.       sumatriptan (IMITREX) 100 MG tablet Take 100 mg by mouth once.       sumatriptan (IMITREX) 100 MG tablet Take 1 tablet (100 mg total) by mouth once as needed for Migraine. 12 tablet 2    tamoxifen (NOLVADEX) 20 MG Tab Take 1 tablet (20 mg total) by mouth once daily. 90 tablet 3     No current facility-administered medications for this visit.      Allergies: Bactrim [sulfamethoxazole-trimethoprim]     The 10-year ASCVD risk score (Wilfridjuan miguel MENDOZA Jr., et al., 2013) is: 0.4%    Values used to calculate the score:      Age: 49 years      Sex: Female      Is Non- : No      Diabetic: No      Tobacco smoker: No      Systolic Blood Pressure: 100 mmHg      Is BP treated: No      HDL Cholesterol: 79 mg/dL      Total Cholesterol: 187 mg/dL      ROS:  Constitutional: no weight loss, weight gain, fever, fatigue  Eyes:  No vision changes, glasses/contacts  ENT/Mouth: No ulcers, sinus problems, ears ringing,  headache  Cardiovascular: No inability to lie flat, chest pain, exercise intolerance, swelling, heart palpitations  Respiratory: No wheezing, coughing blood, shortness of breath, or cough  Gastrointestinal: No diarrhea, bloody stool, nausea/vomiting, constipation, gas, hemorrhoids  Genitourinary: No blood in urine, painful urination, urgency of urination, frequency of urination, incomplete emptying, incontinence, abnormal bleeding, painful periods, heavy periods, vaginal discharge, vaginal odor, painful intercourse, sexual problems, bleeding after intercourse.  Musculoskeletal: No muscle weakness  Skin/Breast: No painful breasts, nipple discharge, masses, rash, ulcers  Neurological: No passing out, seizures, numbness, headache  Endocrine: No diabetes, hypothyroid, hyperthyroid, +hot flashes, hair loss, abnormal hair growth, acne, +night sweats  Psychiatric: No depression, crying  Hematologic: No bruises, bleeding, swollen lymph nodes, anemia.      Physical Exam:   Constitutional: She is oriented to person, place, and time. She appears well-developed and well-nourished.      Neck: Normal range of motion. No tracheal deviation present. No thyromegaly present.    Cardiovascular: Exam reveals no edema.     Pulmonary/Chest: Effort normal. She exhibits no tenderness, no deformity and no retraction. Breasts are asymmetrical.        Abdominal: Soft. She exhibits no distension and no mass. There is no tenderness. There is no rebound and no guarding. No hernia. Hernia confirmed negative in the left inguinal area.     Genitourinary: Vagina normal and uterus normal. Rectal exam shows no external hemorrhoid. There is no rash, tenderness or lesion on the right labia. There is no rash, tenderness or lesion on the left labia. Uterus is not deviated. Cervix is normal. No no adexnal prolapse. Right adnexum displays no mass, no tenderness and no fullness. Left adnexum displays no mass, no tenderness and no fullness. No tenderness,  bleeding, rectocele, cystocele or unspecified prolapse of vaginal walls in the vagina. No vaginal discharge found. Cervix exhibits no motion tenderness, no discharge and no friability.           Musculoskeletal: Normal range of motion and moves all extremeties. She exhibits no edema.      Lymphadenopathy:        Right: No inguinal adenopathy present.        Left: No inguinal adenopathy present.    Neurological: She is alert and oriented to person, place, and time.    Skin: No rash noted. No erythema. No pallor.    Psychiatric: She has a normal mood and affect. Her behavior is normal. Judgment and thought content normal.     Status post bilateral mastectomies with implants in place      ASSESSMENT:   well woman  Use of Tamoxifen   History of breast cancer  PLAN:   pap smear  Recommend acupuncture for her arthralgias as well as her menopausal symptoms  Encouraged her to consider yoga for her exercise regimen  return annually or prn

## 2019-10-31 ENCOUNTER — PATIENT MESSAGE (OUTPATIENT)
Dept: OBSTETRICS AND GYNECOLOGY | Facility: CLINIC | Age: 49
End: 2019-10-31

## 2019-10-31 LAB
HPV HR 12 DNA CVX QL NAA+PROBE: NEGATIVE
HPV16 AG SPEC QL: NEGATIVE
HPV18 DNA SPEC QL NAA+PROBE: NEGATIVE

## 2019-11-14 ENCOUNTER — TELEPHONE (OUTPATIENT)
Dept: HEMATOLOGY/ONCOLOGY | Facility: CLINIC | Age: 49
End: 2019-11-14

## 2019-11-15 ENCOUNTER — OFFICE VISIT (OUTPATIENT)
Dept: HEMATOLOGY/ONCOLOGY | Facility: CLINIC | Age: 49
End: 2019-11-15
Payer: COMMERCIAL

## 2019-11-15 DIAGNOSIS — Z17.0 MALIGNANT NEOPLASM OF UPPER-OUTER QUADRANT OF RIGHT BREAST IN FEMALE, ESTROGEN RECEPTOR POSITIVE: Primary | ICD-10-CM

## 2019-11-15 DIAGNOSIS — C50.411 MALIGNANT NEOPLASM OF UPPER-OUTER QUADRANT OF RIGHT BREAST IN FEMALE, ESTROGEN RECEPTOR POSITIVE: Primary | ICD-10-CM

## 2019-11-15 DIAGNOSIS — Z79.810 SERM USE (SELECTIVE ESTROGEN RECEPTOR MODULATOR): ICD-10-CM

## 2019-11-15 PROCEDURE — 99999 PR PBB SHADOW E&M-EST. PATIENT-LVL II: CPT | Mod: PBBFAC,,, | Performed by: INTERNAL MEDICINE

## 2019-11-15 PROCEDURE — 99213 PR OFFICE/OUTPT VISIT, EST, LEVL III, 20-29 MIN: ICD-10-PCS | Mod: S$GLB,,, | Performed by: INTERNAL MEDICINE

## 2019-11-15 PROCEDURE — 99213 OFFICE O/P EST LOW 20 MIN: CPT | Mod: S$GLB,,, | Performed by: INTERNAL MEDICINE

## 2019-11-15 PROCEDURE — 99999 PR PBB SHADOW E&M-EST. PATIENT-LVL II: ICD-10-PCS | Mod: PBBFAC,,, | Performed by: INTERNAL MEDICINE

## 2019-11-15 NOTE — PROGRESS NOTES
Subjective:       Patient ID: Roberta Wheat is a 49 y.o. female.    Chief Complaint: No chief complaint on file.    HPI     Mrs. Wheat returns today for follow up.  She has been on tamoxifen since April 2018 and has tolerated it well.   At the time of her mastectomies she had a 1.2 cm carcinoma with mixed ductal and lobular features, while two sentinel nodes were negative.  There was LCIS in the nipple duct, and the nipples were removed.  Resection margins were clear.     Briefly, she is a 49-year-old  female who underwent bilateral mastectomies for a 12 mm mixed lobular and infiltrating ductal carcinoma that was ER strongly positive, IL positive, and HER-2 negative.  Ki-67 was 30%.  Her Oncotype score was 17.    She is still having periods, although they are somewhat irregular.      Review of Systems      Overall she feels OK.  She has tolerated the tamoxifen well.  Her only complaint today is intermittent pains on her forearms.  She denies any depression, easy bruising, fevers, chills, night  sweats, weight loss, nausea, vomiting, diarrhea, constipation, diplopia, blurred vision, headache, chest pain, palpitations, shortness of breath, breast pain, abdominal pain, extremity pain, or difficulty ambulating.  The remainder of the ten-point ROS, including general, skin, lymph, H/N, cardiorespiratory, GI, , Neuro, Endocrine, and psychiatric is negative.     Objective:      Physical Exam      She is alert, oriented to time, place, person, pleasant, well      nourished, in no acute physical distress.                                VITAL SIGNS:  Reviewed                                      HEENT:  Normal.  There are no nasal, oral, lip, gingival, auricular, lid,    or conjunctival lesions.  Mucosae are moist and pink, and there is no        thrush.  Pupils are equal, reactive to light and accommodation.              Extraocular muscle movements are intact.  Dentition is good.  There is no frontal or  maxillary tenderness.                                     NECK:  Supple without JVD, adenopathy, or thyromegaly.                       LUNGS:  Clear to auscultation without wheezing, rales, or rhonchi.           CARDIOVASCULAR:  Reveals an S1, S2, no murmurs, no rubs, no gallops.         ABDOMEN:  Soft, nontender, without organomegaly.  Bowel sounds are    present.                                                                     EXTREMITIES:  No cyanosis, clubbing, or edema.                               BREASTS:  she is s/p bilateral mastectomies with prostheses in place.  Her incisions have healed nicely and there is no evidence of a chest wall recurrence.                             LYMPHATIC:  There is no cervical, axillary, or supraclavicular adenopathy.   SKIN:  Warm and moist, without petechiae, rashes, induration, or ecchymoses.           NEUROLOGIC:  DTRs are 0-1+ bilaterally, symmetrical, motor function is 5/5,  and cranial nerves are  within normal limits.    Assessment:       1. Malignant neoplasm of upper-outer quadrant of right breast in female, estrogen receptor positive    2. SERM use (selective estrogen receptor modulator)      2.    LCIS  Plan:        I had a long discussion with her.  She will remain on tamoxifen, that she should take for either 5 years (through April 2023) followed by 5 years of AIs if she becomes postmenopausal, or 10 years of she is still pre or perimenopasual in April 2023.  I will see her in 4 months.   Her multiple questions were answered to her satisfaction.

## 2019-11-15 NOTE — LETTER
November 15, 2019      Mily Morris MD  [address]  [address]  [facility] [address]           [facility]ology Oncology  [address]  [place] [address]  Phone: [phone]          Patient: Roberta Wheat   MR Number: [id number]   YOB: 1970   Date of Visit: 11/15/2019       Dear Dr. Mily Morris:    Thank you for referring Roberta Wheat to me for evaluation. Attached you will find relevant portions of my assessment and plan of care.    If you have questions, please do not hesitate to call me. I look forward to following Roberta Wheat along with you.    Sincerely,    Bridger Blanchard MD    Enclosure  CC:  No Recipients    If you would like to receive this communication electronically, please contact [email or web address] or [phone] to request more information on [employer] access.    For providers and/or their staff who would like to refer a patient to [facility], please contact us through our one-stop-shop provider referral line, [facility], at [phone].    If you feel you have received this communication in error or would no longer like to receive these types of communications, please e-mail [email or web address]

## 2019-11-29 ENCOUNTER — PATIENT MESSAGE (OUTPATIENT)
Dept: HEMATOLOGY/ONCOLOGY | Facility: CLINIC | Age: 49
End: 2019-11-29

## 2019-12-02 NOTE — PROGRESS NOTES
Subjective:       Patient ID: Roberta Wheat is a 49 y.o. female.    Chief Complaint: No chief complaint on file.    HPI     Mrs. Wheat returns today requesting to be seen.  Apparently she has been experiencing pain in the left scapula for two weeks.  The pain os constant, and relieved by meloxicam.  She is concerned about it and had called yesterday requesting to be seen.  Of note, she has been on tamoxifen since April 2018 and has tolerated it well so far.   At the time of her mastectomies she had a 1.2 cm carcinoma with mixed ductal and lobular features, while two sentinel nodes were negative.  There was LCIS in the nipple duct, and the nipples were removed.  Resection margins were clear.     Briefly, she is a 49-year-old  female who underwent bilateral mastectomies for a 12 mm mixed lobular and infiltrating ductal carcinoma that was ER strongly positive, RI positive, and HER-2 negative.  Ki-67 was 30%.  Her Oncotype score was 17.      Review of Systems      Overall she feels OK.  She is somewhat anxious with the recent developments and complains of pain in the left superior scapula.  She denies any depression, easy bruising, fevers, chills, night  sweats, weight loss, nausea, vomiting, diarrhea, constipation, diplopia, blurred vision, headache, chest pain, palpitations, shortness of breath, breast pain, abdominal pain, extremity pain, or difficulty ambulating.  The remainder of the ten-point ROS, including general, skin, lymph, H/N, cardiorespiratory, GI, , Neuro, Endocrine, and psychiatric is negative.     Objective:      Physical Exam      She is alert, oriented to time, place, person, pleasant, well      nourished, in no acute physical distress.                                VITAL SIGNS:  Reviewed                                      HEENT:  Normal.  There are no nasal, oral, lip, gingival, auricular, lid,    or conjunctival lesions.  Mucosae are moist and pink, and there is no         thrush.  Pupils are equal, reactive to light and accommodation.              Extraocular muscle movements are intact.  Dentition is good.  There is no frontal or maxillary tenderness.                                     NECK:  Supple without JVD, adenopathy, or thyromegaly.                       LUNGS:  Clear to auscultation without wheezing, rales, or rhonchi.           CARDIOVASCULAR:  Reveals an S1, S2, no murmurs, no rubs, no gallops.         ABDOMEN:  Soft, nontender, without organomegaly.  Bowel sounds are    present.                                                                     EXTREMITIES:  No cyanosis, clubbing, or edema.                               BREASTS:  she is s/p bilateral mastectomies with prostheses in place.  Her incisions have healed nicely and there is no evidence of a chest wall recurrence.                             LYMPHATIC:  There is no cervical, axillary, or supraclavicular adenopathy.   SKIN:  Warm and moist, without petechiae, rashes, induration, or ecchymoses.           NEUROLOGIC:  DTRs are 0-1+ bilaterally, symmetrical, motor function is 5/5,  and cranial nerves are  within normal limits.  tehre is tenderness on palpation of the left shoulder joint   Assessment:       1. Malignant neoplasm of upper-outer quadrant of right breast in female, estrogen receptor positive    2. SERM use (selective estrogen receptor modulator)      2.    LCIS  4.      New onset left scapular pain, most likely musculoskeletal.  Plan:        I had a long discussion with her.  Out of abundance of caution we will proceed with a bone scan, and if it is negative, I will call her with the results.  She will remain on tamoxifen for now and ksee me in a month for follow up..    Her multiple questions were answered to her satisfaction.

## 2019-12-03 ENCOUNTER — PATIENT MESSAGE (OUTPATIENT)
Dept: HEMATOLOGY/ONCOLOGY | Facility: CLINIC | Age: 49
End: 2019-12-03

## 2019-12-03 ENCOUNTER — OFFICE VISIT (OUTPATIENT)
Dept: HEMATOLOGY/ONCOLOGY | Facility: CLINIC | Age: 49
End: 2019-12-03
Payer: COMMERCIAL

## 2019-12-03 VITALS
RESPIRATION RATE: 16 BRPM | BODY MASS INDEX: 20.67 KG/M2 | HEART RATE: 71 BPM | WEIGHT: 121.06 LBS | TEMPERATURE: 99 F | SYSTOLIC BLOOD PRESSURE: 115 MMHG | HEIGHT: 64 IN | DIASTOLIC BLOOD PRESSURE: 66 MMHG | OXYGEN SATURATION: 100 %

## 2019-12-03 DIAGNOSIS — Z17.0 MALIGNANT NEOPLASM OF UPPER-OUTER QUADRANT OF RIGHT BREAST IN FEMALE, ESTROGEN RECEPTOR POSITIVE: Primary | ICD-10-CM

## 2019-12-03 DIAGNOSIS — C50.411 MALIGNANT NEOPLASM OF UPPER-OUTER QUADRANT OF RIGHT BREAST IN FEMALE, ESTROGEN RECEPTOR POSITIVE: Primary | ICD-10-CM

## 2019-12-03 DIAGNOSIS — Z79.810 SERM USE (SELECTIVE ESTROGEN RECEPTOR MODULATOR): ICD-10-CM

## 2019-12-03 DIAGNOSIS — M89.8X1 PAIN OF LEFT SCAPULA: ICD-10-CM

## 2019-12-03 PROCEDURE — 99214 OFFICE O/P EST MOD 30 MIN: CPT | Mod: S$GLB,,, | Performed by: INTERNAL MEDICINE

## 2019-12-03 PROCEDURE — 3008F BODY MASS INDEX DOCD: CPT | Mod: CPTII,S$GLB,, | Performed by: INTERNAL MEDICINE

## 2019-12-03 PROCEDURE — 3074F PR MOST RECENT SYSTOLIC BLOOD PRESSURE < 130 MM HG: ICD-10-PCS | Mod: CPTII,S$GLB,, | Performed by: INTERNAL MEDICINE

## 2019-12-03 PROCEDURE — 3074F SYST BP LT 130 MM HG: CPT | Mod: CPTII,S$GLB,, | Performed by: INTERNAL MEDICINE

## 2019-12-03 PROCEDURE — 3078F PR MOST RECENT DIASTOLIC BLOOD PRESSURE < 80 MM HG: ICD-10-PCS | Mod: CPTII,S$GLB,, | Performed by: INTERNAL MEDICINE

## 2019-12-03 PROCEDURE — 99999 PR PBB SHADOW E&M-EST. PATIENT-LVL III: CPT | Mod: PBBFAC,,, | Performed by: INTERNAL MEDICINE

## 2019-12-03 PROCEDURE — 3078F DIAST BP <80 MM HG: CPT | Mod: CPTII,S$GLB,, | Performed by: INTERNAL MEDICINE

## 2019-12-03 PROCEDURE — 99214 PR OFFICE/OUTPT VISIT, EST, LEVL IV, 30-39 MIN: ICD-10-PCS | Mod: S$GLB,,, | Performed by: INTERNAL MEDICINE

## 2019-12-03 PROCEDURE — 3008F PR BODY MASS INDEX (BMI) DOCUMENTED: ICD-10-PCS | Mod: CPTII,S$GLB,, | Performed by: INTERNAL MEDICINE

## 2019-12-03 PROCEDURE — 99999 PR PBB SHADOW E&M-EST. PATIENT-LVL III: ICD-10-PCS | Mod: PBBFAC,,, | Performed by: INTERNAL MEDICINE

## 2019-12-03 RX ORDER — CLINDAMYCIN PHOSPHATE AND BENZOYL PEROXIDE 10; 50 MG/G; MG/G
GEL TOPICAL
Refills: 5 | COMMUNITY
Start: 2019-11-07 | End: 2020-05-18

## 2019-12-04 ENCOUNTER — HOSPITAL ENCOUNTER (OUTPATIENT)
Dept: RADIOLOGY | Facility: HOSPITAL | Age: 49
Discharge: HOME | End: 2019-12-04
Attending: INTERNAL MEDICINE
Payer: COMMERCIAL

## 2019-12-04 ENCOUNTER — PATIENT MESSAGE (OUTPATIENT)
Dept: HEMATOLOGY/ONCOLOGY | Facility: CLINIC | Age: 49
End: 2019-12-04

## 2019-12-04 DIAGNOSIS — Z17.0 MALIGNANT NEOPLASM OF UPPER-OUTER QUADRANT OF RIGHT BREAST IN FEMALE, ESTROGEN RECEPTOR POSITIVE: ICD-10-CM

## 2019-12-04 DIAGNOSIS — M89.8X1 PAIN OF LEFT SCAPULA: ICD-10-CM

## 2019-12-04 DIAGNOSIS — C50.411 MALIGNANT NEOPLASM OF UPPER-OUTER QUADRANT OF RIGHT BREAST IN FEMALE, ESTROGEN RECEPTOR POSITIVE: ICD-10-CM

## 2019-12-04 DIAGNOSIS — Z79.810 SERM USE (SELECTIVE ESTROGEN RECEPTOR MODULATOR): ICD-10-CM

## 2019-12-04 PROCEDURE — 78306 NM BONE SCAN WHOLE BODY: ICD-10-PCS | Mod: 26,,, | Performed by: RADIOLOGY

## 2019-12-04 PROCEDURE — 78306 BONE IMAGING WHOLE BODY: CPT | Mod: 26,,, | Performed by: RADIOLOGY

## 2019-12-04 PROCEDURE — A9503 TC99M MEDRONATE: HCPCS

## 2020-01-14 ENCOUNTER — PATIENT MESSAGE (OUTPATIENT)
Dept: HEMATOLOGY/ONCOLOGY | Facility: CLINIC | Age: 50
End: 2020-01-14

## 2020-03-17 ENCOUNTER — PATIENT MESSAGE (OUTPATIENT)
Dept: HEMATOLOGY/ONCOLOGY | Facility: CLINIC | Age: 50
End: 2020-03-17

## 2020-03-18 NOTE — TELEPHONE ENCOUNTER
Spoke with patient this morning regarding our current up to date implementations our Clinic is applying to the current Covid 19 progression in our community. Informed patient that due for safety of all of our patients and staff, we are offering only VV's or delaying routine follow ups 2-3 months out.  Patient opted to delay her visit. She reports to feel fine, but prefers examination in clinic during her visit. Confirmed new appointment to early May, Monday the 4th at 740 a.m.  She voiced appreciation. Advised patient that as this is an evolving situation, this may change again in the future, unclear at this time.    She expressed gratitude.

## 2020-05-18 ENCOUNTER — OFFICE VISIT (OUTPATIENT)
Dept: HEMATOLOGY/ONCOLOGY | Facility: CLINIC | Age: 50
End: 2020-05-18
Payer: COMMERCIAL

## 2020-05-18 VITALS
WEIGHT: 124.31 LBS | DIASTOLIC BLOOD PRESSURE: 52 MMHG | HEIGHT: 64 IN | SYSTOLIC BLOOD PRESSURE: 99 MMHG | BODY MASS INDEX: 21.22 KG/M2 | RESPIRATION RATE: 18 BRPM | HEART RATE: 70 BPM | OXYGEN SATURATION: 98 %

## 2020-05-18 DIAGNOSIS — Z17.0 MALIGNANT NEOPLASM OF AXILLARY TAIL OF RIGHT BREAST IN FEMALE, ESTROGEN RECEPTOR POSITIVE: Primary | ICD-10-CM

## 2020-05-18 DIAGNOSIS — Z79.810 SERM USE (SELECTIVE ESTROGEN RECEPTOR MODULATOR): ICD-10-CM

## 2020-05-18 DIAGNOSIS — C50.611 MALIGNANT NEOPLASM OF AXILLARY TAIL OF RIGHT BREAST IN FEMALE, ESTROGEN RECEPTOR POSITIVE: Primary | ICD-10-CM

## 2020-05-18 PROCEDURE — 3078F PR MOST RECENT DIASTOLIC BLOOD PRESSURE < 80 MM HG: ICD-10-PCS | Mod: CPTII,S$GLB,, | Performed by: INTERNAL MEDICINE

## 2020-05-18 PROCEDURE — 99214 OFFICE O/P EST MOD 30 MIN: CPT | Mod: S$GLB,,, | Performed by: INTERNAL MEDICINE

## 2020-05-18 PROCEDURE — 99999 PR PBB SHADOW E&M-EST. PATIENT-LVL III: ICD-10-PCS | Mod: PBBFAC,,, | Performed by: INTERNAL MEDICINE

## 2020-05-18 PROCEDURE — 3078F DIAST BP <80 MM HG: CPT | Mod: CPTII,S$GLB,, | Performed by: INTERNAL MEDICINE

## 2020-05-18 PROCEDURE — 3008F BODY MASS INDEX DOCD: CPT | Mod: CPTII,S$GLB,, | Performed by: INTERNAL MEDICINE

## 2020-05-18 PROCEDURE — 3008F PR BODY MASS INDEX (BMI) DOCUMENTED: ICD-10-PCS | Mod: CPTII,S$GLB,, | Performed by: INTERNAL MEDICINE

## 2020-05-18 PROCEDURE — 3074F PR MOST RECENT SYSTOLIC BLOOD PRESSURE < 130 MM HG: ICD-10-PCS | Mod: CPTII,S$GLB,, | Performed by: INTERNAL MEDICINE

## 2020-05-18 PROCEDURE — 99999 PR PBB SHADOW E&M-EST. PATIENT-LVL III: CPT | Mod: PBBFAC,,, | Performed by: INTERNAL MEDICINE

## 2020-05-18 PROCEDURE — 99214 PR OFFICE/OUTPT VISIT, EST, LEVL IV, 30-39 MIN: ICD-10-PCS | Mod: S$GLB,,, | Performed by: INTERNAL MEDICINE

## 2020-05-18 PROCEDURE — 3074F SYST BP LT 130 MM HG: CPT | Mod: CPTII,S$GLB,, | Performed by: INTERNAL MEDICINE

## 2020-05-18 NOTE — PROGRESS NOTES
Subjective:       Patient ID: Roberta Wheat is a 50 y.o. female.    Chief Complaint: Malignant neoplasm of upper-outer quadrant of right breast i    HPI     Mrs. Wheat returns today for follow-up.  She has been on tamoxifen since April 2018 and has tolerated it well so far except for myalgias and occasional hot flashes..   At the time of her mastectomies she had a 1.2 cm carcinoma with mixed ductal and lobular features, while two sentinel nodes were negative.  There was LCIS in the nipple duct, and the nipples were removed.  Resection margins were clear.     Briefly, she is a 49-year-old  female who underwent bilateral mastectomies for a 12 mm mixed lobular and infiltrating ductal carcinoma that was ER strongly positive, NE positive, and HER-2 negative.  Ki-67 was 30%.  Her Oncotype score was 17.  She is currently perimenopausal and she had a period last week.  Her periods are rather irregular at this point.    Review of Systems      Overall she feels OK.  She mentions intermittent low back and leg pains which she attributes to exercising.  She denies any depression, easy bruising, fevers, chills, night  sweats, weight loss, nausea, vomiting, diarrhea, constipation, diplopia, blurred vision, headache, chest pain, palpitations, shortness of breath, breast pain, abdominal pain, or difficulty ambulating.  The remainder of the ten-point ROS, including general, skin, lymph, H/N, cardiorespiratory, GI, , Neuro, Endocrine, and psychiatric is negative.     Objective:      Physical Exam      She is alert, oriented to time, place, person, pleasant, well      nourished, in no acute physical distress.                                VITAL SIGNS:  Reviewed                                      HEENT:  Normal.  There are no nasal, oral, lip, gingival, auricular, lid,    or conjunctival lesions.  Mucosae are moist and pink, and there is no        thrush.  Pupils are equal, reactive to light and accommodation.               Extraocular muscle movements are intact.  Dentition is good.  There is no frontal or maxillary tenderness.                                     NECK:  Supple without JVD, adenopathy, or thyromegaly.                       LUNGS:  Clear to auscultation without wheezing, rales, or rhonchi.           CARDIOVASCULAR:  Reveals an S1, S2, no murmurs, no rubs, no gallops.         ABDOMEN:  Soft, nontender, without organomegaly.  Bowel sounds are    present.                                                                     EXTREMITIES:  No cyanosis, clubbing, or edema.                               BREASTS:  she is s/p bilateral mastectomies with prostheses in place.  Her incisions have healed nicely and there is no evidence of a chest wall recurrence.                             LYMPHATIC:  There is no cervical, axillary, or supraclavicular adenopathy.   SKIN:  Warm and moist, without petechiae, rashes, induration, or ecchymoses.           NEUROLOGIC:  DTRs are 0-1+ bilaterally, symmetrical, motor function is 5/5,  and cranial nerves are  within normal limits.  Assessment:       1. Malignant neoplasm of axillary tail of right breast in female, estrogen receptor positive    2. SERM use (selective estrogen receptor modulator)      3.    LCIS, status post bilateral mastectomies.  Plan:         I had a long discussion with Mrs. Wheat.  She will remain on tamoxifen through April 2023, at which point a decision will be made regarding a possible switch to aromatase inhibitors.  We also discussed the breast cancer index test which may give he rthe option of discontinuing treatment after 5 years.  She stated that she would be interested in pursuing that.   I told her that we do not need to send the test until closer to 2023.   Her multiple questions were answered to her satisfaction.

## 2020-06-11 ENCOUNTER — OFFICE VISIT (OUTPATIENT)
Dept: URGENT CARE | Facility: CLINIC | Age: 50
End: 2020-06-11
Payer: COMMERCIAL

## 2020-06-11 VITALS
OXYGEN SATURATION: 97 % | DIASTOLIC BLOOD PRESSURE: 74 MMHG | TEMPERATURE: 100 F | RESPIRATION RATE: 18 BRPM | HEART RATE: 74 BPM | SYSTOLIC BLOOD PRESSURE: 106 MMHG

## 2020-06-11 DIAGNOSIS — Z03.818 ENCOUNTER FOR OBSERVATION FOR SUSPECTED EXPOSURE TO OTHER BIOLOGICAL AGENTS RULED OUT: ICD-10-CM

## 2020-06-11 PROCEDURE — U0003 INFECTIOUS AGENT DETECTION BY NUCLEIC ACID (DNA OR RNA); SEVERE ACUTE RESPIRATORY SYNDROME CORONAVIRUS 2 (SARS-COV-2) (CORONAVIRUS DISEASE [COVID-19]), AMPLIFIED PROBE TECHNIQUE, MAKING USE OF HIGH THROUGHPUT TECHNOLOGIES AS DESCRIBED BY CMS-2020-01-R: HCPCS

## 2020-06-11 PROCEDURE — 99211 OFF/OP EST MAY X REQ PHY/QHP: CPT | Mod: S$GLB,,, | Performed by: PHYSICIAN ASSISTANT

## 2020-06-11 PROCEDURE — 99211 PR OFFICE/OUTPT VISIT, EST, LEVL I: ICD-10-PCS | Mod: S$GLB,,, | Performed by: PHYSICIAN ASSISTANT

## 2020-06-11 NOTE — PATIENT INSTRUCTIONS
We will call you with results    Please arrange follow up with your primary medical clinic as soon as possible. You must understand that you've received an Urgent Care treatment only and that you may be released before all of your medical problems are known or treated. You, the patient, will arrange for follow up as instructed. If your symptoms worsen or fail to improve you should go to the Emergency Room.  WE CANNOT RULE OUT ALL POSSIBLE CAUSES OF YOUR SYMPTOMS IN THE URGENT CARE SETTING PLEASE GO TO THE ER IF YOU FEELS YOUR CONDITION IS WORSENING OR YOU WOULD LIKE EMERGENT EVALUATION.    Please return here or go to the Emergency Department for any concerns or worsening of condition.  If you were prescribed antibiotics, please take them to completion.  If you were prescribed a narcotic medication, do not drive or operate heavy equipment or machinery while taking these medications.  Please follow up with your primary care doctor or specialist as needed.    If you  smoke, please stop smoking.

## 2020-06-11 NOTE — PROGRESS NOTES
Pt's daughter has a friend who was recently dx with covid, and the friend had been visiting the house recently     Subjective:       Patient ID: Roberta Wheat is a 50 y.o. female.    Vitals:  temperature is 99.5 °F (37.5 °C). Her blood pressure is 106/74 and her pulse is 74. Her respiration is 18 and oxygen saturation is 97%.     Chief Complaint: COVID-19 Concerns    Pt is asymptomatic and requesting covid swab. Exposed to positive person.      Constitution: Negative for chills, sweating, fatigue and fever.   HENT: Negative for ear pain, congestion, sinus pain, sinus pressure, sore throat and voice change.    Neck: Negative for painful lymph nodes.   Eyes: Negative for eye redness.   Respiratory: Negative for chest tightness, cough, sputum production, bloody sputum, COPD, shortness of breath, stridor, wheezing and asthma.    Gastrointestinal: Negative for nausea and vomiting.   Musculoskeletal: Negative for muscle ache.   Skin: Negative for rash.   Allergic/Immunologic: Negative for seasonal allergies and asthma.   Hematologic/Lymphatic: Negative for swollen lymph nodes.       Objective:      Physical Exam        Due to the state of emergency, this visit was conducted over the phone. Pt amenable with this. All concerns addressed    Assessment:       1. Encounter for observation for suspected exposure to other biological agents ruled out        Plan:         Encounter for observation for suspected exposure to other biological agents ruled out  -     COVID-19 Routine Screening    Labs reviewed, pertinent imaging reviewed, previous medical records, medical history, surgical history, social history, family history reviewed.       Patient Instructions   We will call you with results    Please arrange follow up with your primary medical clinic as soon as possible. You must understand that you've received an Urgent Care treatment only and that you may be released before all of your medical problems are known or  treated. You, the patient, will arrange for follow up as instructed. If your symptoms worsen or fail to improve you should go to the Emergency Room.  WE CANNOT RULE OUT ALL POSSIBLE CAUSES OF YOUR SYMPTOMS IN THE URGENT CARE SETTING PLEASE GO TO THE ER IF YOU FEELS YOUR CONDITION IS WORSENING OR YOU WOULD LIKE EMERGENT EVALUATION.    Please return here or go to the Emergency Department for any concerns or worsening of condition.  If you were prescribed antibiotics, please take them to completion.  If you were prescribed a narcotic medication, do not drive or operate heavy equipment or machinery while taking these medications.  Please follow up with your primary care doctor or specialist as needed.    If you  smoke, please stop smoking.

## 2020-06-12 ENCOUNTER — TELEPHONE (OUTPATIENT)
Dept: URGENT CARE | Facility: CLINIC | Age: 50
End: 2020-06-12

## 2020-06-12 LAB — SARS-COV-2 RNA RESP QL NAA+PROBE: NOT DETECTED

## 2020-08-20 NOTE — TELEPHONE ENCOUNTER
Patient scheduled for a colonoscopy on 5/3/19 at 1:00 pm with Dr Alarcon.   Purse String (Simple) Text: Given the location of the defect and the characteristics of the surrounding skin a purse string closure was deemed most appropriate.  Undermining was performed circumfirentially around the surgical defect.  A purse string suture was then placed and tightened.

## 2020-09-03 ENCOUNTER — OFFICE VISIT (OUTPATIENT)
Dept: CARDIOLOGY | Facility: CLINIC | Age: 50
End: 2020-09-03
Payer: COMMERCIAL

## 2020-09-03 ENCOUNTER — HOSPITAL ENCOUNTER (OUTPATIENT)
Dept: CARDIOLOGY | Facility: CLINIC | Age: 50
Discharge: HOME | End: 2020-09-03
Payer: COMMERCIAL

## 2020-09-03 VITALS
HEART RATE: 75 BPM | SYSTOLIC BLOOD PRESSURE: 105 MMHG | HEIGHT: 64 IN | WEIGHT: 122.13 LBS | BODY MASS INDEX: 20.85 KG/M2 | DIASTOLIC BLOOD PRESSURE: 57 MMHG

## 2020-09-03 DIAGNOSIS — Z01.810 PREOP CARDIOVASCULAR EXAM: ICD-10-CM

## 2020-09-03 DIAGNOSIS — Z01.810 PREOP CARDIOVASCULAR EXAM: Primary | ICD-10-CM

## 2020-09-03 PROCEDURE — 99203 OFFICE O/P NEW LOW 30 MIN: CPT | Mod: 25,S$GLB,, | Performed by: INTERNAL MEDICINE

## 2020-09-03 PROCEDURE — 93005 ELECTROCARDIOGRAM TRACING: CPT | Mod: S$GLB

## 2020-09-03 PROCEDURE — 93000 EKG 12-LEAD: ICD-10-PCS | Mod: S$GLB,ICN,, | Performed by: INTERNAL MEDICINE

## 2020-09-03 PROCEDURE — 99203 PR OFFICE/OUTPT VISIT, NEW, LEVL III, 30-44 MIN: ICD-10-PCS | Mod: 25,S$GLB,, | Performed by: INTERNAL MEDICINE

## 2020-09-03 PROCEDURE — 99999 PR PBB SHADOW E&M-EST. PATIENT-LVL III: ICD-10-PCS | Mod: PBBFAC,,, | Performed by: INTERNAL MEDICINE

## 2020-09-03 PROCEDURE — 3074F PR MOST RECENT SYSTOLIC BLOOD PRESSURE < 130 MM HG: ICD-10-PCS | Mod: CPTII,S$GLB,, | Performed by: INTERNAL MEDICINE

## 2020-09-03 PROCEDURE — 3078F PR MOST RECENT DIASTOLIC BLOOD PRESSURE < 80 MM HG: ICD-10-PCS | Mod: CPTII,S$GLB,, | Performed by: INTERNAL MEDICINE

## 2020-09-03 PROCEDURE — 93000 ELECTROCARDIOGRAM COMPLETE: CPT | Mod: S$GLB,ICN,, | Performed by: INTERNAL MEDICINE

## 2020-09-03 PROCEDURE — 3008F BODY MASS INDEX DOCD: CPT | Mod: CPTII,S$GLB,, | Performed by: INTERNAL MEDICINE

## 2020-09-03 PROCEDURE — 3008F PR BODY MASS INDEX (BMI) DOCUMENTED: ICD-10-PCS | Mod: CPTII,S$GLB,, | Performed by: INTERNAL MEDICINE

## 2020-09-03 PROCEDURE — 3074F SYST BP LT 130 MM HG: CPT | Mod: CPTII,S$GLB,, | Performed by: INTERNAL MEDICINE

## 2020-09-03 PROCEDURE — 3078F DIAST BP <80 MM HG: CPT | Mod: CPTII,S$GLB,, | Performed by: INTERNAL MEDICINE

## 2020-09-03 PROCEDURE — 99999 PR PBB SHADOW E&M-EST. PATIENT-LVL III: CPT | Mod: PBBFAC,,, | Performed by: INTERNAL MEDICINE

## 2020-09-03 NOTE — ASSESSMENT & PLAN NOTE
ECG reviewed and no findings concerning or indicative of further workup at this time.      PRE-OP:  - RCRI Score: 0  - No history of ischemic heart disease  - No history of congestive heart failure  - No history of cerebrovascular disease (stroke or TIA)  - No history of DM preoperative insulin use  - No chronic kidney disease (creatinine > 2 mg/dL)  - Not undergoing suprainguinal vascular, intraperitoneal, or intrathoracic surgery    METs --> >4  Patient performs all ADL's  *Based on the Revised Cardiac Risk index, this patient has 3.9% risk of major cardiac event for low risk procedure  *Winter Perioperative Cardiac risk, this patient has <1% estimated risk probability for perioperative MI or Cardiac arrest.     No further workup indicated from cardiovascular standpoint.

## 2020-09-03 NOTE — PROGRESS NOTES
"Mily Morris MD  @Cardiology@    Subjective:   Patient ID:  Roberta Wheat is a 50 y.o. female who presents for evaluation of pre-op risk stratification prior to surgical intervention for management of her breast cancer. Pt reported to have abnormal EKG findings. Pt has no prior cardiac disease or illness, No DM, CKD, or TIA/CVA in the past. She is very active and participates in an exercise program with no signs or symptoms on angina.     EKG reviewed and only notable for possible left axis deviation. EKG automated read states "cannot rule out anterior infarct." On review of EKG, there is no evidence of anterior infarct and patient has no signs of it either.      Review of Systems   Constitution: Negative for chills, decreased appetite and fever.   HENT: Negative for congestion and sore throat.    Eyes: Negative for blurred vision and discharge.   Cardiovascular: Negative for chest pain, claudication, cyanosis, dyspnea on exertion and leg swelling.   Respiratory: Negative for cough, hemoptysis and shortness of breath.    Endocrine: Negative for cold intolerance and heat intolerance.   Skin: Negative for color change.   Musculoskeletal: Negative for muscle weakness and myalgias.   Gastrointestinal: Negative for bloating and abdominal pain.   Neurological: Negative for dizziness, focal weakness and weakness.   Psychiatric/Behavioral: Negative for altered mental status and depression.       Past Medical History:   Diagnosis Date    Migraine     SERM use (selective estrogen receptor modulator)        Past Surgical History:   Procedure Laterality Date    BREAST BIOPSY      BREAST RECONSTRUCTION       SECTION      triplets    COLONOSCOPY N/A 5/3/2019    Procedure: COLONOSCOPY;  Surgeon: Jonah Alarcon MD;  Location: [address];  Service: Endoscopy;  Laterality: N/A;    INSERTION OF BREAST IMPLANT      MASTECTOMY  2018    bilateral skin sparing mastectomy, right axillary " "sentinal lymph node biopsy, injection of technetium sulfur colliod into right breast     TONSILLECTOMY         Family History   Problem Relation Age of Onset    Breast cancer Maternal Grandmother         great granmother     Cancer Neg Hx     Colon cancer Neg Hx     Ovarian cancer Neg Hx          Current Outpatient Medications:     spironolactone (ALDACTONE) 100 MG tablet, Take 100 mg by mouth once daily. , Disp: , Rfl:     sumatriptan (IMITREX) 100 MG tablet, Take 100 mg by mouth once. , Disp: , Rfl:     tamoxifen (NOLVADEX) 20 MG Tab, Take 1 tablet (20 mg total) by mouth once daily., Disp: 90 tablet, Rfl: 3  Objective:   BP (!) 105/57 (BP Location: Left arm, Patient Position: Sitting, BP Method: Large (Automatic))   Pulse 75   Ht 5' 3.5" (1.613 m)   Wt 55.4 kg (122 lb 2.2 oz)   BMI 21.30 kg/m²      Physical Exam   Constitutional: She is oriented to person, place, and time. She appears well-developed and well-nourished.   HENT:   Head: Normocephalic and atraumatic.   Right Ear: External ear normal.   Left Ear: External ear normal.   Eyes: Conjunctivae and EOM are normal.   Neck: Normal range of motion. Neck supple.   Cardiovascular: Normal rate, regular rhythm, normal heart sounds and intact distal pulses.   Pulses:       Radial pulses are 2+ on the right side and 2+ on the left side.   Pulmonary/Chest: Effort normal and breath sounds normal. No respiratory distress. She has no wheezes.   Abdominal: Soft. Bowel sounds are normal. She exhibits no distension. There is no abdominal tenderness.   Musculoskeletal: Normal range of motion.         General: No edema.   Neurological: She is alert and oriented to person, place, and time.   Skin: Skin is warm and dry.   Psychiatric: She has a normal mood and affect. Her behavior is normal.       Lab Results   Component Value Date     07/23/2019    K 4.3 07/23/2019     07/23/2019    CO2 29 07/23/2019    BUN 15 07/23/2019    CREATININE 0.8 07/23/2019    " ANIONGAP 7 (L) 07/23/2019     No results found for: HGBA1C  No results found for: BNP, BNPTRIAGEBLO    Lab Results   Component Value Date    WBC 5.31 07/23/2019    HGB 14.3 07/23/2019    HCT 45.0 07/23/2019     07/23/2019    GRAN 2.6 07/23/2019     Lab Results   Component Value Date    CHOL 187 07/23/2019    HDL 79 (H) 07/23/2019    LDLCALC 97.4 07/23/2019    TRIG 53 07/23/2019        Assessment:     1. Preop cardiovascular exam        Plan:   Preop cardiovascular exam  ECG reviewed and no findings concerning or indicative of further workup at this time.      PRE-OP:  - RCRI Score: 0  - No history of ischemic heart disease  - No history of congestive heart failure  - No history of cerebrovascular disease (stroke or TIA)  - No history of DM preoperative insulin use  - No chronic kidney disease (creatinine > 2 mg/dL)  - Not undergoing suprainguinal vascular, intraperitoneal, or intrathoracic surgery    METs --> >4  Patient performs all ADL's  *Based on the Revised Cardiac Risk index, this patient has 3.9% risk of major cardiac event for low risk procedure  *Winter Perioperative Cardiac risk, this patient has <1% estimated risk probability for perioperative MI or Cardiac arrest.     No further workup indicated from cardiovascular standpoint.        Pt s/e examined with staff, Dr. Beasley, who agrees with management and plan.    Keyon Kelly MD  Cardiovascular Disease Fellow PGY IV  Pager [phone]

## 2020-09-29 DIAGNOSIS — C50.611 CARCINOMA OF AXILLARY TAIL OF RIGHT BREAST IN FEMALE, ESTROGEN RECEPTOR POSITIVE: ICD-10-CM

## 2020-09-29 DIAGNOSIS — Z17.0 CARCINOMA OF AXILLARY TAIL OF RIGHT BREAST IN FEMALE, ESTROGEN RECEPTOR POSITIVE: ICD-10-CM

## 2020-09-29 RX ORDER — TAMOXIFEN CITRATE 20 MG/1
20 TABLET ORAL DAILY
Qty: 90 TABLET | Refills: 3 | OUTPATIENT
Start: 2020-09-29

## 2020-09-29 RX ORDER — TAMOXIFEN CITRATE 20 MG/1
20 TABLET ORAL DAILY
Qty: 90 TABLET | Refills: 3 | Status: SHIPPED | OUTPATIENT
Start: 2020-09-29 | End: 2021-11-04

## 2020-09-30 ENCOUNTER — OFFICE VISIT (OUTPATIENT)
Dept: HEMATOLOGY/ONCOLOGY | Facility: CLINIC | Age: 50
End: 2020-09-30
Payer: COMMERCIAL

## 2020-09-30 VITALS
WEIGHT: 122.81 LBS | OXYGEN SATURATION: 100 % | TEMPERATURE: 98 F | HEART RATE: 85 BPM | HEIGHT: 64 IN | DIASTOLIC BLOOD PRESSURE: 55 MMHG | RESPIRATION RATE: 16 BRPM | SYSTOLIC BLOOD PRESSURE: 98 MMHG | BODY MASS INDEX: 20.97 KG/M2

## 2020-09-30 DIAGNOSIS — C50.611 MALIGNANT NEOPLASM OF AXILLARY TAIL OF RIGHT BREAST IN FEMALE, ESTROGEN RECEPTOR POSITIVE: Primary | ICD-10-CM

## 2020-09-30 DIAGNOSIS — Z17.0 MALIGNANT NEOPLASM OF AXILLARY TAIL OF RIGHT BREAST IN FEMALE, ESTROGEN RECEPTOR POSITIVE: Primary | ICD-10-CM

## 2020-09-30 DIAGNOSIS — Z79.810 SERM USE (SELECTIVE ESTROGEN RECEPTOR MODULATOR): ICD-10-CM

## 2020-09-30 PROCEDURE — 99213 OFFICE O/P EST LOW 20 MIN: CPT | Mod: S$GLB,,, | Performed by: INTERNAL MEDICINE

## 2020-09-30 PROCEDURE — 99999 PR PBB SHADOW E&M-EST. PATIENT-LVL III: CPT | Mod: PBBFAC,,, | Performed by: INTERNAL MEDICINE

## 2020-09-30 PROCEDURE — 3074F SYST BP LT 130 MM HG: CPT | Mod: CPTII,S$GLB,, | Performed by: INTERNAL MEDICINE

## 2020-09-30 PROCEDURE — 3008F PR BODY MASS INDEX (BMI) DOCUMENTED: ICD-10-PCS | Mod: CPTII,S$GLB,, | Performed by: INTERNAL MEDICINE

## 2020-09-30 PROCEDURE — 99999 PR PBB SHADOW E&M-EST. PATIENT-LVL III: ICD-10-PCS | Mod: PBBFAC,,, | Performed by: INTERNAL MEDICINE

## 2020-09-30 PROCEDURE — 3074F PR MOST RECENT SYSTOLIC BLOOD PRESSURE < 130 MM HG: ICD-10-PCS | Mod: CPTII,S$GLB,, | Performed by: INTERNAL MEDICINE

## 2020-09-30 PROCEDURE — 3078F DIAST BP <80 MM HG: CPT | Mod: CPTII,S$GLB,, | Performed by: INTERNAL MEDICINE

## 2020-09-30 PROCEDURE — 99213 PR OFFICE/OUTPT VISIT, EST, LEVL III, 20-29 MIN: ICD-10-PCS | Mod: S$GLB,,, | Performed by: INTERNAL MEDICINE

## 2020-09-30 PROCEDURE — 3078F PR MOST RECENT DIASTOLIC BLOOD PRESSURE < 80 MM HG: ICD-10-PCS | Mod: CPTII,S$GLB,, | Performed by: INTERNAL MEDICINE

## 2020-09-30 PROCEDURE — 3008F BODY MASS INDEX DOCD: CPT | Mod: CPTII,S$GLB,, | Performed by: INTERNAL MEDICINE

## 2020-09-30 NOTE — PROGRESS NOTES
Subjective:       Patient ID: Roberta Wheat is a 50 y.o. female.    Chief Complaint: No chief complaint on file.    HPI     Mrs. Wheat returns today for follow-up.  She has been on tamoxifen since April 2018 and has tolerated it well so far.  In early September she underwent another reconstructive surgery.  She has full recover.  At the time of her mastectomies she had a 1.2 cm carcinoma with mixed ductal and lobular features, while two sentinel nodes were negative.  There was LCIS in the nipple duct, and the nipples were removed.  Resection margins were clear.     Briefly, she is a 50-year-old  female who underwent bilateral mastectomies for a 12 mm mixed lobular and infiltrating ductal carcinoma that was ER strongly positive, NE positive, and HER-2 negative.  Ki-67 was 30%.  Her Oncotype score was 17.  She is perimenopausal.  Her periods are rather irregular at this point.    Review of Systems      Overall she feels OK.  ECOG PS is 0.  She denies any depression, easy bruising, fevers, chills, night  sweats, weight loss, nausea, vomiting, diarrhea, constipation, diplopia, blurred vision, headache, chest pain, palpitations, shortness of breath, breast pain, abdominal pain, or difficulty ambulating.  The remainder of the ten-point ROS, including general, skin, lymph, H/N, cardiorespiratory, GI, , Neuro, Endocrine, and psychiatric is negative.     Objective:      Physical Exam      She is alert, oriented to time, place, person, pleasant, well      nourished, in no acute physical distress.                                VITAL SIGNS:  Reviewed                                      HEENT:  Normal.  There are no nasal, oral, lip, gingival, auricular, lid,    or conjunctival lesions.  Mucosae are moist and pink, and there is no        thrush.  Pupils are equal, reactive to light and accommodation.              Extraocular muscle movements are intact.  Dentition is good.  There is no frontal or maxillary  tenderness.                                     NECK:  Supple without JVD, adenopathy, or thyromegaly.                       LUNGS:  Clear to auscultation without wheezing, rales, or rhonchi.           CARDIOVASCULAR:  Reveals an S1, S2, no murmurs, no rubs, no gallops.         ABDOMEN:  Soft, nontender, without organomegaly.  Bowel sounds are    present.                                                                     EXTREMITIES:  No cyanosis, clubbing, or edema.                               BREASTS:  she is s/p bilateral mastectomies with prostheses in place.  Her new inframammary incisions are healing nicely and there is no evidence of a chest wall recurrence.                             LYMPHATIC:  There is no cervical, axillary, or supraclavicular adenopathy.   SKIN:  Warm and moist, without petechiae, rashes, induration, or ecchymoses.           NEUROLOGIC:  DTRs are 0-1+ bilaterally, symmetrical, motor function is 5/5,  and cranial nerves are  within normal limits.  Assessment:       1. Malignant neoplasm of axillary tail of right breast in female, estrogen receptor positive    2. SERM use (selective estrogen receptor modulator)      3.    LCIS, status post bilateral mastectomies.  Plan:         I had a long discussion with Mrs. Wheat.  She will remain on tamoxifen through April 2023, at which point a decision will be made regarding a possible switch to aromatase inhibitors.    Her multiple questions were answered to her satisfaction.

## 2021-01-12 LAB
MISCELLANEOUS TEST NAME: NORMAL
REFERENCE LAB: NORMAL
SPECIMEN TYPE: NORMAL
TEST RESULT: NORMAL

## 2021-02-01 ENCOUNTER — OFFICE VISIT (OUTPATIENT)
Dept: HEMATOLOGY/ONCOLOGY | Facility: CLINIC | Age: 51
End: 2021-02-01
Payer: COMMERCIAL

## 2021-02-01 VITALS
HEIGHT: 64 IN | SYSTOLIC BLOOD PRESSURE: 99 MMHG | TEMPERATURE: 98 F | BODY MASS INDEX: 20.78 KG/M2 | RESPIRATION RATE: 18 BRPM | DIASTOLIC BLOOD PRESSURE: 68 MMHG | WEIGHT: 121.69 LBS | OXYGEN SATURATION: 98 % | HEART RATE: 67 BPM

## 2021-02-01 DIAGNOSIS — Z17.0 MALIGNANT NEOPLASM OF AXILLARY TAIL OF RIGHT BREAST IN FEMALE, ESTROGEN RECEPTOR POSITIVE: Primary | ICD-10-CM

## 2021-02-01 DIAGNOSIS — C50.611 MALIGNANT NEOPLASM OF AXILLARY TAIL OF RIGHT BREAST IN FEMALE, ESTROGEN RECEPTOR POSITIVE: Primary | ICD-10-CM

## 2021-02-01 DIAGNOSIS — Z79.810 SERM USE (SELECTIVE ESTROGEN RECEPTOR MODULATOR): ICD-10-CM

## 2021-02-01 PROCEDURE — 1126F AMNT PAIN NOTED NONE PRSNT: CPT | Mod: S$GLB,,, | Performed by: INTERNAL MEDICINE

## 2021-02-01 PROCEDURE — 99999 PR PBB SHADOW E&M-EST. PATIENT-LVL III: ICD-10-PCS | Mod: PBBFAC,,, | Performed by: INTERNAL MEDICINE

## 2021-02-01 PROCEDURE — 3074F PR MOST RECENT SYSTOLIC BLOOD PRESSURE < 130 MM HG: ICD-10-PCS | Mod: CPTII,S$GLB,, | Performed by: INTERNAL MEDICINE

## 2021-02-01 PROCEDURE — 3078F DIAST BP <80 MM HG: CPT | Mod: CPTII,S$GLB,, | Performed by: INTERNAL MEDICINE

## 2021-02-01 PROCEDURE — 3074F SYST BP LT 130 MM HG: CPT | Mod: CPTII,S$GLB,, | Performed by: INTERNAL MEDICINE

## 2021-02-01 PROCEDURE — 3008F BODY MASS INDEX DOCD: CPT | Mod: CPTII,S$GLB,, | Performed by: INTERNAL MEDICINE

## 2021-02-01 PROCEDURE — 3078F PR MOST RECENT DIASTOLIC BLOOD PRESSURE < 80 MM HG: ICD-10-PCS | Mod: CPTII,S$GLB,, | Performed by: INTERNAL MEDICINE

## 2021-02-01 PROCEDURE — 1126F PR PAIN SEVERITY QUANTIFIED, NO PAIN PRESENT: ICD-10-PCS | Mod: S$GLB,,, | Performed by: INTERNAL MEDICINE

## 2021-02-01 PROCEDURE — 99213 OFFICE O/P EST LOW 20 MIN: CPT | Mod: S$GLB,,, | Performed by: INTERNAL MEDICINE

## 2021-02-01 PROCEDURE — 3008F PR BODY MASS INDEX (BMI) DOCUMENTED: ICD-10-PCS | Mod: CPTII,S$GLB,, | Performed by: INTERNAL MEDICINE

## 2021-02-01 PROCEDURE — 99213 PR OFFICE/OUTPT VISIT, EST, LEVL III, 20-29 MIN: ICD-10-PCS | Mod: S$GLB,,, | Performed by: INTERNAL MEDICINE

## 2021-02-01 PROCEDURE — 99999 PR PBB SHADOW E&M-EST. PATIENT-LVL III: CPT | Mod: PBBFAC,,, | Performed by: INTERNAL MEDICINE

## 2021-04-16 ENCOUNTER — PATIENT MESSAGE (OUTPATIENT)
Dept: RESEARCH | Facility: HOSPITAL | Age: 51
End: 2021-04-16

## 2021-04-30 ENCOUNTER — PATIENT MESSAGE (OUTPATIENT)
Dept: HEMATOLOGY/ONCOLOGY | Facility: CLINIC | Age: 51
End: 2021-04-30

## 2021-07-08 ENCOUNTER — OFFICE VISIT (OUTPATIENT)
Dept: HEMATOLOGY/ONCOLOGY | Facility: CLINIC | Age: 51
End: 2021-07-08
Payer: COMMERCIAL

## 2021-07-08 VITALS
HEART RATE: 67 BPM | BODY MASS INDEX: 21.27 KG/M2 | SYSTOLIC BLOOD PRESSURE: 103 MMHG | WEIGHT: 124.56 LBS | OXYGEN SATURATION: 97 % | RESPIRATION RATE: 18 BRPM | HEIGHT: 64 IN | DIASTOLIC BLOOD PRESSURE: 64 MMHG | TEMPERATURE: 98 F

## 2021-07-08 DIAGNOSIS — C50.611 MALIGNANT NEOPLASM OF AXILLARY TAIL OF RIGHT BREAST IN FEMALE, ESTROGEN RECEPTOR POSITIVE: Primary | ICD-10-CM

## 2021-07-08 DIAGNOSIS — Z17.0 MALIGNANT NEOPLASM OF AXILLARY TAIL OF RIGHT BREAST IN FEMALE, ESTROGEN RECEPTOR POSITIVE: Primary | ICD-10-CM

## 2021-07-08 DIAGNOSIS — Z79.810 SERM USE (SELECTIVE ESTROGEN RECEPTOR MODULATOR): ICD-10-CM

## 2021-07-08 PROCEDURE — 1126F AMNT PAIN NOTED NONE PRSNT: CPT | Mod: S$GLB,,, | Performed by: INTERNAL MEDICINE

## 2021-07-08 PROCEDURE — 3008F PR BODY MASS INDEX (BMI) DOCUMENTED: ICD-10-PCS | Mod: CPTII,S$GLB,, | Performed by: INTERNAL MEDICINE

## 2021-07-08 PROCEDURE — 1126F PR PAIN SEVERITY QUANTIFIED, NO PAIN PRESENT: ICD-10-PCS | Mod: S$GLB,,, | Performed by: INTERNAL MEDICINE

## 2021-07-08 PROCEDURE — 99214 OFFICE O/P EST MOD 30 MIN: CPT | Mod: S$GLB,,, | Performed by: INTERNAL MEDICINE

## 2021-07-08 PROCEDURE — 99214 PR OFFICE/OUTPT VISIT, EST, LEVL IV, 30-39 MIN: ICD-10-PCS | Mod: S$GLB,,, | Performed by: INTERNAL MEDICINE

## 2021-07-08 PROCEDURE — 99999 PR PBB SHADOW E&M-EST. PATIENT-LVL III: ICD-10-PCS | Mod: PBBFAC,,, | Performed by: INTERNAL MEDICINE

## 2021-07-08 PROCEDURE — 3008F BODY MASS INDEX DOCD: CPT | Mod: CPTII,S$GLB,, | Performed by: INTERNAL MEDICINE

## 2021-07-08 PROCEDURE — 99999 PR PBB SHADOW E&M-EST. PATIENT-LVL III: CPT | Mod: PBBFAC,,, | Performed by: INTERNAL MEDICINE

## 2021-10-18 ENCOUNTER — PATIENT MESSAGE (OUTPATIENT)
Dept: HEMATOLOGY/ONCOLOGY | Facility: CLINIC | Age: 51
End: 2021-10-18
Payer: COMMERCIAL

## 2021-11-04 ENCOUNTER — OFFICE VISIT (OUTPATIENT)
Dept: OBSTETRICS AND GYNECOLOGY | Facility: CLINIC | Age: 51
End: 2021-11-04
Attending: OBSTETRICS & GYNECOLOGY
Payer: COMMERCIAL

## 2021-11-04 VITALS
WEIGHT: 127 LBS | HEIGHT: 63 IN | HEART RATE: 78 BPM | DIASTOLIC BLOOD PRESSURE: 72 MMHG | SYSTOLIC BLOOD PRESSURE: 110 MMHG | BODY MASS INDEX: 22.5 KG/M2

## 2021-11-04 DIAGNOSIS — Z17.0 MALIGNANT NEOPLASM OF BREAST IN FEMALE, ESTROGEN RECEPTOR POSITIVE, UNSPECIFIED LATERALITY, UNSPECIFIED SITE OF BREAST: ICD-10-CM

## 2021-11-04 DIAGNOSIS — Z01.419 ENCOUNTER FOR GYNECOLOGICAL EXAMINATION WITHOUT ABNORMAL FINDING: Primary | ICD-10-CM

## 2021-11-04 DIAGNOSIS — C50.919 MALIGNANT NEOPLASM OF BREAST IN FEMALE, ESTROGEN RECEPTOR POSITIVE, UNSPECIFIED LATERALITY, UNSPECIFIED SITE OF BREAST: ICD-10-CM

## 2021-11-04 DIAGNOSIS — N95.2 VAGINAL ATROPHY: ICD-10-CM

## 2021-11-04 PROCEDURE — 1159F MED LIST DOCD IN RCRD: CPT | Mod: CPTII,S$GLB,, | Performed by: OBSTETRICS & GYNECOLOGY

## 2021-11-04 PROCEDURE — 99396 PR PREVENTIVE VISIT,EST,40-64: ICD-10-PCS | Mod: S$GLB,,, | Performed by: OBSTETRICS & GYNECOLOGY

## 2021-11-04 PROCEDURE — 3074F PR MOST RECENT SYSTOLIC BLOOD PRESSURE < 130 MM HG: ICD-10-PCS | Mod: CPTII,S$GLB,, | Performed by: OBSTETRICS & GYNECOLOGY

## 2021-11-04 PROCEDURE — 99396 PREV VISIT EST AGE 40-64: CPT | Mod: S$GLB,,, | Performed by: OBSTETRICS & GYNECOLOGY

## 2021-11-04 PROCEDURE — 3008F BODY MASS INDEX DOCD: CPT | Mod: CPTII,S$GLB,, | Performed by: OBSTETRICS & GYNECOLOGY

## 2021-11-04 PROCEDURE — 1159F PR MEDICATION LIST DOCUMENTED IN MEDICAL RECORD: ICD-10-PCS | Mod: CPTII,S$GLB,, | Performed by: OBSTETRICS & GYNECOLOGY

## 2021-11-04 PROCEDURE — 3008F PR BODY MASS INDEX (BMI) DOCUMENTED: ICD-10-PCS | Mod: CPTII,S$GLB,, | Performed by: OBSTETRICS & GYNECOLOGY

## 2021-11-04 PROCEDURE — 3078F DIAST BP <80 MM HG: CPT | Mod: CPTII,S$GLB,, | Performed by: OBSTETRICS & GYNECOLOGY

## 2021-11-04 PROCEDURE — 3074F SYST BP LT 130 MM HG: CPT | Mod: CPTII,S$GLB,, | Performed by: OBSTETRICS & GYNECOLOGY

## 2021-11-04 PROCEDURE — 3078F PR MOST RECENT DIASTOLIC BLOOD PRESSURE < 80 MM HG: ICD-10-PCS | Mod: CPTII,S$GLB,, | Performed by: OBSTETRICS & GYNECOLOGY

## 2021-11-20 ENCOUNTER — PATIENT MESSAGE (OUTPATIENT)
Dept: OBSTETRICS AND GYNECOLOGY | Facility: CLINIC | Age: 51
End: 2021-11-20
Payer: COMMERCIAL

## 2021-12-06 ENCOUNTER — OFFICE VISIT (OUTPATIENT)
Dept: HEMATOLOGY/ONCOLOGY | Facility: CLINIC | Age: 51
End: 2021-12-06
Payer: COMMERCIAL

## 2021-12-06 VITALS
DIASTOLIC BLOOD PRESSURE: 60 MMHG | BODY MASS INDEX: 22.06 KG/M2 | TEMPERATURE: 98 F | SYSTOLIC BLOOD PRESSURE: 110 MMHG | RESPIRATION RATE: 18 BRPM | OXYGEN SATURATION: 99 % | HEART RATE: 62 BPM | HEIGHT: 64 IN | WEIGHT: 129.19 LBS

## 2021-12-06 DIAGNOSIS — C50.611 MALIGNANT NEOPLASM OF AXILLARY TAIL OF RIGHT BREAST IN FEMALE, ESTROGEN RECEPTOR POSITIVE: Primary | ICD-10-CM

## 2021-12-06 DIAGNOSIS — G43.719 INTRACTABLE CHRONIC MIGRAINE WITHOUT AURA AND WITHOUT STATUS MIGRAINOSUS: ICD-10-CM

## 2021-12-06 DIAGNOSIS — Z17.0 MALIGNANT NEOPLASM OF AXILLARY TAIL OF RIGHT BREAST IN FEMALE, ESTROGEN RECEPTOR POSITIVE: Primary | ICD-10-CM

## 2021-12-06 DIAGNOSIS — Z79.810 SERM USE (SELECTIVE ESTROGEN RECEPTOR MODULATOR): ICD-10-CM

## 2021-12-06 PROCEDURE — 99999 PR PBB SHADOW E&M-EST. PATIENT-LVL III: CPT | Mod: PBBFAC,,, | Performed by: INTERNAL MEDICINE

## 2021-12-06 PROCEDURE — 99214 PR OFFICE/OUTPT VISIT, EST, LEVL IV, 30-39 MIN: ICD-10-PCS | Mod: S$GLB,,, | Performed by: INTERNAL MEDICINE

## 2021-12-06 PROCEDURE — 99214 OFFICE O/P EST MOD 30 MIN: CPT | Mod: S$GLB,,, | Performed by: INTERNAL MEDICINE

## 2021-12-06 PROCEDURE — 99999 PR PBB SHADOW E&M-EST. PATIENT-LVL III: ICD-10-PCS | Mod: PBBFAC,,, | Performed by: INTERNAL MEDICINE

## 2021-12-06 RX ORDER — SUMATRIPTAN SUCCINATE 100 MG/1
TABLET ORAL
Qty: 30 TABLET | Refills: 0 | Status: SHIPPED | OUTPATIENT
Start: 2021-12-06

## 2021-12-06 RX ORDER — SUMATRIPTAN SUCCINATE 100 MG/1
TABLET ORAL
Qty: 1 TABLET | Refills: 0 | Status: SHIPPED | OUTPATIENT
Start: 2021-12-06 | End: 2021-12-06 | Stop reason: SDUPTHER

## 2022-04-05 PROBLEM — C50.911 MALIGNANT NEOPLASM OF RIGHT BREAST IN FEMALE, ESTROGEN RECEPTOR POSITIVE: Status: RESOLVED | Noted: 2019-04-04 | Resolved: 2022-04-05

## 2022-04-05 PROBLEM — Z17.0 MALIGNANT NEOPLASM OF RIGHT BREAST IN FEMALE, ESTROGEN RECEPTOR POSITIVE: Status: RESOLVED | Noted: 2019-04-04 | Resolved: 2022-04-05

## 2022-04-05 PROBLEM — Z79.810 SERM USE (SELECTIVE ESTROGEN RECEPTOR MODULATOR): Status: RESOLVED | Noted: 2018-05-18 | Resolved: 2022-04-05

## 2022-04-05 NOTE — PROGRESS NOTES
Subjective:       Patient ID: Roberta Wheat is a 52 y.o. female.    Chief Complaint: No chief complaint on file.    HPI     Mrs. Wheat returns today for follow-up.  She has been on tamoxifen since April 2018 and has tolerated it well so far.  At the time of her mastectomies she had a 1.2 cm carcinoma with mixed ductal and lobular features, while two sentinel nodes were negative.  There was LCIS in the nipple duct, and the nipples were removed.  Resection margins were clear.     Briefly, she is a 52-year-old  female who underwent bilateral mastectomies for a 12 mm mixed lobular and infiltrating ductal carcinoma that was ER strongly positive, VA positive, and HER-2 negative.  Ki-67 was 30%.  Her Oncotype score was 17.  She is perimenopausal.  Her periods are rather irregular at this point, and her last period was in May 2021.  On April 22nd 2021 she underwent a resection of a lesion in the forehead that ended up being an in Situ melanoma.  Resection margins were clear.  She is going to [facility] every for 4 months for skin surveillance visit      Review of Systems      Overall she feels OK.  ECOG PS is 0. When asked, she admits to frequent hot flashes.  As mentioned above, her LMP was in May 2021.  She denies any depression, easy bruising, fevers, chills, night  sweats, weight loss, nausea, vomiting, diarrhea, constipation, diplopia, blurred vision, headache, chest pain, palpitations, shortness of breath, breast pain, abdominal pain, or difficulty ambulating.  The remainder of the ten-point ROS, including general, skin, lymph, H/N, cardiorespiratory, GI, , Neuro, Endocrine, and psychiatric is negative.     Objective:      Physical Exam      She is alert, oriented to time, place, person, pleasant, well      nourished, in no acute physical distress.                                VITAL SIGNS:  Reviewed                                      HEENT:  Normal.  There are no nasal, oral, lip, gingival,  auricular, lid,    or conjunctival lesions.  Mucosae are moist and pink, and there is no        thrush.  Pupils are equal, reactive to light and accommodation.              Extraocular muscle movements are intact.  Dentition is good.  There is no frontal or maxillary tenderness.     NECK:  Supple without JVD, adenopathy, or thyromegaly.                       LUNGS:  Clear to auscultation without wheezing, rales, or rhonchi.           CARDIOVASCULAR:  Reveals an S1, S2, no murmurs, no rubs, no gallops.         ABDOMEN:  Soft, nontender, without organomegaly.  Bowel sounds are    present.                                                                     EXTREMITIES:  No cyanosis, clubbing, or edema.                               BREASTS:  she is s/p bilateral mastectomies with prostheses in place.  Her incisions have healed nicely and there is no evidence of a chest wall recurrence.                             LYMPHATIC:  There is no cervical, axillary, or supraclavicular adenopathy.   SKIN:  Warm and moist, without petechiae, rashes, induration, or ecchymoses.           NEUROLOGIC:  DTRs are 0-1+ bilaterally, symmetrical, motor function is 5/5,  and cranial nerves are  within normal limits.  Assessment:       1. History of breast cancer in female    2. SERM use (selective estrogen receptor modulator)      3.    LCIS, status post bilateral mastectomies.  4.      Status post resection of an in Situ melanoma from the forehead.    Plan:         I had a long discussion with Mrs. Wheat.  She will remain on tamoxifen through April 2023, at which point a decision will be made about a possible switch to aromatase inhibitors versus continuing tamoxifen for 10 years.  I explained to her that she has to be at least a year from her last period before we can contemplate a possible switch to aromatase inhibitors.  As mentioned above, her last period was in May 2021 so she is still consideed perimenopausal.    In regards to the in  Situ melanoma, no additional treatment is needed at this point.    I will see her again in 6 months.    Her multiple questions were answered to her satisfaction.

## 2022-04-06 ENCOUNTER — OFFICE VISIT (OUTPATIENT)
Dept: HEMATOLOGY/ONCOLOGY | Facility: CLINIC | Age: 52
End: 2022-04-06
Payer: COMMERCIAL

## 2022-04-06 VITALS
DIASTOLIC BLOOD PRESSURE: 61 MMHG | OXYGEN SATURATION: 100 % | RESPIRATION RATE: 18 BRPM | HEART RATE: 78 BPM | WEIGHT: 126.75 LBS | SYSTOLIC BLOOD PRESSURE: 117 MMHG | BODY MASS INDEX: 22.46 KG/M2 | TEMPERATURE: 99 F | HEIGHT: 63 IN

## 2022-04-06 DIAGNOSIS — Z79.810 SERM USE (SELECTIVE ESTROGEN RECEPTOR MODULATOR): ICD-10-CM

## 2022-04-06 DIAGNOSIS — Z85.3 HISTORY OF BREAST CANCER IN FEMALE: Primary | ICD-10-CM

## 2022-04-06 PROCEDURE — 99214 OFFICE O/P EST MOD 30 MIN: CPT | Mod: S$GLB,,, | Performed by: INTERNAL MEDICINE

## 2022-04-06 PROCEDURE — 99214 PR OFFICE/OUTPT VISIT, EST, LEVL IV, 30-39 MIN: ICD-10-PCS | Mod: S$GLB,,, | Performed by: INTERNAL MEDICINE

## 2022-04-06 PROCEDURE — 99999 PR PBB SHADOW E&M-EST. PATIENT-LVL III: ICD-10-PCS | Mod: PBBFAC,,, | Performed by: INTERNAL MEDICINE

## 2022-04-06 PROCEDURE — 99999 PR PBB SHADOW E&M-EST. PATIENT-LVL III: CPT | Mod: PBBFAC,,, | Performed by: INTERNAL MEDICINE

## 2022-07-19 ENCOUNTER — PATIENT MESSAGE (OUTPATIENT)
Dept: OBSTETRICS AND GYNECOLOGY | Facility: CLINIC | Age: 52
End: 2022-07-19
Payer: COMMERCIAL

## 2022-08-01 ENCOUNTER — OFFICE VISIT (OUTPATIENT)
Dept: OBSTETRICS AND GYNECOLOGY | Facility: CLINIC | Age: 52
End: 2022-08-01
Attending: OBSTETRICS & GYNECOLOGY
Payer: COMMERCIAL

## 2022-08-01 DIAGNOSIS — N94.19 DYSPAREUNIA DUE TO MEDICAL CONDITION IN FEMALE: ICD-10-CM

## 2022-08-01 DIAGNOSIS — Z79.810 SERM USE (SELECTIVE ESTROGEN RECEPTOR MODULATOR): ICD-10-CM

## 2022-08-01 DIAGNOSIS — N89.8 VAGINAL DRYNESS: Primary | ICD-10-CM

## 2022-08-01 PROCEDURE — 99213 OFFICE O/P EST LOW 20 MIN: CPT | Mod: 95,,, | Performed by: OBSTETRICS & GYNECOLOGY

## 2022-08-01 PROCEDURE — 99213 PR OFFICE/OUTPT VISIT, EST, LEVL III, 20-29 MIN: ICD-10-PCS | Mod: 95,,, | Performed by: OBSTETRICS & GYNECOLOGY

## 2022-08-01 NOTE — PROGRESS NOTES
"  The patient location is: home   The chief complaint leading to consultation is: 20 minutes    Visit type: audiovisual    Face to Face time with patient: 20 minutes of total time spent on the encounter, which includes face to face time and non-face to face time preparing to see the patient (eg, review of tests), Obtaining and/or reviewing separately obtained history, Documenting clinical information in the electronic or other health record, Independently interpreting results (not separately reported) and communicating results to the patient/family/caregiver, or Care coordination (not separately reported).         Each patient to whom he or she provides medical services by telemedicine is:  (1) informed of the relationship between the physician and patient and the respective role of any other health care provider with respect to management of the patient; and (2) notified that he or she may decline to receive medical services by telemedicine and may withdraw from such care at any time.    Notes:   SUBJECTIVE:   52 y.o. female   Presents today to discuss vaginal dryness and pain with intercourse. . No LMP recorded..  She quit using moisturizer- "too messy" .    She reports that she tried moisturizer and did not like it. She reports still having vaginal dryness and pain with intercourse.     Past Medical History:   Diagnosis Date    Breast cancer     Melanoma     Migraine     SERM use (selective estrogen receptor modulator)      Past Surgical History:   Procedure Laterality Date    BREAST BIOPSY      BREAST RECONSTRUCTION       and second      SECTION      triplets    COLONOSCOPY N/A 5/3/2019    Procedure: COLONOSCOPY;  Surgeon: Jonah Alarcon MD;  Location: [address]);  Service: Endoscopy;  Laterality: N/A;    DENTAL SURGERY      INSERTION OF BREAST IMPLANT      MASTECTOMY  2018    bilateral skin sparing mastectomy, right axillary sentinal lymph node biopsy, " injection of technetium sulfur colliod into right breast     melanoma removed from forehead      TONSILLECTOMY       Social History     Socioeconomic History    Marital status:    Tobacco Use    Smoking status: Never Smoker    Smokeless tobacco: Never Used   Substance and Sexual Activity    Alcohol use: Yes     Alcohol/week: 2.0 - 3.0 standard drinks     Types: 2 - 3 Glasses of wine per week     Comment: weekend    Drug use: No    Sexual activity: Yes     Partners: Male     Family History   Problem Relation Age of Onset    Breast cancer Maternal Grandmother         great granmother     Cancer Neg Hx     Colon cancer Neg Hx     Ovarian cancer Neg Hx      OB History    Para Term  AB Living   2 1 1   1     SAB IAB Ectopic Multiple Live Births   1     1        # Outcome Date GA Lbr Mike/2nd Weight Sex Delivery Anes PTL Lv   2 SAB            1A Term      CS-LTranv      1B Term      CS-LTranv      1C Term      CS-LTranv              Current Outpatient Medications   Medication Sig Dispense Refill    spironolactone (ALDACTONE) 100 MG tablet Take 100 mg by mouth once daily.       sumatriptan (IMITREX) 100 MG tablet Take once daily as needed for migraine headaches 30 tablet 0    tamoxifen (NOLVADEX) 20 MG Tab TAKE 1 TABLET BY MOUTH ONCE DAILY 90 tablet 3     No current facility-administered medications for this visit.     Allergies: Bactrim [sulfamethoxazole-trimethoprim] and Sulfa (sulfonamide antibiotics)     The ASCVD Risk score (Absaraka REJI Jr., et al., 2013) failed to calculate for the following reasons:    Cannot find a previous HDL lab    Cannot find a previous total cholesterol lab      ROS:  Constitutional: no weight loss, weight gain, fever, fatigue    Cardiovascular: No inability to lie flat, chest pain, exercise intolerance, swelling, heart palpitations  Respiratory: No wheezing, coughing blood, shortness of breath, or cough  Gastrointestinal: No diarrhea, bloody stool,  nausea/vomiting, constipation, gas, hemorrhoids  Genitourinary: No blood in urine, painful urination, urgency of urination, frequency of urination, incomplete emptying, incontinence, abnormal bleeding, painful periods, heavy periods, vaginal discharge, vaginal odor,+ painful intercourse, sexual problems, bleeding after intercourse, +vaginal dryness.  Musculoskeletal: No muscle weakness  Skin/Breast: No painful breasts, nipple discharge, masses, rash, ulcers, +breast cancer  Neurological: No passing out, seizures, numbness, headache  Endocrine: No diabetes, hypothyroid, hyperthyroid, hot flashes, hair loss, abnormal hair growth, acne  Psychiatric: No depression, crying  Hematologic: No bruises, bleeding, swollen lymph nodes, anemia.      Physical Exam  deferred    ASSESSMENT:   Vaginal dryness  Dyspareunia  Use of Tamoxifen  PLAN:   Counseled her on safety and efficacy of intravaginal Estradiol- Imvexxy- use twice weekly after using nightly for 2 weeks. She voiced understanding.

## 2022-08-07 ENCOUNTER — PATIENT MESSAGE (OUTPATIENT)
Dept: OBSTETRICS AND GYNECOLOGY | Facility: CLINIC | Age: 52
End: 2022-08-07
Payer: COMMERCIAL

## 2022-08-08 DIAGNOSIS — N95.2 VAGINAL ATROPHY: Primary | ICD-10-CM

## 2022-08-08 RX ORDER — ESTRADIOL 10 UG/1
10 INSERT VAGINAL NIGHTLY
Qty: 18 EACH | Refills: 0 | Status: SHIPPED | OUTPATIENT
Start: 2022-08-08 | End: 2022-09-26

## 2022-08-08 RX ORDER — ESTRADIOL 10 UG/1
10 INSERT VAGINAL
Qty: 8 EACH | Refills: 11 | Status: SHIPPED | OUTPATIENT
Start: 2022-08-08 | End: 2024-02-19

## 2022-12-05 NOTE — PROGRESS NOTES
Subjective:       Patient ID: Roberat Wheat is a 52 y.o. female.    Chief Complaint: No chief complaint on file.    HPI     Mrs. Wheat returns today for follow-up.  I had last seen her in April 2022.  She has been on tamoxifen since April 2018 and has tolerated it well so far.  At the time of her mastectomies she had a 1.2 cm carcinoma with mixed ductal and lobular features, while two sentinel nodes were negative.  There was LCIS in the nipple duct, and the nipples were removed.  Resection margins were clear.     Briefly, she is a 52-year-old  female who underwent bilateral mastectomies for a 12 mm mixed lobular and infiltrating ductal carcinoma that was ER strongly positive, [place] positive, and HER-2 negative.  Ki-67 was 30%.  Her Oncotype score was 17.  She is perimenopausal.  Her periods are rather irregular at this point, and her last period was in May 2021.  On April 22nd 2021 she underwent a resection of a lesion in the forehead that ended up being an in Situ melanoma.  Resection margins were clear.  She is going to [facility] every for 4 months for skin surveillance visit      Review of Systems      Overall she feels OK, however, she states that for the last 4 days she has been experiencing profuse vaginal bleeding.  She denies any pain..  ECOG PS is 0.  Her LMP prior to the current 1 was in May 2021.  She appears concerned but she denies any depression, easy bruising, fevers, chills, night  sweats, weight loss, nausea, vomiting, diarrhea, constipation, diplopia, blurred vision, headache, chest pain, palpitations, shortness of breath, breast pain, abdominal pain, or difficulty ambulating.  The remainder of the ten-point ROS, including general, skin, lymph, H/N, cardiorespiratory, GI, , Neuro, Endocrine, and psychiatric is negative.     Objective:      Physical Exam      She is alert, oriented to time, place, person, pleasant, well      nourished, in no acute physical distress.                                 VITAL SIGNS:  Reviewed                                      HEENT:  Normal.  There are no nasal, oral, lip, gingival, auricular, lid,    or conjunctival lesions.  Mucosae are moist and pink, and there is no        thrush.  Pupils are equal, reactive to light and accommodation.              Extraocular muscle movements are intact.  Dentition is good.  There is no frontal or maxillary tenderness.     NECK:  Supple without JVD, adenopathy, or thyromegaly.                       LUNGS:  Clear to auscultation without wheezing, rales, or rhonchi.           CARDIOVASCULAR:  Reveals an S1, S2, no murmurs, no rubs, no gallops.         ABDOMEN:  Soft, nontender, without organomegaly.  Bowel sounds are    present.                                                                     EXTREMITIES:  No cyanosis, clubbing, or edema.                               BREASTS:  she is s/p bilateral mastectomies with prostheses in place.  Her incisions have healed nicely and there is no evidence of a chest wall recurrence.                             LYMPHATIC:  There is no cervical, axillary, or supraclavicular adenopathy.   SKIN:  Warm and moist, without petechiae, rashes, induration, or ecchymoses.           NEUROLOGIC:  DTRs are 0-1+ bilaterally, symmetrical, motor function is 5/5,  and cranial nerves are  within normal limits.  Assessment:       1. History of breast cancer in female    2. SERM use (selective estrogen receptor modulator)      3.    LCIS, status post bilateral mastectomies.  4.      Status post resection of an in Situ melanoma from the forehead.  5.       Dysfunctional uterine bleed  Plan:         I had a long discussion with Mrs. Wheat.  I am puzzled with the vaginal bleeding that she is experiencing, and I have emailed Dr. Morris asking her to see her ASAP.  She will remain on tamoxifen for now.  She will complete 5 years in late April 2023.    In regards to the in Situ melanoma, no additional treatment  is needed at this point.    I will see her again in 1 month  Her multiple questions were answered to her satisfaction.    Route Chart for Scheduling    Med Onc Chart Routing      Follow up with physician 1 month.   Follow up with [facility]    Infusion scheduling note    Injection scheduling note    Labs    Imaging    Pharmacy appointment    Other referrals

## 2022-12-07 ENCOUNTER — LAB VISIT (OUTPATIENT)
Dept: LAB | Facility: HOSPITAL | Age: 52
End: 2022-12-07
Attending: OBSTETRICS & GYNECOLOGY
Payer: COMMERCIAL

## 2022-12-07 ENCOUNTER — TELEPHONE (OUTPATIENT)
Dept: OBSTETRICS AND GYNECOLOGY | Facility: CLINIC | Age: 52
End: 2022-12-07
Payer: COMMERCIAL

## 2022-12-07 ENCOUNTER — OFFICE VISIT (OUTPATIENT)
Dept: HEMATOLOGY/ONCOLOGY | Facility: CLINIC | Age: 52
End: 2022-12-07
Payer: COMMERCIAL

## 2022-12-07 ENCOUNTER — PATIENT MESSAGE (OUTPATIENT)
Dept: OBSTETRICS AND GYNECOLOGY | Facility: CLINIC | Age: 52
End: 2022-12-07
Payer: COMMERCIAL

## 2022-12-07 VITALS
DIASTOLIC BLOOD PRESSURE: 58 MMHG | RESPIRATION RATE: 17 BRPM | HEIGHT: 63 IN | BODY MASS INDEX: 22.35 KG/M2 | OXYGEN SATURATION: 98 % | SYSTOLIC BLOOD PRESSURE: 102 MMHG | HEART RATE: 66 BPM | WEIGHT: 126.13 LBS | TEMPERATURE: 98 F

## 2022-12-07 DIAGNOSIS — Z79.810 LONG-TERM CURRENT USE OF TAMOXIFEN: ICD-10-CM

## 2022-12-07 DIAGNOSIS — Z85.3 HISTORY OF BREAST CANCER IN FEMALE: Primary | ICD-10-CM

## 2022-12-07 DIAGNOSIS — N93.9 VAGINAL BLEEDING: ICD-10-CM

## 2022-12-07 DIAGNOSIS — Z79.810 LONG-TERM CURRENT USE OF TAMOXIFEN: Primary | ICD-10-CM

## 2022-12-07 DIAGNOSIS — Z79.810 SERM USE (SELECTIVE ESTROGEN RECEPTOR MODULATOR): ICD-10-CM

## 2022-12-07 DIAGNOSIS — Z90.13 HISTORY OF MASTECTOMY, BILATERAL: ICD-10-CM

## 2022-12-07 LAB
ESTRADIOL SERPL-MCNC: <10 PG/ML
FSH SERPL-ACNC: 13.25 MIU/ML

## 2022-12-07 PROCEDURE — 3078F PR MOST RECENT DIASTOLIC BLOOD PRESSURE < 80 MM HG: ICD-10-PCS | Mod: CPTII,S$GLB,, | Performed by: INTERNAL MEDICINE

## 2022-12-07 PROCEDURE — 99999 PR PBB SHADOW E&M-EST. PATIENT-LVL IV: ICD-10-PCS | Mod: PBBFAC,,, | Performed by: INTERNAL MEDICINE

## 2022-12-07 PROCEDURE — 99999 PR PBB SHADOW E&M-EST. PATIENT-LVL IV: CPT | Mod: PBBFAC,,, | Performed by: INTERNAL MEDICINE

## 2022-12-07 PROCEDURE — 3008F PR BODY MASS INDEX (BMI) DOCUMENTED: ICD-10-PCS | Mod: CPTII,S$GLB,, | Performed by: INTERNAL MEDICINE

## 2022-12-07 PROCEDURE — 99214 OFFICE O/P EST MOD 30 MIN: CPT | Mod: S$GLB,,, | Performed by: INTERNAL MEDICINE

## 2022-12-07 PROCEDURE — 1159F PR MEDICATION LIST DOCUMENTED IN MEDICAL RECORD: ICD-10-PCS | Mod: CPTII,S$GLB,, | Performed by: INTERNAL MEDICINE

## 2022-12-07 PROCEDURE — 3008F BODY MASS INDEX DOCD: CPT | Mod: CPTII,S$GLB,, | Performed by: INTERNAL MEDICINE

## 2022-12-07 PROCEDURE — 1159F MED LIST DOCD IN RCRD: CPT | Mod: CPTII,S$GLB,, | Performed by: INTERNAL MEDICINE

## 2022-12-07 PROCEDURE — 82670 ASSAY OF TOTAL ESTRADIOL: CPT | Performed by: OBSTETRICS & GYNECOLOGY

## 2022-12-07 PROCEDURE — 99214 PR OFFICE/OUTPT VISIT, EST, LEVL IV, 30-39 MIN: ICD-10-PCS | Mod: S$GLB,,, | Performed by: INTERNAL MEDICINE

## 2022-12-07 PROCEDURE — 3074F PR MOST RECENT SYSTOLIC BLOOD PRESSURE < 130 MM HG: ICD-10-PCS | Mod: CPTII,S$GLB,, | Performed by: INTERNAL MEDICINE

## 2022-12-07 PROCEDURE — 36415 COLL VENOUS BLD VENIPUNCTURE: CPT | Mod: PN | Performed by: OBSTETRICS & GYNECOLOGY

## 2022-12-07 PROCEDURE — 3074F SYST BP LT 130 MM HG: CPT | Mod: CPTII,S$GLB,, | Performed by: INTERNAL MEDICINE

## 2022-12-07 PROCEDURE — 3078F DIAST BP <80 MM HG: CPT | Mod: CPTII,S$GLB,, | Performed by: INTERNAL MEDICINE

## 2022-12-07 PROCEDURE — 83001 ASSAY OF GONADOTROPIN (FSH): CPT | Performed by: OBSTETRICS & GYNECOLOGY

## 2022-12-07 NOTE — NURSING
Patient notes small amount of red vaginal bleeding started 2022.   Patient's LMP was 21.   Patient recently re-started Imvexxy last week.   Hx of 1 , patient had triplets.   Currently taking Tamoxifen and complaint on this regimen.     She notes persistent low flow since . Mild pelvic cramping started .     Denies pain. Notes history of colon polyps. Denies hx of fibroids, ovarian cysts to her knowledge.     2018, Last FSH was 24 and Estradiol 105.     Patient's case reviewed with Dr. Morris.     MD orders received for FSH and Estradiol level today. Pelvic Ultrasound with follow-up next week.     Labs scheduled today in [place] for 1220.   Pelvic US scheduled this 22 at 1700. Preparation reviewed with patient. She verbalizes understanding.     Appointment with Dr. Morris confirmed 12/15/22 at 0930.     RN Navigator discussed potential for EMB pending Pelvic US. Team will advise regarding any pre-procedure preparations following ultrasound. Patient confirms appts as above. She verbalizes understanding of the plan of care. All questions and concerns addressed to her satisfaction, EM Colvin.

## 2022-12-07 NOTE — NURSING
RN Navigator received a message from patient regarding vaginal bleeding on Tamoxifen therapy. Patient contacted via phone, she is on a conference call and will call me back, return contact information verbalized, EM Colvin.

## 2022-12-09 ENCOUNTER — PATIENT MESSAGE (OUTPATIENT)
Dept: HEMATOLOGY/ONCOLOGY | Facility: CLINIC | Age: 52
End: 2022-12-09
Payer: COMMERCIAL

## 2022-12-09 ENCOUNTER — HOSPITAL ENCOUNTER (OUTPATIENT)
Dept: RADIOLOGY | Facility: HOSPITAL | Age: 52
Discharge: HOME | End: 2022-12-09
Attending: OBSTETRICS & GYNECOLOGY
Payer: COMMERCIAL

## 2022-12-09 DIAGNOSIS — N93.9 VAGINAL BLEEDING: ICD-10-CM

## 2022-12-09 DIAGNOSIS — Z79.810 LONG-TERM CURRENT USE OF TAMOXIFEN: ICD-10-CM

## 2022-12-09 PROCEDURE — 76830 TRANSVAGINAL US NON-OB: CPT | Mod: TC

## 2022-12-09 PROCEDURE — 76830 US PELVIS COMP WITH TRANSVAG NON-OB (XPD): ICD-10-PCS | Mod: 26,,, | Performed by: RADIOLOGY

## 2022-12-09 PROCEDURE — 76856 US PELVIS COMP WITH TRANSVAG NON-OB (XPD): ICD-10-PCS | Mod: 26,,, | Performed by: RADIOLOGY

## 2022-12-09 PROCEDURE — 76830 TRANSVAGINAL US NON-OB: CPT | Mod: 26,,, | Performed by: RADIOLOGY

## 2022-12-09 PROCEDURE — 76856 US EXAM PELVIC COMPLETE: CPT | Mod: 26,,, | Performed by: RADIOLOGY

## 2022-12-11 ENCOUNTER — TELEPHONE (OUTPATIENT)
Dept: OBSTETRICS AND GYNECOLOGY | Facility: CLINIC | Age: 52
End: 2022-12-11
Payer: COMMERCIAL

## 2022-12-11 DIAGNOSIS — N92.1 MENORRHAGIA WITH IRREGULAR CYCLE: Primary | ICD-10-CM

## 2022-12-11 RX ORDER — MISOPROSTOL 200 UG/1
TABLET ORAL
Qty: 2 TABLET | Refills: 0 | Status: SHIPPED | OUTPATIENT
Start: 2022-12-11 | End: 2022-12-15

## 2022-12-15 ENCOUNTER — PROCEDURE VISIT (OUTPATIENT)
Dept: OBSTETRICS AND GYNECOLOGY | Facility: CLINIC | Age: 52
End: 2022-12-15
Attending: OBSTETRICS & GYNECOLOGY
Payer: COMMERCIAL

## 2022-12-15 VITALS
SYSTOLIC BLOOD PRESSURE: 115 MMHG | DIASTOLIC BLOOD PRESSURE: 74 MMHG | WEIGHT: 125 LBS | HEART RATE: 67 BPM | HEIGHT: 63 IN | BODY MASS INDEX: 22.15 KG/M2

## 2022-12-15 DIAGNOSIS — Z12.4 SCREENING FOR MALIGNANT NEOPLASM OF THE CERVIX: ICD-10-CM

## 2022-12-15 DIAGNOSIS — Z79.810 USE OF TAMOXIFEN (NOLVADEX): ICD-10-CM

## 2022-12-15 DIAGNOSIS — N92.6 IRREGULAR BLEEDING: ICD-10-CM

## 2022-12-15 LAB
B-HCG UR QL: NEGATIVE
CTP QC/QA: YES

## 2022-12-15 PROCEDURE — 88175 CYTOPATH C/V AUTO FLUID REDO: CPT | Performed by: OBSTETRICS & GYNECOLOGY

## 2022-12-15 PROCEDURE — 87624 HPV HI-RISK TYP POOLED RSLT: CPT | Performed by: OBSTETRICS & GYNECOLOGY

## 2022-12-15 PROCEDURE — 58100 BIOPSY OF UTERUS LINING: CPT | Mod: S$GLB,,, | Performed by: OBSTETRICS & GYNECOLOGY

## 2022-12-15 PROCEDURE — 81025 URINE PREGNANCY TEST: CPT | Mod: S$GLB,,, | Performed by: OBSTETRICS & GYNECOLOGY

## 2022-12-15 PROCEDURE — 88305 TISSUE EXAM BY PATHOLOGIST: CPT | Mod: 26,,, | Performed by: PATHOLOGY

## 2022-12-15 PROCEDURE — 88305 TISSUE EXAM BY PATHOLOGIST: CPT | Performed by: PATHOLOGY

## 2022-12-15 PROCEDURE — 81025 POCT URINE PREGNANCY: ICD-10-PCS | Mod: S$GLB,,, | Performed by: OBSTETRICS & GYNECOLOGY

## 2022-12-15 PROCEDURE — 88305 TISSUE EXAM BY PATHOLOGIST: ICD-10-PCS | Mod: 26,,, | Performed by: PATHOLOGY

## 2022-12-15 PROCEDURE — 58100 ENDOMETRIAL BIOPSY- TODAY: ICD-10-PCS | Mod: S$GLB,,, | Performed by: OBSTETRICS & GYNECOLOGY

## 2022-12-15 RX ORDER — CHOLECALCIFEROL (VITAMIN D3) 25 MCG
TABLET ORAL DAILY
COMMUNITY

## 2022-12-15 RX ORDER — TRETINOIN 0.25 MG/G
CREAM TOPICAL
COMMUNITY
Start: 2022-07-19

## 2022-12-15 RX ORDER — MULTIVITAMIN
1 TABLET ORAL DAILY
COMMUNITY

## 2022-12-15 NOTE — PROCEDURES
Endometrial Biopsy- Today    Date/Time: 12/15/2022 9:30 AM  Performed by: Mily Morris MD  Authorized by: Mily Morris MD     Consent:     Consent obtained:  Written    Consent given by:  Patient    Patient questions answered: yes      Patient agrees, verbalizes understanding, and wants to proceed: yes      Educational handouts given: yes      Instructions and paperwork completed: yes    Indication:     Indications: Menorrhagia    Pre-procedure:     Pre-procedure timeout performed: yes    Procedure:     Procedure: endometrial biopsy with Pipelle      Cervix cleaned and prepped: yes      A paracervical block was performed: no      An intracervical block was performed: no      The cervix was dilated: no      Uterus sounded: yes      Uterus sound depth (cm):  6    Specimen collected: specimen collected and sent to pathology      Patient tolerated procedure well with no complications: yes    Comments:     Procedure comments:  Minimal tissue recovered. Pap smear and HPV done as well.   Reviewed pelvic ulrrasound .  She is on Tamoxifen. She likely is not menopausal.

## 2022-12-23 LAB

## 2022-12-28 ENCOUNTER — PATIENT MESSAGE (OUTPATIENT)
Dept: OBSTETRICS AND GYNECOLOGY | Facility: CLINIC | Age: 52
End: 2022-12-28
Payer: COMMERCIAL

## 2022-12-28 LAB
FINAL PATHOLOGIC DIAGNOSIS: NORMAL
GROSS: NORMAL
Lab: NORMAL

## 2022-12-30 ENCOUNTER — TELEPHONE (OUTPATIENT)
Dept: OBSTETRICS AND GYNECOLOGY | Facility: CLINIC | Age: 52
End: 2022-12-30
Payer: COMMERCIAL

## 2022-12-30 ENCOUNTER — PATIENT MESSAGE (OUTPATIENT)
Dept: HEMATOLOGY/ONCOLOGY | Facility: CLINIC | Age: 52
End: 2022-12-30
Payer: COMMERCIAL

## 2022-12-30 NOTE — TELEPHONE ENCOUNTER
Called patient to discuss results. Discussed with Dr. Brambila. She had ASCUS and HPV negative on pap  Endometrial biopsy shows no endometrial tissue and ARNEL 1   No further evaluation of cervix is necessary  Will plan for pap and HPV in 1 year  Counseled patient that D&C/hysteroscopy is recommended  She voiced understanding  She needs PCP for clearance and just for wellness exams   Dr. Rascon at [facility] is recommended

## 2023-01-03 ENCOUNTER — PATIENT MESSAGE (OUTPATIENT)
Dept: OBSTETRICS AND GYNECOLOGY | Facility: CLINIC | Age: 53
End: 2023-01-03
Payer: COMMERCIAL

## 2023-01-03 ENCOUNTER — TELEPHONE (OUTPATIENT)
Dept: INTERNAL MEDICINE | Facility: CLINIC | Age: 53
End: 2023-01-03
Payer: COMMERCIAL

## 2023-01-03 NOTE — TELEPHONE ENCOUNTER
----- Message from Jose G Rascon MD sent at 12/30/2022  4:41 PM CST -----  Regarding: new pt    Thanks, we will figure it out.    Staff, please can pt get a pre-op appt within a week or two (prior to surgery with some time to spare for work-up) with Ms. Donato or myself and estab care appt with me within the next few months.    Thanks  JL    ----- Message -----  From: Mily Morris MD  Sent: 12/30/2022  11:42 AM CST  To: Jose G Rascon MD    Hi! I hope you are doing well.  This is a breast cancer survivor who needs a PCP. She will need clearance for a D&C/hysteroscopy for bleeding on Tamoxifen which I plan to do in January.  Thank you!  Amanda Morris

## 2023-01-04 ENCOUNTER — TELEPHONE (OUTPATIENT)
Dept: OBSTETRICS AND GYNECOLOGY | Facility: CLINIC | Age: 53
End: 2023-01-04
Payer: COMMERCIAL

## 2023-01-04 DIAGNOSIS — N92.1 MENORRHAGIA WITH IRREGULAR CYCLE: Primary | ICD-10-CM

## 2023-01-04 DIAGNOSIS — Z79.810 USE OF TAMOXIFEN (NOLVADEX): ICD-10-CM

## 2023-01-10 ENCOUNTER — PATIENT MESSAGE (OUTPATIENT)
Dept: HEMATOLOGY/ONCOLOGY | Facility: CLINIC | Age: 53
End: 2023-01-10
Payer: COMMERCIAL

## 2023-01-10 ENCOUNTER — TELEPHONE (OUTPATIENT)
Dept: OBSTETRICS AND GYNECOLOGY | Facility: CLINIC | Age: 53
End: 2023-01-10
Payer: COMMERCIAL

## 2023-01-10 DIAGNOSIS — N92.6 IRREGULAR BLEEDING: Primary | ICD-10-CM

## 2023-01-10 DIAGNOSIS — Z79.810 USE OF TAMOXIFEN (NOLVADEX): ICD-10-CM

## 2023-01-12 ENCOUNTER — HOSPITAL ENCOUNTER (OUTPATIENT)
Dept: RADIOLOGY | Facility: OTHER | Age: 53
Discharge: HOME | End: 2023-01-12
Attending: PHYSICIAN ASSISTANT
Payer: COMMERCIAL

## 2023-01-12 ENCOUNTER — OFFICE VISIT (OUTPATIENT)
Dept: INTERNAL MEDICINE | Facility: CLINIC | Age: 53
End: 2023-01-12
Payer: COMMERCIAL

## 2023-01-12 VITALS
HEART RATE: 63 BPM | DIASTOLIC BLOOD PRESSURE: 64 MMHG | WEIGHT: 125.69 LBS | OXYGEN SATURATION: 98 % | HEIGHT: 63 IN | SYSTOLIC BLOOD PRESSURE: 108 MMHG | BODY MASS INDEX: 22.27 KG/M2

## 2023-01-12 DIAGNOSIS — Z01.818 PRE-OP EVALUATION: ICD-10-CM

## 2023-01-12 DIAGNOSIS — Z85.3 HISTORY OF BREAST CANCER IN FEMALE: ICD-10-CM

## 2023-01-12 DIAGNOSIS — Z79.810 SERM USE (SELECTIVE ESTROGEN RECEPTOR MODULATOR): ICD-10-CM

## 2023-01-12 DIAGNOSIS — Z01.818 PRE-OP EVALUATION: Primary | ICD-10-CM

## 2023-01-12 DIAGNOSIS — Z98.890 HISTORY OF RECONSTRUCTION OF BOTH BREASTS: ICD-10-CM

## 2023-01-12 DIAGNOSIS — Z90.13 HISTORY OF MASTECTOMY, BILATERAL: ICD-10-CM

## 2023-01-12 PROCEDURE — 71046 X-RAY EXAM CHEST 2 VIEWS: CPT | Mod: TC,FY

## 2023-01-12 PROCEDURE — 3008F PR BODY MASS INDEX (BMI) DOCUMENTED: ICD-10-PCS | Mod: CPTII,S$GLB,, | Performed by: PHYSICIAN ASSISTANT

## 2023-01-12 PROCEDURE — 3008F BODY MASS INDEX DOCD: CPT | Mod: CPTII,S$GLB,, | Performed by: PHYSICIAN ASSISTANT

## 2023-01-12 PROCEDURE — 3074F SYST BP LT 130 MM HG: CPT | Mod: CPTII,S$GLB,, | Performed by: PHYSICIAN ASSISTANT

## 2023-01-12 PROCEDURE — 3074F PR MOST RECENT SYSTOLIC BLOOD PRESSURE < 130 MM HG: ICD-10-PCS | Mod: CPTII,S$GLB,, | Performed by: PHYSICIAN ASSISTANT

## 2023-01-12 PROCEDURE — 3078F DIAST BP <80 MM HG: CPT | Mod: CPTII,S$GLB,, | Performed by: PHYSICIAN ASSISTANT

## 2023-01-12 PROCEDURE — 1159F PR MEDICATION LIST DOCUMENTED IN MEDICAL RECORD: ICD-10-PCS | Mod: CPTII,S$GLB,, | Performed by: PHYSICIAN ASSISTANT

## 2023-01-12 PROCEDURE — 71046 XR CHEST PA AND LATERAL: ICD-10-PCS | Mod: 26,,, | Performed by: RADIOLOGY

## 2023-01-12 PROCEDURE — 99214 PR OFFICE/OUTPT VISIT, EST, LEVL IV, 30-39 MIN: ICD-10-PCS | Mod: S$GLB,,, | Performed by: PHYSICIAN ASSISTANT

## 2023-01-12 PROCEDURE — 1159F MED LIST DOCD IN RCRD: CPT | Mod: CPTII,S$GLB,, | Performed by: PHYSICIAN ASSISTANT

## 2023-01-12 PROCEDURE — 99214 OFFICE O/P EST MOD 30 MIN: CPT | Mod: S$GLB,,, | Performed by: PHYSICIAN ASSISTANT

## 2023-01-12 PROCEDURE — 3078F PR MOST RECENT DIASTOLIC BLOOD PRESSURE < 80 MM HG: ICD-10-PCS | Mod: CPTII,S$GLB,, | Performed by: PHYSICIAN ASSISTANT

## 2023-01-12 PROCEDURE — 99999 PR PBB SHADOW E&M-EST. PATIENT-LVL III: ICD-10-PCS | Mod: PBBFAC,,, | Performed by: PHYSICIAN ASSISTANT

## 2023-01-12 PROCEDURE — 99999 PR PBB SHADOW E&M-EST. PATIENT-LVL III: CPT | Mod: PBBFAC,,, | Performed by: PHYSICIAN ASSISTANT

## 2023-01-12 PROCEDURE — 71046 X-RAY EXAM CHEST 2 VIEWS: CPT | Mod: 26,,, | Performed by: RADIOLOGY

## 2023-01-12 NOTE — PROGRESS NOTES
"INTERNAL MEDICINE PROGRESS NOTE    CHIEF COMPLAINT     Chief Complaint   Patient presents with    OTHER     "PreOp Clearance"       HPI     Roberta Wheat is a 52 y.o. female who presents for a follow-up visit today.    PCP is Mily Morris MD, patient is new to me.     She presents for pre-op evaluation. She will be having D&C with Dr. Morris on 1/25/23023. Today in clinic she is feeling well, no complaints.     Cr: none  DM: none   CHF: none   MI: none   CVA: none   TIA: none   Trouble with anesthesia in the past? No trouble   Family history of sudden cardiac death? None   ADLs: able to ascend a flight of stairs with ease  Anticoagulation: none   JANNA: none   Cigarettes: none  Opioids: none   Dental implants/hardware that is removable: none     RCRI% 3.9%      Past Medical History:  Past Medical History:   Diagnosis Date    Breast cancer     Melanoma     Migraine     SERM use (selective estrogen receptor modulator)        Home Medications:  Prior to Admission medications    Medication Sig Start Date End Date Taking? Authorizing Provider   ASCORBIC ACID, VITAMIN C, ORAL Take by mouth once daily.    Historical Provider   estradioL (IMVEXXY MAINTENANCE PACK) 10 mcg Inst Place 10 mcg vaginally twice a week. 8/8/22   Mily Morris MD   multivitamin with folic acid 400 mcg Tab Take 1 tablet by mouth once daily.    Historical Provider   spironolactone (ALDACTONE) 100 MG tablet Take 100 mg by mouth once daily.  1/11/18   Historical Provider   sumatriptan (IMITREX) 100 MG tablet Take once daily as needed for migraine headaches 12/6/21   Bridger Blanchard MD   tamoxifen (NOLVADEX) 20 MG Tab TAKE 1 TABLET BY MOUTH EVERY DAY 8/16/22   Bridger Blanchard MD   tretinoin (RETIN-A) 0.025 % cream APPLY A PEA SIZED AMOUNT AT BEDTIME. 7/19/22   Historical Provider   vitamin D (VITAMIN D3) 1000 units Tab Take by mouth once daily.    Historical Provider       Review of Systems:  Review of Systems " "  Constitutional:  Negative for chills and fever.   HENT:  Negative for sore throat and trouble swallowing.    Eyes:  Negative for visual disturbance.   Respiratory:  Negative for cough and shortness of breath.    Cardiovascular:  Negative for chest pain.   Gastrointestinal:  Negative for abdominal pain, constipation, diarrhea, nausea and vomiting.   Genitourinary:  Negative for dysuria and flank pain.   Musculoskeletal:  Negative for back pain, neck pain and neck stiffness.   Skin:  Negative for rash.   Neurological:  Negative for dizziness, syncope, weakness and headaches.   Psychiatric/Behavioral:  Negative for confusion.      Health Maintainence:   Immunizations:  Health Maintenance         Date Due Completion Date    Hepatitis C Screening Never done ---    Pneumococcal Vaccines (Age 0-64) (1 - PCV) Never done ---    HIV Screening Never done ---    TETANUS VACCINE Never done ---    Shingles Vaccine (1 of 2) Never done ---    COVID-19 Vaccine (3 - Booster for Moderna series) 06/25/2021 4/30/2021    Influenza Vaccine (1) 09/01/2022 11/23/2019    Lipid Panel 07/23/2024 7/23/2019    Cervical Cancer Screening 12/15/2027 12/15/2022    Colorectal Cancer Screening 05/03/2029 5/3/2019             PHYSICAL EXAM     /64 (BP Location: Left arm, Patient Position: Sitting, BP Method: Medium (Manual))   Pulse 63   Ht 5' 3" (1.6 m)   Wt 57 kg (125 lb 10.6 oz)   SpO2 98%   BMI 22.26 kg/m²     Physical Exam  Vitals and nursing note reviewed.   Constitutional:       Appearance: Normal appearance.      Comments: Healthy appearing female in NAD or apparent pain. She makes good eye contact, speaks in clear full sentences and ambulates with ease.        HENT:      Head: Normocephalic and atraumatic.      Nose: Nose normal.      Mouth/Throat:      Pharynx: Oropharynx is clear.   Eyes:      Conjunctiva/sclera: Conjunctivae normal.   Cardiovascular:      Rate and Rhythm: Normal rate and regular rhythm.      Pulses: Normal " pulses.   Pulmonary:      Effort: No respiratory distress.   Abdominal:      Tenderness: There is no abdominal tenderness.   Musculoskeletal:         General: Normal range of motion.      Cervical back: No rigidity.   Skin:     General: Skin is warm and dry.      Capillary Refill: Capillary refill takes less than 2 seconds.      Findings: No rash.   Neurological:      General: No focal deficit present.      Mental Status: She is alert.      Gait: Gait normal.   Psychiatric:         Mood and Affect: Mood normal.       LABS     No results found for: LABA1C, HGBA1C  CMP  Sodium   Date Value Ref Range Status   07/23/2019 140 136 - 145 mmol/L Final     Potassium   Date Value Ref Range Status   07/23/2019 4.3 3.5 - 5.1 mmol/L Final     Chloride   Date Value Ref Range Status   07/23/2019 104 95 - 110 mmol/L Final     CO2   Date Value Ref Range Status   07/23/2019 29 23 - 29 mmol/L Final     Glucose   Date Value Ref Range Status   07/23/2019 95 70 - 110 mg/dL Final     BUN   Date Value Ref Range Status   07/23/2019 15 6 - 20 mg/dL Final     Creatinine   Date Value Ref Range Status   07/23/2019 0.8 0.5 - 1.4 mg/dL Final     Calcium   Date Value Ref Range Status   07/23/2019 9.9 8.7 - 10.5 mg/dL Final     Total Protein   Date Value Ref Range Status   07/23/2019 7.1 6.0 - 8.4 g/dL Final     Albumin   Date Value Ref Range Status   07/23/2019 4.3 3.5 - 5.2 g/dL Final     Total Bilirubin   Date Value Ref Range Status   07/23/2019 0.5 0.1 - 1.0 mg/dL Final     Comment:     For infants and newborns, interpretation of results should be based  on gestational age, weight and in agreement with clinical  observations.  Premature Infant recommended reference ranges:  Up to 24 hours.............<8.0 mg/dL  Up to 48 hours............<12.0 mg/dL  3-5 days..................<15.0 mg/dL  6-29 days.................<15.0 mg/dL       Alkaline Phosphatase   Date Value Ref Range Status   07/23/2019 47 (L) 55 - 135 U/L Final     AST   Date Value Ref  Range Status   07/23/2019 17 10 - 40 U/L Final     ALT   Date Value Ref Range Status   07/23/2019 14 10 - 44 U/L Final     Anion Gap   Date Value Ref Range Status   07/23/2019 7 (L) 8 - 16 mmol/L Final     eGFR if    Date Value Ref Range Status   07/23/2019 >60.0 >60 mL/min/1.73 m^2 Final     eGFR if non    Date Value Ref Range Status   07/23/2019 >60.0 >60 mL/min/1.73 m^2 Final     Comment:     Calculation used to obtain the estimated glomerular filtration  rate (eGFR) is the CKD-EPI equation.        Lab Results   Component Value Date    WBC 5.31 07/23/2019    HGB 14.3 07/23/2019    HCT 45.0 07/23/2019    MCV 98 07/23/2019     07/23/2019     Lab Results   Component Value Date    CHOL 187 07/23/2019     Lab Results   Component Value Date    HDL 79 (H) 07/23/2019     Lab Results   Component Value Date    LDLCALC 97.4 07/23/2019     Lab Results   Component Value Date    TRIG 53 07/23/2019     Lab Results   Component Value Date    CHOLHDL 42.2 07/23/2019     Lab Results   Component Value Date    TSH 2.717 07/23/2019       ASSESSMENT/PLAN     Roberta Wheat is a 52 y.o. female     Roberta Jiménez was seen today for pre-op evaluation. CXR and EKG with labs are ordered. I expect that she will be medically optimized once these results have been reviewed. She will have an RCRI of 3.9%.     Diagnoses and all orders for this visit:    Pre-op evaluation  -     CBC Auto Differential; Future  -     Comprehensive Metabolic Panel; Future  -     Hemoglobin A1C; Future  -     TSH; Future  -     Lipid Panel; Future  -     EKG 12-lead; Future  -     X-Ray Chest PA And Lateral; Future  -     HEPATITIS C ANTIBODY; Future  -     HIV 1/2 Ag/Ab (4th Gen); Future    History of breast cancer in female    SERM use (selective estrogen receptor modulator)    History of mastectomy, bilateral    History of reconstruction of both breasts    RTC for annual exam with PCP -Dr. Rascon to establish care.      Keesha Donato PA-C   Department of Internal Medicine - [facility]   10:19 AM

## 2023-01-18 ENCOUNTER — OFFICE VISIT (OUTPATIENT)
Dept: OBSTETRICS AND GYNECOLOGY | Facility: CLINIC | Age: 53
End: 2023-01-18
Attending: OBSTETRICS & GYNECOLOGY
Payer: COMMERCIAL

## 2023-01-18 VITALS
HEIGHT: 63 IN | HEART RATE: 64 BPM | DIASTOLIC BLOOD PRESSURE: 68 MMHG | WEIGHT: 125 LBS | BODY MASS INDEX: 22.15 KG/M2 | SYSTOLIC BLOOD PRESSURE: 103 MMHG

## 2023-01-18 DIAGNOSIS — Z79.810 USE OF TAMOXIFEN (NOLVADEX): Primary | ICD-10-CM

## 2023-01-18 DIAGNOSIS — Z85.3 HISTORY OF BREAST CANCER IN FEMALE: ICD-10-CM

## 2023-01-18 PROCEDURE — 99999 PR PBB SHADOW E&M-EST. PATIENT-LVL III: ICD-10-PCS | Mod: PBBFAC,,, | Performed by: OBSTETRICS & GYNECOLOGY

## 2023-01-18 PROCEDURE — 3044F HG A1C LEVEL LT 7.0%: CPT | Mod: CPTII,S$GLB,, | Performed by: OBSTETRICS & GYNECOLOGY

## 2023-01-18 PROCEDURE — 3008F PR BODY MASS INDEX (BMI) DOCUMENTED: ICD-10-PCS | Mod: CPTII,S$GLB,, | Performed by: OBSTETRICS & GYNECOLOGY

## 2023-01-18 PROCEDURE — 1159F PR MEDICATION LIST DOCUMENTED IN MEDICAL RECORD: ICD-10-PCS | Mod: CPTII,S$GLB,, | Performed by: OBSTETRICS & GYNECOLOGY

## 2023-01-18 PROCEDURE — 3078F DIAST BP <80 MM HG: CPT | Mod: CPTII,S$GLB,, | Performed by: OBSTETRICS & GYNECOLOGY

## 2023-01-18 PROCEDURE — 3074F SYST BP LT 130 MM HG: CPT | Mod: CPTII,S$GLB,, | Performed by: OBSTETRICS & GYNECOLOGY

## 2023-01-18 PROCEDURE — 99999 PR PBB SHADOW E&M-EST. PATIENT-LVL III: CPT | Mod: PBBFAC,,, | Performed by: OBSTETRICS & GYNECOLOGY

## 2023-01-18 PROCEDURE — 3008F BODY MASS INDEX DOCD: CPT | Mod: CPTII,S$GLB,, | Performed by: OBSTETRICS & GYNECOLOGY

## 2023-01-18 PROCEDURE — 3044F PR MOST RECENT HEMOGLOBIN A1C LEVEL <7.0%: ICD-10-PCS | Mod: CPTII,S$GLB,, | Performed by: OBSTETRICS & GYNECOLOGY

## 2023-01-18 PROCEDURE — 3074F PR MOST RECENT SYSTOLIC BLOOD PRESSURE < 130 MM HG: ICD-10-PCS | Mod: CPTII,S$GLB,, | Performed by: OBSTETRICS & GYNECOLOGY

## 2023-01-18 PROCEDURE — 99214 OFFICE O/P EST MOD 30 MIN: CPT | Mod: S$GLB,,, | Performed by: OBSTETRICS & GYNECOLOGY

## 2023-01-18 PROCEDURE — 1159F MED LIST DOCD IN RCRD: CPT | Mod: CPTII,S$GLB,, | Performed by: OBSTETRICS & GYNECOLOGY

## 2023-01-18 PROCEDURE — 3078F PR MOST RECENT DIASTOLIC BLOOD PRESSURE < 80 MM HG: ICD-10-PCS | Mod: CPTII,S$GLB,, | Performed by: OBSTETRICS & GYNECOLOGY

## 2023-01-18 PROCEDURE — 99214 PR OFFICE/OUTPT VISIT, EST, LEVL IV, 30-39 MIN: ICD-10-PCS | Mod: S$GLB,,, | Performed by: OBSTETRICS & GYNECOLOGY

## 2023-01-18 RX ORDER — MELOXICAM 7.5 MG/1
7.5 TABLET ORAL 2 TIMES DAILY
COMMUNITY
Start: 2022-12-28

## 2023-01-18 RX ORDER — HYDROCODONE BITARTRATE AND ACETAMINOPHEN 5; 325 MG/1; MG/1
1 TABLET ORAL EVERY 6 HOURS PRN
Qty: 8 TABLET | Refills: 0 | Status: SHIPPED | OUTPATIENT
Start: 2023-01-18 | End: 2023-01-20 | Stop reason: CLARIF

## 2023-01-18 RX ORDER — SODIUM CHLORIDE 9 MG/ML
INJECTION, SOLUTION INTRAVENOUS CONTINUOUS
Status: CANCELLED | OUTPATIENT
Start: 2023-01-18

## 2023-01-18 NOTE — PROGRESS NOTES
"            History & Physical            OBGYN    C.C Pre-op Exam      HPI : Roberta Wheat is a 52 y.o. female  for evaluation and discussion of treatment options for Pre-op Exam  She had  episode of bleeding   She has breast cancer and is on Tamoxifen.     Past Medical History:   Diagnosis Date    Breast cancer     Melanoma     Migraine     SERM use (selective estrogen receptor modulator)      Past Surgical History:   Procedure Laterality Date    BREAST BIOPSY      BREAST RECONSTRUCTION       and second      SECTION      triplets    COLONOSCOPY N/A 5/3/2019    Procedure: COLONOSCOPY;  Surgeon: Jonah Alarcon MD;  Location: [facility] ([address] [place] [address]);  Service: Endoscopy;  Laterality: N/A;    DENTAL SURGERY      INSERTION OF BREAST IMPLANT      MASTECTOMY  2018    bilateral skin sparing mastectomy, right axillary sentinal lymph node biopsy, injection of technetium sulfur colliod into right breast     melanoma removed from forehead      TONSILLECTOMY       Family History   Problem Relation Age of Onset    Breast cancer Maternal Grandmother         great granmother     Cancer Neg Hx     Colon cancer Neg Hx     Ovarian cancer Neg Hx      Social History     Tobacco Use    Smoking status: Never    Smokeless tobacco: Never   Substance Use Topics    Alcohol use: Yes     Alcohol/week: 2.0 - 3.0 standard drinks     Types: 2 - 3 Glasses of wine per week     Comment: weekend    Drug use: No     OB History    Para Term  AB Living   2 1 1   1     SAB IAB Ectopic Multiple Live Births   1     1        # Outcome Date GA Lbr Mike/2nd Weight Sex Delivery Anes PTL Lv   2 SAB            1A Term      CS-LTranv      1B Term      CS-LTranv      1C Term      CS-LTranv          /68   Pulse 64   Ht 5' 3" (1.6 m)   Wt 56.7 kg (125 lb)   BMI 22.14 kg/m²     ROS:    GENERAL: Denies weight gain or weight loss. Feeling well overall.   SKIN: Denies rash or lesions.   HEAD: Denies head " injury or headache.   NODES: Denies enlarged lymph nodes.   CHEST: Denies chest pain or shortness of breath.   CARDIOVASCULAR: Denies palpitations or left sided chest pain.   ABDOMEN: No abdominal pain, constipation, diarrhea, nausea, vomiting or rectal bleeding.   URINARY: No frequency, dysuria, hematuria, or burning on urination.  REPRODUCTIVE: See HPI.   BREASTS: The patient performs breast self-examination and denies pain, lumps, or nipple discharge.   HEMATOLOGIC: No easy bruisability or excessive bleeding with the exception of menstrual cycles.  MUSCULOSKELETAL: Denies joint pain or swelling.   NEUROLOGIC: Denies syncope or weakness.   PSYCHIATRIC: Denies depression, anxiety or mood swings.    PHYSICAL EXAM:    APPEARANCE: Well nourished, well developed, in no acute distress.  AFFECT: WNL, alert and oriented x 3  SKIN: No acne or hirsutism  NECK: Neck symmetric without masses or thyromegaly  NODES: No inguinal, cervical, axillary, or femoral lymph node enlargement  CHEST: Good respiratory effect  ABDOMEN: Soft.  No tenderness or masses.  No hepatosplenomegaly.  No hernias.  PELVIC: Normal external genitalia without lesions.  Normal hair distribution.  Adequate perineal body, normal urethral meatus.  Vagina moist and well rugated without lesions or discharge.  Cervix pink, without lesions, discharge or tenderness.  No significant cystocele or rectocele.  Bimanual exam shows uterus to be normal size, regular, mobile and nontender.  Adnexa without masses or tenderness.    EXTREMITIES: No edema.    FINDINGS:  Uterus:     Size: 9.1 x 7.9 x 7.7 cm     Masses: Heterogeneous areas suggesting fibroids with three measured, largest at 2.9 x 2.2 x 3.1 cm.     Endometrium: Resultant obscuration with limited assessment of the endometrial stripe.     Right ovary:     Size: 2.4 x 1.6 x 2.0 cm     Appearance: Dominant follicle or cyst measuring up to 1.2 cm.     Vascular flow: Present     Left ovary:     Size: 1.6 x 1.0 x 1.3  cm     Appearance: Normal     Vascular Flow: Present     Free Fluid:     Small volume intrapelvic free fluid, nonspecific and possibly physiologic.     Impression:     Fibroid uterus.  Resultant obscuration with limited assessment of the endometrial stripe.   ASSESSMENT & PLAN:    1. Use of tamoxifen (Nolvadex)      2. History of breast cancer in female        I have discussed the risks, benefits, indications, and alternatives of the procedure in detail.  The patient verbalizes her understanding.  All questions answered.  Consents signed.  The patient agrees to proceed to proceed as planned.

## 2023-01-18 NOTE — H&P (VIEW-ONLY)
"            History & Physical            OBGYN    C.C Pre-op Exam      HPI : Roberta Wheat is a 52 y.o. female  for evaluation and discussion of treatment options for Pre-op Exam  She had  episode of bleeding   She has breast cancer and is on Tamoxifen.     Past Medical History:   Diagnosis Date    Breast cancer     Melanoma     Migraine     SERM use (selective estrogen receptor modulator)      Past Surgical History:   Procedure Laterality Date    BREAST BIOPSY      BREAST RECONSTRUCTION       and second      SECTION      triplets    COLONOSCOPY N/A 5/3/2019    Procedure: COLONOSCOPY;  Surgeon: Jonah Alarcon MD;  Location: [facility] ([address] [place] [address]);  Service: Endoscopy;  Laterality: N/A;    DENTAL SURGERY      INSERTION OF BREAST IMPLANT      MASTECTOMY  2018    bilateral skin sparing mastectomy, right axillary sentinal lymph node biopsy, injection of technetium sulfur colliod into right breast     melanoma removed from forehead      TONSILLECTOMY       Family History   Problem Relation Age of Onset    Breast cancer Maternal Grandmother         great granmother     Cancer Neg Hx     Colon cancer Neg Hx     Ovarian cancer Neg Hx      Social History     Tobacco Use    Smoking status: Never    Smokeless tobacco: Never   Substance Use Topics    Alcohol use: Yes     Alcohol/week: 2.0 - 3.0 standard drinks     Types: 2 - 3 Glasses of wine per week     Comment: weekend    Drug use: No     OB History    Para Term  AB Living   2 1 1   1     SAB IAB Ectopic Multiple Live Births   1     1        # Outcome Date GA Lbr Mike/2nd Weight Sex Delivery Anes PTL Lv   2 SAB            1A Term      CS-LTranv      1B Term      CS-LTranv      1C Term      CS-LTranv          /68   Pulse 64   Ht 5' 3" (1.6 m)   Wt 56.7 kg (125 lb)   BMI 22.14 kg/m²     ROS:    GENERAL: Denies weight gain or weight loss. Feeling well overall.   SKIN: Denies rash or lesions.   HEAD: Denies head " injury or headache.   NODES: Denies enlarged lymph nodes.   CHEST: Denies chest pain or shortness of breath.   CARDIOVASCULAR: Denies palpitations or left sided chest pain.   ABDOMEN: No abdominal pain, constipation, diarrhea, nausea, vomiting or rectal bleeding.   URINARY: No frequency, dysuria, hematuria, or burning on urination.  REPRODUCTIVE: See HPI.   BREASTS: The patient performs breast self-examination and denies pain, lumps, or nipple discharge.   HEMATOLOGIC: No easy bruisability or excessive bleeding with the exception of menstrual cycles.  MUSCULOSKELETAL: Denies joint pain or swelling.   NEUROLOGIC: Denies syncope or weakness.   PSYCHIATRIC: Denies depression, anxiety or mood swings.    PHYSICAL EXAM:    APPEARANCE: Well nourished, well developed, in no acute distress.  AFFECT: WNL, alert and oriented x 3  SKIN: No acne or hirsutism  NECK: Neck symmetric without masses or thyromegaly  NODES: No inguinal, cervical, axillary, or femoral lymph node enlargement  CHEST: Good respiratory effect  ABDOMEN: Soft.  No tenderness or masses.  No hepatosplenomegaly.  No hernias.  PELVIC: Normal external genitalia without lesions.  Normal hair distribution.  Adequate perineal body, normal urethral meatus.  Vagina moist and well rugated without lesions or discharge.  Cervix pink, without lesions, discharge or tenderness.  No significant cystocele or rectocele.  Bimanual exam shows uterus to be normal size, regular, mobile and nontender.  Adnexa without masses or tenderness.    EXTREMITIES: No edema.    FINDINGS:  Uterus:     Size: 9.1 x 7.9 x 7.7 cm     Masses: Heterogeneous areas suggesting fibroids with three measured, largest at 2.9 x 2.2 x 3.1 cm.     Endometrium: Resultant obscuration with limited assessment of the endometrial stripe.     Right ovary:     Size: 2.4 x 1.6 x 2.0 cm     Appearance: Dominant follicle or cyst measuring up to 1.2 cm.     Vascular flow: Present     Left ovary:     Size: 1.6 x 1.0 x 1.3  cm     Appearance: Normal     Vascular Flow: Present     Free Fluid:     Small volume intrapelvic free fluid, nonspecific and possibly physiologic.     Impression:     Fibroid uterus.  Resultant obscuration with limited assessment of the endometrial stripe.   ASSESSMENT & PLAN:    1. Use of tamoxifen (Nolvadex)      2. History of breast cancer in female        I have discussed the risks, benefits, indications, and alternatives of the procedure in detail.  The patient verbalizes her understanding.  All questions answered.  Consents signed.  The patient agrees to proceed to proceed as planned.

## 2023-01-19 ENCOUNTER — HOSPITAL ENCOUNTER (OUTPATIENT)
Dept: CARDIOLOGY | Facility: OTHER | Age: 53
Discharge: HOME | End: 2023-01-19
Attending: OBSTETRICS & GYNECOLOGY
Payer: COMMERCIAL

## 2023-01-19 DIAGNOSIS — Z01.818 PRE-OP EVALUATION: ICD-10-CM

## 2023-01-19 PROCEDURE — 93010 EKG 12-LEAD: ICD-10-PCS | Mod: ,,, | Performed by: INTERNAL MEDICINE

## 2023-01-19 PROCEDURE — 93010 ELECTROCARDIOGRAM REPORT: CPT | Mod: ,,, | Performed by: INTERNAL MEDICINE

## 2023-01-19 PROCEDURE — 93005 ELECTROCARDIOGRAM TRACING: CPT

## 2023-01-20 ENCOUNTER — ANESTHESIA EVENT (OUTPATIENT)
Dept: SURGERY | Facility: OTHER | Age: 53
End: 2023-01-20
Payer: COMMERCIAL

## 2023-01-20 ENCOUNTER — HOSPITAL ENCOUNTER (OUTPATIENT)
Dept: PREADMISSION TESTING | Facility: OTHER | Age: 53
Discharge: HOME | End: 2023-01-20
Attending: OBSTETRICS & GYNECOLOGY
Payer: COMMERCIAL

## 2023-01-20 VITALS
OXYGEN SATURATION: 100 % | SYSTOLIC BLOOD PRESSURE: 114 MMHG | HEIGHT: 63 IN | RESPIRATION RATE: 18 BRPM | TEMPERATURE: 98 F | BODY MASS INDEX: 22.15 KG/M2 | WEIGHT: 125 LBS | HEART RATE: 72 BPM | DIASTOLIC BLOOD PRESSURE: 64 MMHG

## 2023-01-20 RX ORDER — SODIUM CHLORIDE, SODIUM LACTATE, POTASSIUM CHLORIDE, CALCIUM CHLORIDE 600; 310; 30; 20 MG/100ML; MG/100ML; MG/100ML; MG/100ML
INJECTION, SOLUTION INTRAVENOUS CONTINUOUS
Status: CANCELLED | OUTPATIENT
Start: 2023-01-20

## 2023-01-20 RX ORDER — LIDOCAINE HYDROCHLORIDE 10 MG/ML
0.5 INJECTION, SOLUTION EPIDURAL; INFILTRATION; INTRACAUDAL; PERINEURAL ONCE
Status: CANCELLED | OUTPATIENT
Start: 2023-01-20 | End: 2023-01-20

## 2023-01-20 RX ORDER — SCOLOPAMINE TRANSDERMAL SYSTEM 1 MG/1
1 PATCH, EXTENDED RELEASE TRANSDERMAL ONCE
Status: CANCELLED | OUTPATIENT
Start: 2023-01-20 | End: 2023-01-20

## 2023-01-20 RX ORDER — ACETAMINOPHEN 500 MG
1000 TABLET ORAL
Status: CANCELLED | OUTPATIENT
Start: 2023-01-20 | End: 2023-01-20

## 2023-01-20 NOTE — DISCHARGE INSTRUCTIONS
Information to Prepare you for your Surgery    PRE-ADMIT TESTING -  [phone]    [facility]          Your surgery has been scheduled at [facility]. We are pleased to have the opportunity to serve you. For Further Information please call [phone].    On the day of surgery please report to the Information Desk on the 1st floor.    CONTACT YOUR PHYSICIAN'S OFFICE THE DAY PRIOR TO YOUR SURGERY TO OBTAIN YOUR ARRIVAL TIME.     The evening before surgery do not eat anything after 9 p.m. ( this includes hard candy, chewing gum and mints).  You may only have GATORADE, POWERADE AND WATER  from 9 p.m. until you leave your home.   DO NOT DRINK ANY LIQUIDS ON THE WAY TO THE HOSPITAL.      Why does your anesthesiologist allow you to drink Gatorade/Powerade before surgery?  Gatorade/Powerade helps to increase your comfort before surgery and to decrease your nausea after surgery. The carbohydrates in Gatorade/Powerade help reduce your body's stress response to surgery.  If you are a diabetic-drink only water prior to surgery.      Current Visitor policy(12/27/2021) - Patients may have 2 visitors pre and post procedure. Only 2 visitors will be allowed in the Surgical building with the patient. No one under the age of 12 will be allowed into the facility.    SPECIAL MEDICATION INSTRUCTIONS: TAKE medications checked off by the Anesthesiologist on your Medication List.    Angiogram Patients: Take medications as instructed by your physician, including aspirin.     Surgery Patients:    If you take ASPIRIN - Your PHYSICIAN/SURGEON will need to inform you IF/OR when you need to stop taking aspirin prior to your surgery.     The week prior to surgery do not ot take any medications containing IBUPROFEN or NSAIDS ( Advil, Motrin, Goodys, BC, Aleve, Naproxen etc) If you are not sure if you should take a medicine please call your surgeon's office.  Ok to take  Tylenol    Do Not Wear any make-up (especially eye make-up) to surgery. Please remove any false eyelashes or eyelash extensions. If you arrive the day of surgery with makeup/eyelashes on you will be required to remove prior to surgery. (There is a risk of corneal abrasions if eye makeup/eyelash extensions are not removed)      Leave all valuables at home.   Do Not wear any jewelry or watches, including any metal in body piercings. Jewelry must be removed prior to coming to the hospital.  There is a possibility that rings that are unable to be removed may be cut off if they are on the surgical extremity.    Please remove all hair extensions, wigs, clips and any other metal accessories/ ornaments from your hair.  These items may pose a flammable/fire risk in Surgery and must be removed.    Do not shave your surgical area at least 5 days prior to your surgery. The surgical prep will be performed at the hospital according to Infection Control regulations.    Contact Lens must be removed before surgery. Either do not wear the contact lens or bring a case and solution for storage.  Please bring a container for eyeglasses or dentures as required.  Bring any paperwork your physician has provided, such as consent forms,  history and physicals, doctor's orders, etc.   Bring comfortable clothes that are loose fitting to wear upon discharge. Take into consideration the type of surgery being performed.  Maintain your diet as advised per your physician the day prior to surgery.      Adequate rest the night before surgery is advised.   Park in the Parking lot behind the hospital or in the [facility] across the street from the parking lot. Parking is complimentary.  If you will be discharged the same day as your procedure, please arrange for a responsible adult to drive you home or to accompany you if traveling by taxi.   YOU WILL NOT BE PERMITTED TO DRIVE OR TO LEAVE THE HOSPITAL ALONE AFTER SURGERY.   If you are  being discharged the same day, it is strongly recommended that you arrange for someone to remain with you for the first 24 hrs following your surgery.    The Surgeon will speak to your family/visitor after your surgery regarding the outcome of your surgery and post op care.  The Surgeon may speak to you after your surgery, but there is a possibility you may not remember the details.  Please check with your family members regarding the conversation with the Surgeon.    We strongly recommend whoever is bringing you home be present for discharge instructions.  This will ensure a thorough understanding for your post op home care.    ALL CHILDREN MUST ALWAYS BE ACCOMPANIED BY AN ADULT.    Visitors-Refer to current Visitor policy handouts.    Thank you for your cooperation.  The Staff of [facility].            Bathing Instructions with Hibiclens    Shower the evening before and morning of your procedure with Chlorhexidine (Hibiclens)  do not use Chlorhexidine on your face or genitals. Do not get in your eyes.  Wash your face with water and your regular face wash/soap  Use your regular shampoo  Apply Chlorhexidine (Hibiclens) directly on your skin or on a wet washcloth and wash gently. When showering: Move away from the shower stream when applying Chlorhexidine (Hibiclens) to avoid rinsing off too soon.  Rinse thoroughly with warm water  Do not dilute Chlorhexidine (Hibiclens)   Dry off as usual, do not use any deodorant, powder, body lotions, perfume, after shave or cologne.

## 2023-01-20 NOTE — ANESTHESIA PREPROCEDURE EVALUATION
01/20/2023  Roberta Wheat is a 52 y.o., female.      Pre-op Assessment    I have reviewed the Patient Summary Reports.     I have reviewed the Nursing Notes. I have reviewed the NPO Status.   I have reviewed the Medications.     Review of Systems  Anesthesia Hx:  Denies Family Hx of Anesthesia complications.  Personal Hx of Anesthesia complications, Post-Operative Nausea/Vomiting   Social:  Non-Smoker    Hematology/Oncology:  Hematology Normal       -- Cancer in past history (2018):  Breast right Oncology Comments: Melanoma 3/21     EENT/Dental:EENT/Dental Normal   Cardiovascular:  Cardiovascular Normal     Pulmonary:  Pulmonary Normal    Renal/:  Renal/ Normal     Hepatic/GI:  Hepatic/GI Normal    Musculoskeletal:  Musculoskeletal Normal    Neurological:   Headaches (rare migraines)    Endocrine:  Endocrine Normal    Dermatological:  Skin Normal Spironolactone   Psych:  Psychiatric Normal           Physical Exam  General: Well nourished, Cooperative, Alert and Oriented    Airway:  Mallampati: II   Mouth Opening: Normal  TM Distance: Normal  Neck ROM: Normal ROM    Dental:  Intact, Retainer        Anesthesia Plan  Type of Anesthesia, risks & benefits discussed:    Anesthesia Type: Gen Supraglottic Airway  Intra-op Monitoring Plan: Standard ASA Monitors  Post Op Pain Control Plan: multimodal analgesia and IV/PO Opioids PRN  Induction:  IV  Informed Consent: Informed consent signed with the Patient and all parties understand the risks and agree with anesthesia plan.  All questions answered.   ASA Score: 2  Anesthesia Plan Notes: L side devices    EKG/labs in [facility]    Ready For Surgery From Anesthesia Perspective.     .

## 2023-01-25 ENCOUNTER — ANESTHESIA (OUTPATIENT)
Dept: SURGERY | Facility: OTHER | Age: 53
End: 2023-01-25
Payer: COMMERCIAL

## 2023-01-25 ENCOUNTER — HOSPITAL ENCOUNTER (OUTPATIENT)
Facility: OTHER | Age: 53
Discharge: HOME | End: 2023-01-25
Attending: OBSTETRICS & GYNECOLOGY | Admitting: OBSTETRICS & GYNECOLOGY
Payer: COMMERCIAL

## 2023-01-25 DIAGNOSIS — Z79.810 USE OF TAMOXIFEN (NOLVADEX): ICD-10-CM

## 2023-01-25 DIAGNOSIS — Z98.890 STATUS POST HYSTEROSCOPY: Primary | ICD-10-CM

## 2023-01-25 DIAGNOSIS — Z85.3 HISTORY OF BREAST CANCER IN FEMALE: ICD-10-CM

## 2023-01-25 LAB
B-HCG UR QL: NEGATIVE
CTP QC/QA: YES

## 2023-01-25 PROCEDURE — 58558 PR HYSTEROSCOPY,W/ENDO BX: ICD-10-PCS | Mod: ,,, | Performed by: OBSTETRICS & GYNECOLOGY

## 2023-01-25 PROCEDURE — 63600175 PHARM REV CODE 636 W HCPCS: Performed by: NURSE ANESTHETIST, CERTIFIED REGISTERED

## 2023-01-25 PROCEDURE — 63600175 PHARM REV CODE 636 W HCPCS: Performed by: OBSTETRICS & GYNECOLOGY

## 2023-01-25 PROCEDURE — 81025 URINE PREGNANCY TEST: CPT | Performed by: ANESTHESIOLOGY

## 2023-01-25 PROCEDURE — 88305 TISSUE EXAM BY PATHOLOGIST: ICD-10-PCS | Mod: 26,,, | Performed by: PATHOLOGY

## 2023-01-25 PROCEDURE — 58558 HYSTEROSCOPY BIOPSY: CPT | Mod: ,,, | Performed by: OBSTETRICS & GYNECOLOGY

## 2023-01-25 PROCEDURE — 37000009 HC ANESTHESIA EA ADD 15 MINS: Performed by: OBSTETRICS & GYNECOLOGY

## 2023-01-25 PROCEDURE — 25000003 PHARM REV CODE 250: Performed by: ANESTHESIOLOGY

## 2023-01-25 PROCEDURE — C1782 MORCELLATOR: HCPCS | Performed by: OBSTETRICS & GYNECOLOGY

## 2023-01-25 PROCEDURE — 27201423 OPTIME MED/SURG SUP & DEVICES STERILE SUPPLY: Performed by: OBSTETRICS & GYNECOLOGY

## 2023-01-25 PROCEDURE — 88307 PR  SURG PATH,LEVEL V: ICD-10-PCS | Mod: 26,,, | Performed by: PATHOLOGY

## 2023-01-25 PROCEDURE — 71000039 HC RECOVERY, EACH ADD'L HOUR: Performed by: OBSTETRICS & GYNECOLOGY

## 2023-01-25 PROCEDURE — 25000003 PHARM REV CODE 250: Performed by: NURSE ANESTHETIST, CERTIFIED REGISTERED

## 2023-01-25 PROCEDURE — 36000706: Performed by: OBSTETRICS & GYNECOLOGY

## 2023-01-25 PROCEDURE — 71000033 HC RECOVERY, INTIAL HOUR: Performed by: OBSTETRICS & GYNECOLOGY

## 2023-01-25 PROCEDURE — 63600175 PHARM REV CODE 636 W HCPCS: Performed by: ANESTHESIOLOGY

## 2023-01-25 PROCEDURE — 88305 TISSUE EXAM BY PATHOLOGIST: CPT | Mod: 26,,, | Performed by: PATHOLOGY

## 2023-01-25 PROCEDURE — 37000008 HC ANESTHESIA 1ST 15 MINUTES: Performed by: OBSTETRICS & GYNECOLOGY

## 2023-01-25 PROCEDURE — 88305 TISSUE EXAM BY PATHOLOGIST: CPT | Performed by: PATHOLOGY

## 2023-01-25 PROCEDURE — 88307 TISSUE EXAM BY PATHOLOGIST: CPT | Performed by: PATHOLOGY

## 2023-01-25 PROCEDURE — 88307 TISSUE EXAM BY PATHOLOGIST: CPT | Mod: 26,,, | Performed by: PATHOLOGY

## 2023-01-25 PROCEDURE — 36000707: Performed by: OBSTETRICS & GYNECOLOGY

## 2023-01-25 PROCEDURE — 71000015 HC POSTOP RECOV 1ST HR: Performed by: OBSTETRICS & GYNECOLOGY

## 2023-01-25 RX ORDER — OXYCODONE HYDROCHLORIDE 5 MG/1
5 TABLET ORAL
Status: DISCONTINUED | OUTPATIENT
Start: 2023-01-25 | End: 2023-01-25 | Stop reason: HOSPADM

## 2023-01-25 RX ORDER — ONDANSETRON 8 MG/1
8 TABLET, ORALLY DISINTEGRATING ORAL EVERY 8 HOURS PRN
Status: DISCONTINUED | OUTPATIENT
Start: 2023-01-25 | End: 2023-01-25 | Stop reason: HOSPADM

## 2023-01-25 RX ORDER — MEPERIDINE HYDROCHLORIDE 25 MG/ML
12.5 INJECTION INTRAMUSCULAR; INTRAVENOUS; SUBCUTANEOUS ONCE AS NEEDED
Status: DISCONTINUED | OUTPATIENT
Start: 2023-01-25 | End: 2023-01-25 | Stop reason: HOSPADM

## 2023-01-25 RX ORDER — DIPHENHYDRAMINE HYDROCHLORIDE 50 MG/ML
25 INJECTION INTRAMUSCULAR; INTRAVENOUS EVERY 4 HOURS PRN
Status: CANCELLED | OUTPATIENT
Start: 2023-01-25

## 2023-01-25 RX ORDER — PROPOFOL 10 MG/ML
VIAL (ML) INTRAVENOUS
Status: DISCONTINUED | OUTPATIENT
Start: 2023-01-25 | End: 2023-01-25

## 2023-01-25 RX ORDER — LIDOCAINE HYDROCHLORIDE 10 MG/ML
0.5 INJECTION, SOLUTION EPIDURAL; INFILTRATION; INTRACAUDAL; PERINEURAL ONCE
Status: DISCONTINUED | OUTPATIENT
Start: 2023-01-25 | End: 2023-01-25 | Stop reason: HOSPADM

## 2023-01-25 RX ORDER — PHENYLEPHRINE HYDROCHLORIDE 10 MG/ML
INJECTION INTRAVENOUS
Status: DISCONTINUED | OUTPATIENT
Start: 2023-01-25 | End: 2023-01-25

## 2023-01-25 RX ORDER — PROCHLORPERAZINE EDISYLATE 5 MG/ML
5 INJECTION INTRAMUSCULAR; INTRAVENOUS EVERY 6 HOURS PRN
Status: DISCONTINUED | OUTPATIENT
Start: 2023-01-25 | End: 2023-01-25 | Stop reason: HOSPADM

## 2023-01-25 RX ORDER — KETOROLAC TROMETHAMINE 30 MG/ML
30 INJECTION, SOLUTION INTRAMUSCULAR; INTRAVENOUS ONCE
Status: COMPLETED | OUTPATIENT
Start: 2023-01-25 | End: 2023-01-25

## 2023-01-25 RX ORDER — LIDOCAINE HCL/PF 100 MG/5ML
SYRINGE (ML) INTRAVENOUS
Status: DISCONTINUED | OUTPATIENT
Start: 2023-01-25 | End: 2023-01-25

## 2023-01-25 RX ORDER — IBUPROFEN 600 MG/1
600 TABLET ORAL 3 TIMES DAILY
Qty: 30 TABLET | Refills: 0 | Status: SHIPPED | OUTPATIENT
Start: 2023-01-25 | End: 2024-02-19

## 2023-01-25 RX ORDER — HYDROCODONE BITARTRATE AND ACETAMINOPHEN 5; 325 MG/1; MG/1
1 TABLET ORAL EVERY 4 HOURS PRN
Status: CANCELLED | OUTPATIENT
Start: 2023-01-25

## 2023-01-25 RX ORDER — SODIUM CHLORIDE 0.9 % (FLUSH) 0.9 %
3 SYRINGE (ML) INJECTION
Status: DISCONTINUED | OUTPATIENT
Start: 2023-01-25 | End: 2023-01-25 | Stop reason: HOSPADM

## 2023-01-25 RX ORDER — DIPHENHYDRAMINE HYDROCHLORIDE 50 MG/ML
25 INJECTION INTRAMUSCULAR; INTRAVENOUS EVERY 6 HOURS PRN
Status: DISCONTINUED | OUTPATIENT
Start: 2023-01-25 | End: 2023-01-25 | Stop reason: HOSPADM

## 2023-01-25 RX ORDER — DIPHENHYDRAMINE HCL 25 MG
25 CAPSULE ORAL EVERY 4 HOURS PRN
Status: CANCELLED | OUTPATIENT
Start: 2023-01-25

## 2023-01-25 RX ORDER — HYDROMORPHONE HYDROCHLORIDE 2 MG/ML
0.4 INJECTION, SOLUTION INTRAMUSCULAR; INTRAVENOUS; SUBCUTANEOUS EVERY 5 MIN PRN
Status: DISCONTINUED | OUTPATIENT
Start: 2023-01-25 | End: 2023-01-25 | Stop reason: HOSPADM

## 2023-01-25 RX ORDER — PROCHLORPERAZINE EDISYLATE 5 MG/ML
5 INJECTION INTRAMUSCULAR; INTRAVENOUS EVERY 30 MIN PRN
Status: DISCONTINUED | OUTPATIENT
Start: 2023-01-25 | End: 2023-01-25 | Stop reason: HOSPADM

## 2023-01-25 RX ORDER — SODIUM CHLORIDE 9 MG/ML
INJECTION, SOLUTION INTRAVENOUS CONTINUOUS
Status: DISCONTINUED | OUTPATIENT
Start: 2023-01-25 | End: 2023-01-25 | Stop reason: HOSPADM

## 2023-01-25 RX ORDER — SODIUM CHLORIDE, SODIUM LACTATE, POTASSIUM CHLORIDE, CALCIUM CHLORIDE 600; 310; 30; 20 MG/100ML; MG/100ML; MG/100ML; MG/100ML
INJECTION, SOLUTION INTRAVENOUS CONTINUOUS
Status: DISCONTINUED | OUTPATIENT
Start: 2023-01-25 | End: 2023-01-25 | Stop reason: HOSPADM

## 2023-01-25 RX ORDER — MIDAZOLAM HYDROCHLORIDE 1 MG/ML
INJECTION INTRAMUSCULAR; INTRAVENOUS
Status: DISCONTINUED | OUTPATIENT
Start: 2023-01-25 | End: 2023-01-25

## 2023-01-25 RX ORDER — FENTANYL CITRATE 50 UG/ML
INJECTION, SOLUTION INTRAMUSCULAR; INTRAVENOUS
Status: DISCONTINUED | OUTPATIENT
Start: 2023-01-25 | End: 2023-01-25

## 2023-01-25 RX ORDER — SCOLOPAMINE TRANSDERMAL SYSTEM 1 MG/1
1 PATCH, EXTENDED RELEASE TRANSDERMAL ONCE
Status: COMPLETED | OUTPATIENT
Start: 2023-01-25 | End: 2023-01-25

## 2023-01-25 RX ORDER — DEXAMETHASONE SODIUM PHOSPHATE 4 MG/ML
INJECTION, SOLUTION INTRA-ARTICULAR; INTRALESIONAL; INTRAMUSCULAR; INTRAVENOUS; SOFT TISSUE
Status: DISCONTINUED | OUTPATIENT
Start: 2023-01-25 | End: 2023-01-25

## 2023-01-25 RX ORDER — ACETAMINOPHEN 500 MG
1000 TABLET ORAL
Status: COMPLETED | OUTPATIENT
Start: 2023-01-25 | End: 2023-01-25

## 2023-01-25 RX ORDER — ONDANSETRON HYDROCHLORIDE 2 MG/ML
INJECTION, SOLUTION INTRAMUSCULAR; INTRAVENOUS
Status: DISCONTINUED | OUTPATIENT
Start: 2023-01-25 | End: 2023-01-25

## 2023-01-25 RX ADMIN — ONDANSETRON 4 MG: 2 INJECTION INTRAMUSCULAR; INTRAVENOUS at 07:01

## 2023-01-25 RX ADMIN — ACETAMINOPHEN 1000 MG: 500 TABLET, FILM COATED ORAL at 05:01

## 2023-01-25 RX ADMIN — DEXAMETHASONE SODIUM PHOSPHATE 8 MG: 4 INJECTION, SOLUTION INTRAMUSCULAR; INTRAVENOUS at 07:01

## 2023-01-25 RX ADMIN — MIDAZOLAM HYDROCHLORIDE 2 MG: 1 INJECTION, SOLUTION INTRAMUSCULAR; INTRAVENOUS at 06:01

## 2023-01-25 RX ADMIN — LIDOCAINE HYDROCHLORIDE 80 MG: 20 INJECTION, SOLUTION INTRAVENOUS at 06:01

## 2023-01-25 RX ADMIN — SODIUM CHLORIDE, SODIUM LACTATE, POTASSIUM CHLORIDE, AND CALCIUM CHLORIDE: 600; 310; 30; 20 INJECTION, SOLUTION INTRAVENOUS at 06:01

## 2023-01-25 RX ADMIN — SCOPOLAMINE 1 PATCH: 1 SYSTEM TRANSDERMAL at 05:01

## 2023-01-25 RX ADMIN — FENTANYL CITRATE 100 MCG: 0.05 INJECTION, SOLUTION INTRAMUSCULAR; INTRAVENOUS at 06:01

## 2023-01-25 RX ADMIN — PROPOFOL 200 MG: 10 INJECTION, EMULSION INTRAVENOUS at 06:01

## 2023-01-25 RX ADMIN — PHENYLEPHRINE HYDROCHLORIDE 100 MCG: 10 INJECTION INTRAVENOUS at 07:01

## 2023-01-25 RX ADMIN — OXYCODONE 5 MG: 5 TABLET ORAL at 08:01

## 2023-01-25 RX ADMIN — KETOROLAC TROMETHAMINE 30 MG: 30 INJECTION, SOLUTION INTRAMUSCULAR; INTRAVENOUS at 08:01

## 2023-01-25 NOTE — INTERVAL H&P NOTE
The patient has been examined and the H&P has been reviewed:    I concur with the findings and no changes have occurred since H&P was written.    Surgery risks, benefits and alternative options discussed and understood by patient/family.    In short, Roberta Wheat is 52 y.o.  with AUB on tamoxifen here for Hscope D&C. Today, pt with no complaints. Questions about surgery answered. To OR for planned procedure.     Albertina Judge MD PGY-1  Obstetrics and Gynecology  [facility]        There are no hospital problems to display for this patient.

## 2023-01-25 NOTE — DISCHARGE INSTRUCTIONS
Home Care Instruction D&C Hysteroscopy             ACTIVITY LEVEL:  If you received sedation and/or an anesthetic, you may feel sleepy for several hours. Rest until you feel more  awake. Gradually resume your normal activities.    DIET:  At home, continue with liquids. If there is no nausea, you may eat a soft diet and gradually resume a regular diet.    BATHING:  You may shower  as desired in one day.  You should avoid tub baths, hot tubs and swimming pools until seen by your physician for a post-op follow up.    CARE:  You can expect watery or bloody vaginal discharge for several days. Do not use tampons, please only use pads. Sexual activity is restricted as advised by your doctor.    MEDICATIONS:  You will receive instructions for any pain and/or antibiotic prescriptions. Pain medication should be taken only if needed and as directed. Antibiotics, if ordered, should be taken as directed until the entire prescription is completed.    ADDITIONAL INFORMATION:  __________________________________________________________________________________________  WHEN TO CALL THE DOCTOR:   For any heavy vaginal bleeding   Fever over 101°F (38.4°C)   Any lower abdominal pain not relieved by the pain mediation  RETURN APPOINTMENT:  __________________________________________________________________________________________  FOR EMERGENCIES:  If any unusual problems or difficulties occur, contact  __________________ or the resident at [phone] (page ) or the Clinic office, [phone].

## 2023-01-25 NOTE — ANESTHESIA PROCEDURE NOTES
Intubation    Date/Time: 1/25/2023 7:00 AM  Performed by: Chrissy Rizzo CRNA  Authorized by: Nagi Mariee MD     Intubation:     Induction:  Intravenous    Intubated:  Postinduction    Mask Ventilation:  Easy mask    Attempts:  1    Attempted By:  CRNA    Method of Intubation:  Fast track LMA    Difficult Airway Encountered?: No      Complications:  None    Airway Device:  Supraglottic airway/LMA    Airway Device Size:  4.0 (igel)    Secured at:  The lips    Placement Verified By:  Capnometry    Complicating Factors:  None    Findings Post-Intubation:  BS equal bilateral and atraumatic/condition of teeth unchanged

## 2023-01-25 NOTE — ANESTHESIA POSTPROCEDURE EVALUATION
Anesthesia Post Evaluation    Patient: Roberta Wheat    Procedure(s) Performed: Procedure(s) (LRB):  HYSTEROSCOPY, WITH DILATION AND CURETTAGE OF UTERUS (N/A)    Final Anesthesia Type: general      Patient location during evaluation: PACU  Patient participation: Yes- Able to Participate  Level of consciousness: awake and alert  Post-procedure vital signs: reviewed and stable  Pain management: adequate  Airway patency: patent    PONV status at discharge: No PONV  Anesthetic complications: no      Cardiovascular status: blood pressure returned to baseline  Respiratory status: unassisted  Hydration status: euvolemic  Follow-up not needed.          Vitals Value Taken Time   /62 01/25/23 0841   Temp 36.4 °C (97.5 °F) 01/25/23 0754   Pulse 57 01/25/23 0851   Resp 17 01/25/23 0830   SpO2 94 % 01/25/23 0851   Vitals shown include unvalidated device data.      No case tracking events are documented in the log.      Pain/Danika Score: Pain Rating Prior to Med Admin: 6 (1/25/2023  8:20 AM)  Danika Score: 9 (1/25/2023  8:20 AM)

## 2023-01-25 NOTE — OP NOTE
Date of procedure 01/25/2023  Procedure  Dilation curettage  Hysteroscopy  Hysteroscopic myomectomy/polypectomy    Surgeon- Dr. Mily Morris  Anesthesia-general endotracheal  Complications-none   cc  Urine output 150 cc  IV fluids 1000 cc  Hysteroscopic fluid deficit-310 cc    Findings- uterus sounded to 8 cm  3 cm polyp versus fibroid noted in the middle of the uterine cavity  Small polyp noted in the left tubal ostia  Bilateral tubal ostia visualized  Otherwise atrophic appearing endometrium    Specimen-endometrial curettage  Uterine mass    Procedure in detail  After informed consent was obtained patient taken OR regional anesthesia was initiated.  [facility] and draped in usual fashion.  In and out catheter was used to drain the bladder  Two right angle retractors used to visualize cervix  Cervix was grasped with single-tooth tenaculum  Uterus sounded 8 cm  And dilated to a 20.  Parminder dilator  Hysteroscope was introduced under normal saline media  Findings were as noted above  MyoSure device was placed and was used to remove the polyp in the left tubal ostia.  The device was then used to remove the mass in the lower part of the uterus. Excellent visualization was maintained throughout the removal of the mass.   The MyoSure device was removed and gentle curettage was undertaken with return of minimal amount of tissue  Hysteroscope was again introduced.  No bleeding was noted at this point procedure was considered complete  All instruments removed the patient's vagina  All counts were correct x2  Patient tolerated procedure well was awakened and taken to recovery room in stable condition

## 2023-01-25 NOTE — BRIEF OP NOTE
[facility] - Surgery ([place])  Brief Operative Note    Surgery Date: 1/25/2023     Surgeon(s) and Role:     * Mily Morrsi MD - Primary    Assisting Surgeon: None    Pre-op Diagnosis:  Irregular bleeding [N92.6]  Use of tamoxifen (Nolvadex) [Z79.810]    Post-op Diagnosis:  Post-Op Diagnosis Codes:     * Irregular bleeding [N92.6]     * Use of tamoxifen (Nolvadex) [Z79.810]    Procedure(s) (LRB):  HYSTEROSCOPY, WITH DILATION AND CURETTAGE OF UTERUS (N/A)    Anesthesia: General    Operative Findings: uterine mass-polyp vs fibroid 3cm in size, polyp in left tubal ostia    Estimated Blood Loss: 150cc         Specimens:   Specimen (24h ago, onward)       Start     Ordered    01/25/23 0739  Specimen to Pathology, Surgery Gynecology and Obstetrics  Once        Comments: Pre-op Diagnosis: Irregular bleeding [N92.6]Use of tamoxifen (Nolvadex) [Z79.810]Procedure(s):HYSTEROSCOPY, WITH DILATION AND CURETTAGE OF UTERUS Number of specimens: 2Name of specimens: 1. Endometrial curettage2. Uterine mass     References:    Click here for ordering Quick Tip   Question Answer Comment   Procedure Type: Gynecology and Obstetrics    Specimen Class: Routine/Screening    Which provider would you like to cc? MILY MORRIS    Release to patient Immediate        01/25/23 0740                      Discharge Note    OUTCOME: Patient tolerated treatment/procedure well without complication and is now ready for discharge.    DISPOSITION: Home or Self Care    FINAL DIAGNOSIS:  Status post hysteroscopy    FOLLOWUP: In clinic    DISCHARGE INSTRUCTIONS:    Discharge Procedure Orders   Diet Adult Regular     Diet general     No driving until:   Order Comments: No driving while taking narcotics     Pelvic Rest   Order Comments: Nothing in vagina for two weeks     Notify your health care provider if you experience any of the following:  temperature >100.4     Notify your health care provider if you experience any of the following:   persistent nausea and vomiting or diarrhea     Notify your health care provider if you experience any of the following:  redness, tenderness, or signs of infection (pain, swelling, redness, odor or green/yellow discharge around incision site)     Notify your health care provider if you experience any of the following:   Order Comments: Vaginal bleeding greater than 2 saturated pads in 1 hour.     Call MD for:  temperature >100.4     Call MD for:  persistent nausea and vomiting     Call MD for:  severe uncontrolled pain     No dressing needed     Activity as tolerated

## 2023-01-25 NOTE — PLAN OF CARE
Roberta Wheat has met all discharge criteria from Phase II. Vital Signs are stable, ambulating  without difficulty. Discharge instructions given, patient verbalized understanding. Discharged from facility via wheelchair in stable condition.

## 2023-01-25 NOTE — TRANSFER OF CARE
"Anesthesia Transfer of Care Note    Patient: Roberta Wheat    Procedure(s) Performed: Procedure(s) (LRB):  HYSTEROSCOPY, WITH DILATION AND CURETTAGE OF UTERUS (N/A)    Patient location: PACU    Anesthesia Type: general    Transport from OR: Transported from OR on 6-10 L/min O2 by face mask with adequate spontaneous ventilation    Post pain: adequate analgesia    Post assessment: no apparent anesthetic complications and tolerated procedure well    Post vital signs: stable    Level of consciousness: awake    Nausea/Vomiting: no nausea/vomiting    Complications: none    Transfer of care protocol was followed      Last vitals:   Visit Vitals  /73 (BP Location: Left arm, Patient Position: Lying)   Pulse 76   Temp 36.4 °C (97.5 °F) (Oral)   Resp 17   Ht 5' 3" (1.6 m)   Wt 56.7 kg (125 lb)   LMP 12/04/2022   SpO2 98%   Breastfeeding No   BMI 22.14 kg/m²     "

## 2023-01-26 ENCOUNTER — PATIENT MESSAGE (OUTPATIENT)
Dept: OBSTETRICS AND GYNECOLOGY | Facility: CLINIC | Age: 53
End: 2023-01-26
Payer: COMMERCIAL

## 2023-01-26 VITALS
OXYGEN SATURATION: 95 % | RESPIRATION RATE: 16 BRPM | HEART RATE: 59 BPM | WEIGHT: 125 LBS | DIASTOLIC BLOOD PRESSURE: 55 MMHG | SYSTOLIC BLOOD PRESSURE: 92 MMHG | HEIGHT: 63 IN | TEMPERATURE: 98 F | BODY MASS INDEX: 22.15 KG/M2

## 2023-01-27 LAB
FINAL PATHOLOGIC DIAGNOSIS: NORMAL
GROSS: NORMAL
Lab: NORMAL

## 2023-02-15 ENCOUNTER — OFFICE VISIT (OUTPATIENT)
Dept: HEMATOLOGY/ONCOLOGY | Facility: CLINIC | Age: 53
End: 2023-02-15
Payer: COMMERCIAL

## 2023-02-15 VITALS
OXYGEN SATURATION: 100 % | WEIGHT: 122 LBS | RESPIRATION RATE: 17 BRPM | BODY MASS INDEX: 21.62 KG/M2 | TEMPERATURE: 97 F | SYSTOLIC BLOOD PRESSURE: 100 MMHG | DIASTOLIC BLOOD PRESSURE: 55 MMHG | HEART RATE: 80 BPM | HEIGHT: 63 IN

## 2023-02-15 DIAGNOSIS — Z85.3 HISTORY OF BREAST CANCER IN FEMALE: Primary | ICD-10-CM

## 2023-02-15 DIAGNOSIS — Z79.810 SERM USE (SELECTIVE ESTROGEN RECEPTOR MODULATOR): ICD-10-CM

## 2023-02-15 PROCEDURE — 3044F PR MOST RECENT HEMOGLOBIN A1C LEVEL <7.0%: ICD-10-PCS | Mod: CPTII,S$GLB,, | Performed by: INTERNAL MEDICINE

## 2023-02-15 PROCEDURE — 3074F SYST BP LT 130 MM HG: CPT | Mod: CPTII,S$GLB,, | Performed by: INTERNAL MEDICINE

## 2023-02-15 PROCEDURE — 3078F PR MOST RECENT DIASTOLIC BLOOD PRESSURE < 80 MM HG: ICD-10-PCS | Mod: CPTII,S$GLB,, | Performed by: INTERNAL MEDICINE

## 2023-02-15 PROCEDURE — 1159F MED LIST DOCD IN RCRD: CPT | Mod: CPTII,S$GLB,, | Performed by: INTERNAL MEDICINE

## 2023-02-15 PROCEDURE — 99214 PR OFFICE/OUTPT VISIT, EST, LEVL IV, 30-39 MIN: ICD-10-PCS | Mod: S$GLB,,, | Performed by: INTERNAL MEDICINE

## 2023-02-15 PROCEDURE — 3008F BODY MASS INDEX DOCD: CPT | Mod: CPTII,S$GLB,, | Performed by: INTERNAL MEDICINE

## 2023-02-15 PROCEDURE — 1159F PR MEDICATION LIST DOCUMENTED IN MEDICAL RECORD: ICD-10-PCS | Mod: CPTII,S$GLB,, | Performed by: INTERNAL MEDICINE

## 2023-02-15 PROCEDURE — 99999 PR PBB SHADOW E&M-EST. PATIENT-LVL IV: ICD-10-PCS | Mod: PBBFAC,,, | Performed by: INTERNAL MEDICINE

## 2023-02-15 PROCEDURE — 3078F DIAST BP <80 MM HG: CPT | Mod: CPTII,S$GLB,, | Performed by: INTERNAL MEDICINE

## 2023-02-15 PROCEDURE — 3044F HG A1C LEVEL LT 7.0%: CPT | Mod: CPTII,S$GLB,, | Performed by: INTERNAL MEDICINE

## 2023-02-15 PROCEDURE — 3008F PR BODY MASS INDEX (BMI) DOCUMENTED: ICD-10-PCS | Mod: CPTII,S$GLB,, | Performed by: INTERNAL MEDICINE

## 2023-02-15 PROCEDURE — 3074F PR MOST RECENT SYSTOLIC BLOOD PRESSURE < 130 MM HG: ICD-10-PCS | Mod: CPTII,S$GLB,, | Performed by: INTERNAL MEDICINE

## 2023-02-15 PROCEDURE — 99214 OFFICE O/P EST MOD 30 MIN: CPT | Mod: S$GLB,,, | Performed by: INTERNAL MEDICINE

## 2023-02-15 PROCEDURE — 99999 PR PBB SHADOW E&M-EST. PATIENT-LVL IV: CPT | Mod: PBBFAC,,, | Performed by: INTERNAL MEDICINE

## 2023-02-15 NOTE — PROGRESS NOTES
Subjective:       Patient ID: Roberta Wheat is a 52 y.o. female.    Chief Complaint: No chief complaint on file.      HPI     Mrs. Wheat returns today for follow-up.  I had last seen her in early December 2022.  She has been on tamoxifen since April 2018 and has tolerated it well so far.  At the time of her mastectomies she had a 1.2 cm carcinoma with mixed ductal and lobular features, while two sentinel nodes were negative.  There was LCIS in the nipple duct, and the nipples were removed.  Resection margins were clear.     Briefly, she is a 52-year-old  female who underwent bilateral mastectomies for a 12 mm mixed lobular and infiltrating ductal carcinoma that was ER strongly positive, ME positive, and HER-2 negative.  Ki-67 was 30%.  Her Oncotype score was 17.  She is perimenopausal.  Her periods are rather irregular at this point, with the last 1 being in early December and prompting the recent D&C (See below).  Two weeks ago she underwent an endometrial biopsy by Dr. Morris that showed normal proliferative endometrium.      On April 22nd 2021 she underwent a resection of a lesion in the forehead that ended up being an in Situ melanoma.  Resection margins were clear.  She is going to [facility] every for 4 months for skin surveillance visits.      Review of Systems      Overall she feels OK,   She denies any pain..  ECOG PS is 0.  Her LMP was in December 2022.  She denies any depression, easy bruising, fevers, chills, night  sweats, weight loss, nausea, vomiting, diarrhea, constipation, diplopia, blurred vision, headache, chest pain, palpitations, shortness of breath, breast pain, abdominal pain, or difficulty ambulating.  The remainder of the ten-point ROS, including general, skin, lymph, H/N, cardiorespiratory, GI, , Neuro, Endocrine, and psychiatric is negative.     Objective:      Physical Exam      She is alert, oriented to time, place, person, pleasant, well      nourished, in no  acute physical distress.                                VITAL SIGNS:  Reviewed                                      HEENT:  Normal.  There are no nasal, oral, lip, gingival, auricular, lid,    or conjunctival lesions.  Mucosae are moist and pink, and there is no        thrush.  Pupils are equal, reactive to light and accommodation.              Extraocular muscle movements are intact.  Dentition is good.  There is no frontal or maxillary tenderness.     NECK:  Supple without JVD, adenopathy, or thyromegaly.                       LUNGS:  Clear to auscultation without wheezing, rales, or rhonchi.           CARDIOVASCULAR:  Reveals an S1, S2, no murmurs, no rubs, no gallops.         ABDOMEN:  Soft, nontender, without organomegaly.  Bowel sounds are    present.                                                                     EXTREMITIES:  No cyanosis, clubbing, or edema.                               BREASTS:  she is s/p bilateral mastectomies with prostheses in place.  Her incisions have healed nicely and there is no evidence of a chest wall recurrence.                             LYMPHATIC:  There is no cervical, axillary, or supraclavicular adenopathy.   SKIN:  Warm and moist, without petechiae, rashes, induration, or ecchymoses.           NEUROLOGIC:  DTRs are 0-1+ bilaterally, symmetrical, motor function is 5/5,  and cranial nerves are  within normal limits.  Assessment:       1. History of breast cancer in female    2. SERM use (selective estrogen receptor modulator)      3.    LCIS, status post bilateral mastectomies.  4.      Status post resection of an in Situ melanoma from the forehead.  5.      Dysfunctional uterine bleed, status post D&C  Plan:         I had a long discussion with Mrs. Wheat  She will complete 5 years in late April 2023.  We will run the breast cancer index test to determine whether there is benefit from extending her treatment from 5-10 years.  If there is benefit, for now she will  remain on tamoxifen rather than be switched to anastrozole  In regards to the in Situ melanoma, no additional treatment is needed at this point.    I will see her again in mid April   Her multiple questions were answered to her satisfaction.    Route Chart for Scheduling    Med Onc Chart Routing      Follow up with physician 2 months. See me in 2 months.  We need to run the breast cancer index test on her cancer biopsy material.  Corinne is aware   Follow up with [facility]    Infusion scheduling note    Injection scheduling note    Labs    Imaging    Pharmacy appointment    Other referrals

## 2023-02-23 ENCOUNTER — DOCUMENTATION ONLY (OUTPATIENT)
Dept: SURGERY | Facility: CLINIC | Age: 53
End: 2023-02-23
Payer: COMMERCIAL

## 2023-03-09 ENCOUNTER — TELEPHONE (OUTPATIENT)
Dept: HEMATOLOGY/ONCOLOGY | Facility: CLINIC | Age: 53
End: 2023-03-09
Payer: COMMERCIAL

## 2023-03-09 NOTE — TELEPHONE ENCOUNTER
----- Message from Madhavi Akbar sent at 3/9/2023 10:32 AM CST -----  Contact: Pt   [facility] [employer] is calling in regards to a test request that was received for pt. They called multiple times and left multiple messages with no callback.         Confirmed contact below:   Contact Name:Maris   Phone Number: [phone]

## 2023-03-22 NOTE — TELEPHONE ENCOUNTER
Spoke with patient regarding her glucose results. Recommend that we repeat it at a different time of day. She will go to [facility] today   Propranolol Counseling:  I discussed with the patient the risks of propranolol including but not limited to low heart rate, low blood pressure, low blood sugar, restlessness and increased cold sensitivity. They should call the office if they experience any of these side effects.

## 2023-03-27 ENCOUNTER — DOCUMENTATION ONLY (OUTPATIENT)
Dept: HEMATOLOGY/ONCOLOGY | Facility: CLINIC | Age: 53
End: 2023-03-27
Payer: COMMERCIAL

## 2023-04-12 ENCOUNTER — OFFICE VISIT (OUTPATIENT)
Dept: HEMATOLOGY/ONCOLOGY | Facility: CLINIC | Age: 53
End: 2023-04-12
Payer: COMMERCIAL

## 2023-04-12 VITALS
TEMPERATURE: 98 F | BODY MASS INDEX: 21.11 KG/M2 | OXYGEN SATURATION: 100 % | DIASTOLIC BLOOD PRESSURE: 66 MMHG | HEART RATE: 67 BPM | RESPIRATION RATE: 14 BRPM | HEIGHT: 64 IN | WEIGHT: 123.69 LBS | SYSTOLIC BLOOD PRESSURE: 108 MMHG

## 2023-04-12 DIAGNOSIS — Z85.3 HISTORY OF BREAST CANCER IN FEMALE: ICD-10-CM

## 2023-04-12 DIAGNOSIS — Z79.810 SERM USE (SELECTIVE ESTROGEN RECEPTOR MODULATOR): Primary | ICD-10-CM

## 2023-04-12 PROCEDURE — 99214 PR OFFICE/OUTPT VISIT, EST, LEVL IV, 30-39 MIN: ICD-10-PCS | Mod: S$GLB,,, | Performed by: INTERNAL MEDICINE

## 2023-04-12 PROCEDURE — 1159F PR MEDICATION LIST DOCUMENTED IN MEDICAL RECORD: ICD-10-PCS | Mod: CPTII,S$GLB,, | Performed by: INTERNAL MEDICINE

## 2023-04-12 PROCEDURE — 3044F PR MOST RECENT HEMOGLOBIN A1C LEVEL <7.0%: ICD-10-PCS | Mod: CPTII,S$GLB,, | Performed by: INTERNAL MEDICINE

## 2023-04-12 PROCEDURE — 3044F HG A1C LEVEL LT 7.0%: CPT | Mod: CPTII,S$GLB,, | Performed by: INTERNAL MEDICINE

## 2023-04-12 PROCEDURE — 1159F MED LIST DOCD IN RCRD: CPT | Mod: CPTII,S$GLB,, | Performed by: INTERNAL MEDICINE

## 2023-04-12 PROCEDURE — 3074F PR MOST RECENT SYSTOLIC BLOOD PRESSURE < 130 MM HG: ICD-10-PCS | Mod: CPTII,S$GLB,, | Performed by: INTERNAL MEDICINE

## 2023-04-12 PROCEDURE — 99999 PR PBB SHADOW E&M-EST. PATIENT-LVL IV: ICD-10-PCS | Mod: PBBFAC,,, | Performed by: INTERNAL MEDICINE

## 2023-04-12 PROCEDURE — 3008F PR BODY MASS INDEX (BMI) DOCUMENTED: ICD-10-PCS | Mod: CPTII,S$GLB,, | Performed by: INTERNAL MEDICINE

## 2023-04-12 PROCEDURE — 3074F SYST BP LT 130 MM HG: CPT | Mod: CPTII,S$GLB,, | Performed by: INTERNAL MEDICINE

## 2023-04-12 PROCEDURE — 99214 OFFICE O/P EST MOD 30 MIN: CPT | Mod: S$GLB,,, | Performed by: INTERNAL MEDICINE

## 2023-04-12 PROCEDURE — 3008F BODY MASS INDEX DOCD: CPT | Mod: CPTII,S$GLB,, | Performed by: INTERNAL MEDICINE

## 2023-04-12 PROCEDURE — 3078F DIAST BP <80 MM HG: CPT | Mod: CPTII,S$GLB,, | Performed by: INTERNAL MEDICINE

## 2023-04-12 PROCEDURE — 99999 PR PBB SHADOW E&M-EST. PATIENT-LVL IV: CPT | Mod: PBBFAC,,, | Performed by: INTERNAL MEDICINE

## 2023-04-12 PROCEDURE — 3078F PR MOST RECENT DIASTOLIC BLOOD PRESSURE < 80 MM HG: ICD-10-PCS | Mod: CPTII,S$GLB,, | Performed by: INTERNAL MEDICINE

## 2023-04-12 NOTE — PROGRESS NOTES
Subjective:       Patient ID: Roberta Wheat is a 53 y.o. female.    Chief Complaint: History of breast cancer in female      HPI     Mrs. Wheat returns today for follow-up.  I had last seen her in [facility] 2023.  She has been on tamoxifen since April 2018 and has tolerated it well so far.  At the time of her mastectomies she had a 1.2 cm carcinoma with mixed ductal and lobular features, while two sentinel nodes were negative.  There was LCIS in the nipple duct, and the nipples were removed.  Resection margins were clear.     Briefly, she is a 52-year-old  female who underwent bilateral mastectomies for a 12 mm mixed lobular and infiltrating ductal carcinoma that was ER strongly positive, [place] positive, and HER-2 negative.  Ki-67 was 30%.  Her Oncotype score was 17.  She is perimenopausal.  Her periods are rather irregular at this point, with the last one being in early December 2022 and prompting a D&C by Dr. Morris that showed normal proliferative endometrium.      On April 22nd 2021 she underwent a resection of a lesion in the forehead that ended up being an in Situ melanoma.  Resection margins were clear.  She is going to [facility] every for 4 months for skin surveillance visits.  Her breast cancer index test showed her risk of recurrence at 7.3% with 5 years of adjuvant hormonal therapy versus 2.5-4.1% with 10 years of adjuvant hormonal therapy.    Review of Systems      Overall she feels OK,   She denies any pain..  ECOG PS is 0.  Her LMP was in December 2022.  She still experiences occasional hot flashes but she denies any depression, easy bruising, fevers, chills, night  sweats, weight loss, nausea, vomiting, diarrhea, constipation, diplopia, blurred vision, headache, chest pain, palpitations, shortness of breath, breast pain, abdominal pain, or difficulty ambulating.  The remainder of the ten-point ROS, including general, skin, lymph, H/N, cardiorespiratory, GI, , Neuro, Endocrine, and  Telephone call to pt. home number, no answer, message left to call clinic back.    psychiatric is negative.     Objective:      Physical Exam      She is alert, oriented to time, place, person, pleasant, well      nourished, in no acute physical distress.                                VITAL SIGNS:  Reviewed                                      HEENT:  Normal.  There are no nasal, oral, lip, gingival, auricular, lid,    or conjunctival lesions.  Mucosae are moist and pink, and there is no        thrush.  Pupils are equal, reactive to light and accommodation.              Extraocular muscle movements are intact.  Dentition is good.  There is no frontal or maxillary tenderness.     NECK:  Supple without JVD, adenopathy, or thyromegaly.                       LUNGS:  Clear to auscultation without wheezing, rales, or rhonchi.           CARDIOVASCULAR:  Reveals an S1, S2, no murmurs, no rubs, no gallops.         ABDOMEN:  Soft, nontender, without organomegaly.  Bowel sounds are    present.                                                                     EXTREMITIES:  No cyanosis, clubbing, or edema.                               BREASTS:  she is s/p bilateral mastectomies with prostheses in place.  Her incisions have healed nicely and there is no evidence of a chest wall recurrence.                             LYMPHATIC:  There is no cervical, axillary, or supraclavicular adenopathy.   SKIN:  Warm and moist, without petechiae, rashes, induration, or ecchymoses.           NEUROLOGIC:  DTRs are 0-1+ bilaterally, symmetrical, motor function is 5/5,  and cranial nerves are  within normal limits.  Assessment:       1. SERM use (selective estrogen receptor modulator)    2. History of breast cancer in female      3.    LCIS, status post bilateral mastectomies.  4.      Status post resection of an in Situ melanoma from the forehead.  5.      Dysfunctional uterine bleed, status post D&C  Plan:         I had a long discussion with Mrs. Wheat.  We went over the [facility] results.  I recommended that she remain on  tamoxifen.  I explained to her that she has to be at least 12 months from her last period before we consider switching to a ice.  She voiced understanding.  In regards to the in Situ melanoma, no additional treatment is needed at this point.    I will see her again in 6 months   Her multiple questions were answered to her satisfaction.    Route Chart for Scheduling    Med Onc Chart Routing      Follow up with physician 6 months.   Follow up with [facility]    Infusion scheduling note    Injection scheduling note    Labs    Imaging    Pharmacy appointment    Other referrals

## 2023-10-11 ENCOUNTER — OFFICE VISIT (OUTPATIENT)
Dept: HEMATOLOGY/ONCOLOGY | Facility: CLINIC | Age: 53
End: 2023-10-11
Payer: COMMERCIAL

## 2023-10-11 VITALS
OXYGEN SATURATION: 100 % | WEIGHT: 123.69 LBS | HEART RATE: 66 BPM | DIASTOLIC BLOOD PRESSURE: 58 MMHG | TEMPERATURE: 99 F | SYSTOLIC BLOOD PRESSURE: 114 MMHG | RESPIRATION RATE: 18 BRPM | HEIGHT: 63 IN | BODY MASS INDEX: 21.91 KG/M2

## 2023-10-11 DIAGNOSIS — Z79.810 SERM USE (SELECTIVE ESTROGEN RECEPTOR MODULATOR): ICD-10-CM

## 2023-10-11 DIAGNOSIS — Z85.3 HISTORY OF BREAST CANCER IN FEMALE: Primary | ICD-10-CM

## 2023-10-11 DIAGNOSIS — C50.611 CARCINOMA OF AXILLARY TAIL OF RIGHT BREAST IN FEMALE, ESTROGEN RECEPTOR POSITIVE: ICD-10-CM

## 2023-10-11 DIAGNOSIS — Z17.0 CARCINOMA OF AXILLARY TAIL OF RIGHT BREAST IN FEMALE, ESTROGEN RECEPTOR POSITIVE: ICD-10-CM

## 2023-10-11 DIAGNOSIS — Z90.13 HISTORY OF MASTECTOMY, BILATERAL: ICD-10-CM

## 2023-10-11 PROCEDURE — 99214 OFFICE O/P EST MOD 30 MIN: CPT | Mod: S$GLB,,, | Performed by: INTERNAL MEDICINE

## 2023-10-11 PROCEDURE — 99214 PR OFFICE/OUTPT VISIT, EST, LEVL IV, 30-39 MIN: ICD-10-PCS | Mod: S$GLB,,, | Performed by: INTERNAL MEDICINE

## 2023-10-11 PROCEDURE — 3078F DIAST BP <80 MM HG: CPT | Mod: CPTII,S$GLB,, | Performed by: INTERNAL MEDICINE

## 2023-10-11 PROCEDURE — 99999 PR PBB SHADOW E&M-EST. PATIENT-LVL III: ICD-10-PCS | Mod: PBBFAC,,, | Performed by: INTERNAL MEDICINE

## 2023-10-11 PROCEDURE — 3044F PR MOST RECENT HEMOGLOBIN A1C LEVEL <7.0%: ICD-10-PCS | Mod: CPTII,S$GLB,, | Performed by: INTERNAL MEDICINE

## 2023-10-11 PROCEDURE — 99999 PR PBB SHADOW E&M-EST. PATIENT-LVL III: CPT | Mod: PBBFAC,,, | Performed by: INTERNAL MEDICINE

## 2023-10-11 PROCEDURE — 3074F SYST BP LT 130 MM HG: CPT | Mod: CPTII,S$GLB,, | Performed by: INTERNAL MEDICINE

## 2023-10-11 PROCEDURE — 3008F PR BODY MASS INDEX (BMI) DOCUMENTED: ICD-10-PCS | Mod: CPTII,S$GLB,, | Performed by: INTERNAL MEDICINE

## 2023-10-11 PROCEDURE — 3074F PR MOST RECENT SYSTOLIC BLOOD PRESSURE < 130 MM HG: ICD-10-PCS | Mod: CPTII,S$GLB,, | Performed by: INTERNAL MEDICINE

## 2023-10-11 PROCEDURE — 3044F HG A1C LEVEL LT 7.0%: CPT | Mod: CPTII,S$GLB,, | Performed by: INTERNAL MEDICINE

## 2023-10-11 PROCEDURE — 3078F PR MOST RECENT DIASTOLIC BLOOD PRESSURE < 80 MM HG: ICD-10-PCS | Mod: CPTII,S$GLB,, | Performed by: INTERNAL MEDICINE

## 2023-10-11 PROCEDURE — 3008F BODY MASS INDEX DOCD: CPT | Mod: CPTII,S$GLB,, | Performed by: INTERNAL MEDICINE

## 2023-10-11 RX ORDER — TAMOXIFEN CITRATE 20 MG/1
20 TABLET ORAL DAILY
Qty: 90 TABLET | Refills: 3 | Status: SHIPPED | OUTPATIENT
Start: 2023-10-11 | End: 2024-01-29 | Stop reason: ALTCHOICE

## 2023-10-11 NOTE — PROGRESS NOTES
Subjective:       Patient ID: Roberta Wheat is a 53 y.o. female.    Chief Complaint: No chief complaint on file.      HPI     Mrs. Wheat returns today for follow-up.  I had last seen her in [facility] 2023.  She has been on tamoxifen since April 2018 and has tolerated it well so far.  Of note, her breast cancer index test had shown her risk of recurrence at 7.3% with 5 years of adjuvant hormonal therapy versus 2.5-4.1% with 10 years of adjuvant hormonal therapy, hence my recommendation in to extend her.  At the time of her mastectomies she had a 1.2 cm carcinoma with mixed ductal and lobular features, while two sentinel nodes were negative.  There was LCIS in the nipple duct, and the nipples were removed.  Resection margins were clear.     Briefly, she is a 53-year-old  female who underwent bilateral mastectomies for a 12 mm mixed lobular and infiltrating ductal carcinoma that was ER strongly positive, AK positive, and HER-2 negative.  Ki-67 was 30%.  Her Oncotype score was 17.    She is still perimenopausal.  Her periods are rather irregular at this point, with the last one being in early December 2022 and prompting a D&C by Dr. Morris that showed normal proliferative endometrium.      On April 22nd 2021 she underwent a resection of a lesion in the forehead that ended up being an in Situ melanoma.  Resection margins were clear.  She is going to [facility] every for 4 months for skin surveillance visits.    Review of Systems      Overall she feels OK,   She denies any pain.  ECOG PS is 0.  Her LMP was in December 2022.  She still experiences occasional hot flashes but she denies any depression, easy bruising, fevers, chills, night  sweats, weight loss, nausea, vomiting, diarrhea, constipation, diplopia, blurred vision, headache, chest pain, palpitations, shortness of breath, breast pain, abdominal pain, or difficulty ambulating.  The remainder of the ten-point ROS, including general, skin, lymph,  H/N, cardiorespiratory, GI, , Neuro, Endocrine, and psychiatric is negative.     Objective:      Physical Exam      She is alert, oriented to time, place, person, pleasant, well      nourished, in no acute physical distress.                                VITAL SIGNS:  Reviewed                                      HEENT:  Normal.  There are no nasal, oral, lip, gingival, auricular, lid,    or conjunctival lesions.  Mucosae are moist and pink, and there is no        thrush.  Pupils are equal, reactive to light and accommodation.              Extraocular muscle movements are intact.  Dentition is good.  There is no frontal or maxillary tenderness.     NECK:  Supple without JVD, adenopathy, or thyromegaly.                       LUNGS:  Clear to auscultation without wheezing, rales, or rhonchi.           CARDIOVASCULAR:  Reveals an S1, S2, no murmurs, no rubs, no gallops.         ABDOMEN:  Soft, nontender, without organomegaly.  Bowel sounds are    present.                                                                     EXTREMITIES:  No cyanosis, clubbing, or edema.                               BREASTS:  she is s/p bilateral mastectomies with prostheses in place.  Her incisions have healed nicely and there is no evidence of a chest wall recurrence.                             LYMPHATIC:  There is no cervical, axillary, or supraclavicular adenopathy.   SKIN:  Warm and moist, without petechiae, rashes, induration, or ecchymoses.           NEUROLOGIC:  DTRs are 0-1+ bilaterally, symmetrical, motor function is 5/5,  and cranial nerves are  within normal limits.  Assessment:       No diagnosis found.    3.    LCIS, status post bilateral mastectomies.  4.      Status post resection of an in Situ melanoma from the forehead.  5.      Dysfunctional uterine bleed, status post D&C  Plan:         I had a long discussion with Mrs. Wheat.  I again recommended that she remain on tamoxifen, which was started in April 2018.  I  explained to her that she has to be at least 12 months from her last period before we consider switching to an AI.  She voiced understanding.  In regards to the in Situ melanoma, no additional treatment is needed at this point.    I will see her again in 6 months and assuming that she has not had another period between now and then, we will check an FSH level and estradiol level.  If she is truly postmenopausal she will be switch to aromatase inhibitors at the time of her next visit.  Her multiple questions were answered to her satisfaction.    Route Chart for Scheduling    Med Onc Chart Routing      Follow up with physician 6 months. Needs lab work 2-3 days prior please   Follow up with [facility]    Infusion scheduling note    Injection scheduling note    Labs Other   Scheduling:  Preferred lab:  Lab interval:     Imaging    Pharmacy appointment    Other referrals

## 2023-10-28 ENCOUNTER — PATIENT MESSAGE (OUTPATIENT)
Dept: HEMATOLOGY/ONCOLOGY | Facility: CLINIC | Age: 53
End: 2023-10-28
Payer: COMMERCIAL

## 2023-10-31 ENCOUNTER — TELEPHONE (OUTPATIENT)
Dept: ENDOSCOPY | Facility: HOSPITAL | Age: 53
End: 2023-10-31
Payer: COMMERCIAL

## 2023-10-31 NOTE — TELEPHONE ENCOUNTER
"----- Message from Debra Malik sent at 10/31/2023  9:24 AM CDT -----    ----- Message -----  From: Jonah Alarcon MD  Sent: 10/30/2023   5:53 PM CDT  To: [facility] Patients    Procedure: Colonoscopy    Diagnosis: Surveillance colonoscopy - Hx of colon polyps    Procedure Timin-12 weeks    #If within 4 weeks selected, please maris as high priority#    #If greater than 12 weeks, please select "5-12 weeks" and delay sending until 3 months prior to requested date#     Provider: Myself    Location: [address]    Additional Scheduling Information: No scheduling concerns    Prep Specifications:Standard prep    Is the patient taking a GLP-1 Agonist:no    Have you attached a patient to this message: yes       "

## 2023-10-31 NOTE — TELEPHONE ENCOUNTER
Contacted the patient to schedule an endoscopy procedure(s) 10/31/2023. The patient did not answer the call and left a voice message requesting a call back.

## 2023-11-01 ENCOUNTER — TELEPHONE (OUTPATIENT)
Dept: ENDOSCOPY | Facility: HOSPITAL | Age: 53
End: 2023-11-01
Payer: COMMERCIAL

## 2023-11-01 VITALS — HEIGHT: 64 IN | WEIGHT: 121 LBS | BODY MASS INDEX: 20.66 KG/M2

## 2023-11-01 DIAGNOSIS — Z86.010 HISTORY OF COLON POLYPS: Primary | ICD-10-CM

## 2023-11-01 DIAGNOSIS — Z12.11 SCREEN FOR COLON CANCER: ICD-10-CM

## 2023-11-01 RX ORDER — SOD SULF/POT CHLORIDE/MAG SULF 1.479 G
12 TABLET ORAL DAILY
Qty: 24 TABLET | Refills: 0 | Status: SHIPPED | OUTPATIENT
Start: 2023-11-01

## 2023-11-01 NOTE — TELEPHONE ENCOUNTER
Spoke to Roberta to schedule procedure(s) Colonoscopy       Physician to perform procedure(s) Dr. ARACELIS Alarcon  Date of Procedure (s) 2/6/24  Arrival Time 8:40 AM  Time of Procedure(s) 9:40 AM   Location of Procedure(s) [facility] [address] [facility]  Type of Rx Prep sent to patient: Sutab  Instructions provided to patient via [facility]    Patient was informed on the following information and verbalized understanding. Screening questionnaire reviewed with patient and complete. If procedure requires anesthesia, a responsible adult needs to be present to accompany the patient home, patient cannot drive after receiving anesthesia. Appointment details are tentative, especially check-in time. Patient will receive a prep-op call 4 days prior to confirm check-in time for procedure. If applicable the patient should contact their pharmacy to verify Rx for procedure prep is ready for pick-up. Patient was advised to call the scheduling department at [phone] if pharmacy states no Rx is available. Patient was advised to call the endoscopy scheduling department if any questions or concerns arise.      [facility] Scheduling Department

## 2023-11-01 NOTE — TELEPHONE ENCOUNTER
"Procedure: Colonoscopy     Diagnosis: Surveillance colonoscopy - Hx of colon polyps     Procedure Timin-12 weeks     #If within 4 weeks selected, please maris as high priority#     #If greater than 12 weeks, please select "5-12 weeks" and delay sending until 3 months prior to requested date#      Provider: Myself     Location: [address]     Additional Scheduling Information: No scheduling concerns     Prep Specifications:Standard prep     Is the patient taking a GLP-1 Agonist:no     Have you attached a patient to this message: yes   "

## 2023-11-01 NOTE — TELEPHONE ENCOUNTER
----- Message from Debra Malik sent at 11/1/2023 11:02 AM CDT -----  Regarding: FW: appt  Contact: @[phone]    ----- Message -----  From: Bubba Gilmore  Sent: 11/1/2023   9:12 AM CDT  To: [facility] Endo Schedulers  Subject: appt                                             Pt calling to get colonscopy sched ... Missed call yesterday from ninfa.... Pls call and [phone]

## 2024-01-04 ENCOUNTER — PATIENT MESSAGE (OUTPATIENT)
Dept: HEMATOLOGY/ONCOLOGY | Facility: CLINIC | Age: 54
End: 2024-01-04
Payer: COMMERCIAL

## 2024-01-04 DIAGNOSIS — Z85.3 HISTORY OF BREAST CANCER: Primary | ICD-10-CM

## 2024-01-09 ENCOUNTER — HOSPITAL ENCOUNTER (OUTPATIENT)
Dept: RADIOLOGY | Facility: HOSPITAL | Age: 54
Discharge: HOME | End: 2024-01-09
Attending: INTERNAL MEDICINE
Payer: COMMERCIAL

## 2024-01-09 DIAGNOSIS — Z85.3 HISTORY OF BREAST CANCER: ICD-10-CM

## 2024-01-09 PROCEDURE — A9503 TC99M MEDRONATE: HCPCS

## 2024-01-09 PROCEDURE — 78306 BONE IMAGING WHOLE BODY: CPT | Mod: 26,,, | Performed by: STUDENT IN AN ORGANIZED HEALTH CARE EDUCATION/TRAINING PROGRAM

## 2024-01-11 ENCOUNTER — LAB VISIT (OUTPATIENT)
Dept: LAB | Facility: HOSPITAL | Age: 54
End: 2024-01-11
Attending: INTERNAL MEDICINE
Payer: COMMERCIAL

## 2024-01-11 ENCOUNTER — OFFICE VISIT (OUTPATIENT)
Dept: HEMATOLOGY/ONCOLOGY | Facility: CLINIC | Age: 54
End: 2024-01-11
Payer: COMMERCIAL

## 2024-01-11 VITALS
RESPIRATION RATE: 18 BRPM | OXYGEN SATURATION: 98 % | WEIGHT: 126.13 LBS | DIASTOLIC BLOOD PRESSURE: 73 MMHG | SYSTOLIC BLOOD PRESSURE: 120 MMHG | TEMPERATURE: 98 F | HEIGHT: 63 IN | HEART RATE: 69 BPM | BODY MASS INDEX: 22.35 KG/M2

## 2024-01-11 DIAGNOSIS — Z79.810 SERM USE (SELECTIVE ESTROGEN RECEPTOR MODULATOR): ICD-10-CM

## 2024-01-11 DIAGNOSIS — Z90.13 HISTORY OF MASTECTOMY, BILATERAL: ICD-10-CM

## 2024-01-11 DIAGNOSIS — Z90.13 HISTORY OF MASTECTOMY, BILATERAL: Primary | ICD-10-CM

## 2024-01-11 DIAGNOSIS — Z85.3 HISTORY OF BREAST CANCER IN FEMALE: ICD-10-CM

## 2024-01-11 LAB
ESTRADIOL SERPL-MCNC: <10 PG/ML
FSH SERPL-ACNC: 17.15 MIU/ML

## 2024-01-11 PROCEDURE — 3074F SYST BP LT 130 MM HG: CPT | Mod: CPTII,S$GLB,, | Performed by: INTERNAL MEDICINE

## 2024-01-11 PROCEDURE — 99215 OFFICE O/P EST HI 40 MIN: CPT | Mod: S$GLB,,, | Performed by: INTERNAL MEDICINE

## 2024-01-11 PROCEDURE — 1159F MED LIST DOCD IN RCRD: CPT | Mod: CPTII,S$GLB,, | Performed by: INTERNAL MEDICINE

## 2024-01-11 PROCEDURE — 99999 PR PBB SHADOW E&M-EST. PATIENT-LVL IV: CPT | Mod: PBBFAC,,, | Performed by: INTERNAL MEDICINE

## 2024-01-11 PROCEDURE — 82670 ASSAY OF TOTAL ESTRADIOL: CPT | Performed by: INTERNAL MEDICINE

## 2024-01-11 PROCEDURE — 3008F BODY MASS INDEX DOCD: CPT | Mod: CPTII,S$GLB,, | Performed by: INTERNAL MEDICINE

## 2024-01-11 PROCEDURE — 3078F DIAST BP <80 MM HG: CPT | Mod: CPTII,S$GLB,, | Performed by: INTERNAL MEDICINE

## 2024-01-11 PROCEDURE — 83001 ASSAY OF GONADOTROPIN (FSH): CPT | Performed by: INTERNAL MEDICINE

## 2024-01-11 PROCEDURE — 36415 COLL VENOUS BLD VENIPUNCTURE: CPT | Performed by: INTERNAL MEDICINE

## 2024-01-11 PROCEDURE — 1160F RVW MEDS BY RX/DR IN RCRD: CPT | Mod: CPTII,S$GLB,, | Performed by: INTERNAL MEDICINE

## 2024-01-11 NOTE — PROGRESS NOTES
Subjective:       Patient ID: Roberta Wheat is a 53 y.o. female.    Chief Complaint: Malignant neoplasm of right breast in female, estrogen rece      HPI     Mrs. Wheat returns today for follow-up.  I had last seen her on October 10, 2023.  She has been on tamoxifen since April 2018 and has tolerated it well so far.  Of note, her breast cancer index test had shown her risk of recurrence at 7.3% with 5 years of adjuvant hormonal therapy versus 2.5-4.1% with 10 years of adjuvant hormonal therapy, hence my recommendation to extend her treatment with the tamoxifen.    At the time of her mastectomies she had a 1.2 cm carcinoma with mixed ductal and lobular features, while two sentinel nodes were negative.  There was LCIS in the nipple duct, and the nipples were removed.  Resection margins were clear.     Briefly, she is a 53-year-old  female who underwent bilateral mastectomies for a 12 mm mixed lobular and infiltrating ductal carcinoma that was ER strongly positive, WA positive, and HER-2 negative.  Ki-67 was 30%.  Her Oncotype score was 17.    She states that her last period was in early December 2022 and had prompted a D&C by Dr. Morris that showed normal proliferative endometrium.      On April 22nd 2021 she underwent a resection of a lesion in the forehead that ended up being an in Situ melanoma.  Resection margins were clear.  She is going to [facility] every for 4 months for skin surveillance visits.    Review of Systems      Overall she feels OK,   She denies any pain.  ECOG PS is 0.  As mentioned above, her LMP was in December 2022.  She still experiences occasional hot flashes but she denies any depression, easy bruising, fevers, chills, night  sweats, weight loss, nausea, vomiting, diarrhea, constipation, diplopia, blurred vision, headache, chest pain, palpitations, shortness of breath, breast pain, abdominal pain, or difficulty ambulating.  The remainder of the ten-point ROS, including  general, skin, lymph, H/N, cardiorespiratory, GI, , Neuro, Endocrine, and psychiatric is negative.     Objective:      Physical Exam      She is alert, oriented to time, place, person, pleasant, well      nourished, in no acute physical distress.                                VITAL SIGNS:  Reviewed                                      HEENT:  Normal.  There are no nasal, oral, lip, gingival, auricular, lid,    or conjunctival lesions.  Mucosae are moist and pink, and there is no        thrush.  Pupils are equal, reactive to light and accommodation.              Extraocular muscle movements are intact.  Dentition is good.  There is no frontal or maxillary tenderness.     NECK:  Supple without JVD, adenopathy, or thyromegaly.                       LUNGS:  Clear to auscultation without wheezing, rales, or rhonchi.           CARDIOVASCULAR:  Reveals an S1, S2, no murmurs, no rubs, no gallops.         ABDOMEN:  Soft, nontender, without organomegaly.  Bowel sounds are    present.                                                                     EXTREMITIES:  No cyanosis, clubbing, or edema.                               BREASTS:  she is s/p bilateral mastectomies with prostheses in place.  Her incisions have healed nicely and there is no evidence of a chest wall recurrence.                             LYMPHATIC:  There is no cervical, axillary, or supraclavicular adenopathy.   SKIN:  Warm and moist, without petechiae, rashes, induration, or ecchymoses.           NEUROLOGIC:  DTRs are 0-1+ bilaterally, symmetrical, motor function is 5/5,  and cranial nerves are  within normal limits.  Assessment:       1. History of mastectomy, bilateral    2. History of breast cancer in female    3. SERM use (selective estrogen receptor modulator)        4.    LCIS, status post bilateral mastectomies.  5.      Status post resection of an in Situ melanoma from the forehead.  6.      Probable postmenopausal status  Plan:         I had  a long discussion with Mrs. Wheat.    Since it has been 13 months from the last.  We will proceed with check an estradiol and FSH level.  If they are within the postmenopausal range she will be switched to aromatase inhibitors.  The above were explained to her.  I will see her again in 2 weeks to discuss the results.  Her multiple questions were answered to her satisfaction.  I spent approximately 40 minutes reviewing the available records and evaluating the patient, out of which over 50% of the time was spent face to face with the patient in counseling and coordinating this patient's care.      Route Chart for Scheduling    Med Onc Chart Routing  Urgent    Follow up with physician 2 weeks. Check FSH and estradiol today.  See me in 2 weeks   Follow up with [facility]    Infusion scheduling note    Injection scheduling note    Labs Other   Scheduling:  Preferred lab:  Lab interval:     Imaging    Pharmacy appointment    Other referrals

## 2024-01-29 ENCOUNTER — OFFICE VISIT (OUTPATIENT)
Dept: HEMATOLOGY/ONCOLOGY | Facility: CLINIC | Age: 54
End: 2024-01-29
Payer: COMMERCIAL

## 2024-01-29 VITALS
BODY MASS INDEX: 22.54 KG/M2 | HEART RATE: 66 BPM | TEMPERATURE: 98 F | OXYGEN SATURATION: 99 % | DIASTOLIC BLOOD PRESSURE: 56 MMHG | WEIGHT: 127.19 LBS | RESPIRATION RATE: 17 BRPM | HEIGHT: 63 IN | SYSTOLIC BLOOD PRESSURE: 115 MMHG

## 2024-01-29 DIAGNOSIS — Z85.3 HISTORY OF BREAST CANCER IN FEMALE: ICD-10-CM

## 2024-01-29 DIAGNOSIS — Z90.13 HISTORY OF MASTECTOMY, BILATERAL: Primary | ICD-10-CM

## 2024-01-29 DIAGNOSIS — Z79.810 SERM USE (SELECTIVE ESTROGEN RECEPTOR MODULATOR): ICD-10-CM

## 2024-01-29 PROCEDURE — 99999 PR PBB SHADOW E&M-EST. PATIENT-LVL III: CPT | Mod: PBBFAC,,, | Performed by: INTERNAL MEDICINE

## 2024-01-29 PROCEDURE — 3078F DIAST BP <80 MM HG: CPT | Mod: CPTII,S$GLB,, | Performed by: INTERNAL MEDICINE

## 2024-01-29 PROCEDURE — 3074F SYST BP LT 130 MM HG: CPT | Mod: CPTII,S$GLB,, | Performed by: INTERNAL MEDICINE

## 2024-01-29 PROCEDURE — 1159F MED LIST DOCD IN RCRD: CPT | Mod: CPTII,S$GLB,, | Performed by: INTERNAL MEDICINE

## 2024-01-29 PROCEDURE — 3008F BODY MASS INDEX DOCD: CPT | Mod: CPTII,S$GLB,, | Performed by: INTERNAL MEDICINE

## 2024-01-29 PROCEDURE — 1160F RVW MEDS BY RX/DR IN RCRD: CPT | Mod: CPTII,S$GLB,, | Performed by: INTERNAL MEDICINE

## 2024-01-29 PROCEDURE — 99214 OFFICE O/P EST MOD 30 MIN: CPT | Mod: S$GLB,,, | Performed by: INTERNAL MEDICINE

## 2024-01-29 RX ORDER — ANASTROZOLE 1 MG/1
1 TABLET ORAL DAILY
Qty: 90 TABLET | Refills: 2 | Status: SHIPPED | OUTPATIENT
Start: 2024-01-29 | End: 2024-01-29 | Stop reason: SDUPTHER

## 2024-01-29 RX ORDER — ANASTROZOLE 1 MG/1
1 TABLET ORAL DAILY
Qty: 90 TABLET | Refills: 2 | Status: SHIPPED | OUTPATIENT
Start: 2024-01-29 | End: 2025-01-28

## 2024-01-29 NOTE — PROGRESS NOTES
Subjective:       Patient ID: Roberta Wheat is a 53 y.o. female.    Chief Complaint: No chief complaint on file.      HPI     Mrs. Wheat returns today for follow-up.  I had last seen her on January 11, 2023 and had checked her menopausal status.  Of note, she is 14 months from her last period.  She has been on tamoxifen since April 2018 and has tolerated it well so far.  Her estradiol is less than 10 pg/mL, however, her FSH is not on the postmenopausal range at 17 mIU/mL.  Of note, her breast cancer index test had shown her risk of recurrence at 7.3% with 5 years of adjuvant hormonal therapy versus 2.5-4.1% with 10 years of adjuvant hormonal therapy, hence my recommendation to extend her treatment with the tamoxifen.    At the time of her mastectomies she had a 1.2 cm carcinoma with mixed ductal and lobular features, while two sentinel nodes were negative.  There was LCIS in the nipple duct, and the nipples were removed.  Resection margins were clear.     Briefly, she is a 53-year-old  female who underwent bilateral mastectomies for a 12 mm mixed lobular and infiltrating ductal carcinoma that was ER strongly positive, ID positive, and HER-2 negative.  Ki-67 was 30%.  Her Oncotype score was 17.    She states that her last period was in early December 2022 and had prompted a D&C by Dr. Morris that showed normal proliferative endometrium.      On April 22nd 2021 she underwent a resection of a lesion in the forehead that ended up being an in Situ melanoma.  Resection margins were clear.  She is going to [facility] every for 4 months for skin surveillance visits.    Review of Systems      Overall she feels OK, and she has no significant complaints today other than mild arthritic pains.  She is scheduled for colonoscopy next week.  ECOG PS is 0.  As mentioned above, her LMP was in December 2022.  She still experiences occasional hot flashes but she denies any depression, easy bruising, fevers, chills,  night  sweats, weight loss, nausea, vomiting, diarrhea, constipation, diplopia, blurred vision, headache, chest pain, palpitations, shortness of breath, breast pain, abdominal pain, or difficulty ambulating.  The remainder of the ten-point ROS, including general, skin, lymph, H/N, cardiorespiratory, GI, , Neuro, Endocrine, and psychiatric is negative.     Objective:      Physical Exam      Deferred  Assessment:       1. History of mastectomy, bilateral    2. History of breast cancer in female        4.    LCIS, status post bilateral mastectomies.  5.      Status post resection of an in Situ melanoma from the forehead.  6.      Probable postmenopausal status  Plan:         I had a long discussion with Mrs. Wheat.    Since it has been 13 months from the last menstrual period and she has a estradiol level less than 10, I consider her to be postmenopausal even though the FSH is not as high as one would expect.  The pros and cons of adjuvant hormonal therapy with anastrazole were discussed in detail; she was given a prescription for anastrazole 1 mg tablets with instructions to take one per day, and she will return to see me in early MArch with a DXA scan.  Her questions were answered to her satisfaction.    I spent approximately 30 minutes reviewing the available records and evaluating the patient, out of which over 50% of the time was spent face to face with the patient in counseling and coordinating this patient's care.      Route Chart for Scheduling    Med Onc Chart Routing      Follow up with physician . See me early March with a DXA scan one day prior.  She would like an early morning appointment   Follow up with HON[facility]    Infusion scheduling note    Injection scheduling note    Labs    Imaging DXA scan      Pharmacy appointment    Other referrals

## 2024-01-30 ENCOUNTER — TELEPHONE (OUTPATIENT)
Dept: ENDOSCOPY | Facility: HOSPITAL | Age: 54
End: 2024-01-30
Payer: COMMERCIAL

## 2024-02-06 ENCOUNTER — ANESTHESIA (OUTPATIENT)
Dept: ENDOSCOPY | Facility: HOSPITAL | Age: 54
End: 2024-02-06
Payer: COMMERCIAL

## 2024-02-06 ENCOUNTER — ANESTHESIA EVENT (OUTPATIENT)
Dept: ENDOSCOPY | Facility: HOSPITAL | Age: 54
End: 2024-02-06
Payer: COMMERCIAL

## 2024-02-06 ENCOUNTER — HOSPITAL ENCOUNTER (OUTPATIENT)
Facility: HOSPITAL | Age: 54
Discharge: HOME | End: 2024-02-06
Attending: INTERNAL MEDICINE | Admitting: INTERNAL MEDICINE
Payer: COMMERCIAL

## 2024-02-06 VITALS
SYSTOLIC BLOOD PRESSURE: 130 MMHG | OXYGEN SATURATION: 97 % | BODY MASS INDEX: 20.49 KG/M2 | TEMPERATURE: 96 F | RESPIRATION RATE: 13 BRPM | HEART RATE: 69 BPM | DIASTOLIC BLOOD PRESSURE: 67 MMHG | WEIGHT: 120 LBS | HEIGHT: 64 IN

## 2024-02-06 DIAGNOSIS — D12.6 COLON ADENOMA: ICD-10-CM

## 2024-02-06 PROCEDURE — 37000009 HC ANESTHESIA EA ADD 15 MINS: Performed by: INTERNAL MEDICINE

## 2024-02-06 PROCEDURE — 45380 COLONOSCOPY AND BIOPSY: CPT | Mod: PT | Performed by: INTERNAL MEDICINE

## 2024-02-06 PROCEDURE — 27201012 HC FORCEPS, HOT/COLD, DISP: Performed by: INTERNAL MEDICINE

## 2024-02-06 PROCEDURE — 88305 TISSUE EXAM BY PATHOLOGIST: CPT | Mod: 26,,, | Performed by: PATHOLOGY

## 2024-02-06 PROCEDURE — 45380 COLONOSCOPY AND BIOPSY: CPT | Mod: 33,,, | Performed by: INTERNAL MEDICINE

## 2024-02-06 PROCEDURE — 25000003 PHARM REV CODE 250: Performed by: NURSE ANESTHETIST, CERTIFIED REGISTERED

## 2024-02-06 PROCEDURE — E9220 PRA ENDO ANESTHESIA: HCPCS | Mod: 33,,, | Performed by: NURSE ANESTHETIST, CERTIFIED REGISTERED

## 2024-02-06 PROCEDURE — 63600175 PHARM REV CODE 636 W HCPCS: Performed by: NURSE ANESTHETIST, CERTIFIED REGISTERED

## 2024-02-06 PROCEDURE — 37000008 HC ANESTHESIA 1ST 15 MINUTES: Performed by: INTERNAL MEDICINE

## 2024-02-06 PROCEDURE — 88305 TISSUE EXAM BY PATHOLOGIST: CPT | Performed by: PATHOLOGY

## 2024-02-06 RX ORDER — SODIUM CHLORIDE 9 MG/ML
INJECTION, SOLUTION INTRAVENOUS CONTINUOUS
Status: DISCONTINUED | OUTPATIENT
Start: 2024-02-06 | End: 2024-02-06 | Stop reason: HOSPADM

## 2024-02-06 RX ORDER — PROPOFOL 10 MG/ML
VIAL (ML) INTRAVENOUS
Status: DISCONTINUED | OUTPATIENT
Start: 2024-02-06 | End: 2024-02-06

## 2024-02-06 RX ORDER — PROPOFOL 10 MG/ML
VIAL (ML) INTRAVENOUS CONTINUOUS PRN
Status: DISCONTINUED | OUTPATIENT
Start: 2024-02-06 | End: 2024-02-06

## 2024-02-06 RX ORDER — LIDOCAINE HYDROCHLORIDE 20 MG/ML
INJECTION INTRAVENOUS
Status: DISCONTINUED | OUTPATIENT
Start: 2024-02-06 | End: 2024-02-06

## 2024-02-06 RX ADMIN — PROPOFOL 150 MCG/KG/MIN: 10 INJECTION, EMULSION INTRAVENOUS at 10:02

## 2024-02-06 RX ADMIN — PROPOFOL 70 MG: 10 INJECTION, EMULSION INTRAVENOUS at 10:02

## 2024-02-06 RX ADMIN — SODIUM CHLORIDE: 0.9 INJECTION, SOLUTION INTRAVENOUS at 10:02

## 2024-02-06 RX ADMIN — LIDOCAINE HYDROCHLORIDE 50 MG: 20 INJECTION INTRAVENOUS at 10:02

## 2024-02-06 NOTE — TRANSFER OF CARE
"Anesthesia Transfer of Care Note    Patient: Roberta Wheat    Procedure(s) Performed: Procedure(s) (LRB):  COLONOSCOPY (N/A)    Patient location: GI    Anesthesia Type: general    Transport from OR: Transported from OR on room air with adequate spontaneous ventilation    Post pain: adequate analgesia    Post assessment: no apparent anesthetic complications    Post vital signs: stable    Level of consciousness: awake    Nausea/Vomiting: no nausea/vomiting    Complications: none    Transfer of care protocol was followed      Last vitals: Visit Vitals  BP (!) 89/61 (BP Location: Left arm, Patient Position: Sitting)   Pulse 76   Temp (!) 35.5 °C (95.9 °F) (Tympanic)   Resp 15   Ht 5' 3.5" (1.613 m)   Wt 54.4 kg (120 lb)   LMP 12/04/2022 (Exact Date)   SpO2 99%   Breastfeeding No   BMI 20.92 kg/m²     "

## 2024-02-06 NOTE — ANESTHESIA PREPROCEDURE EVALUATION
02/06/2024  Roberta Wheat is a 53 y.o., female.      Pre-op Assessment    I have reviewed the Patient Summary Reports.       I have reviewed the Medications.     Review of Systems  Anesthesia Hx:  No problems with previous Anesthesia               Denies Personal Hx of Anesthesia complications.                    Neurological:      Headaches      Dx of Headaches                                  Anesthesia Plan  Type of Anesthesia, risks & benefits discussed:    Anesthesia Type: Gen Natural Airway  Informed Consent: Informed consent signed with the Patient and all parties understand the risks and agree with anesthesia plan.  All questions answered.   ASA Score: 2  Anesthesia Plan Notes: Chart reviewed. Patient seen and examined. Anesthesia plan discussed and questions answered. E-consent signed. Samuel العلي MD    Ready For Surgery From Anesthesia Perspective.     .

## 2024-02-06 NOTE — PROVATION PATIENT INSTRUCTIONS
Discharge Summary/Instructions after an Endoscopic Procedure  Patient Name: Roberta Wheat  Patient MRN: [id number]  Patient YOB: 1970  Tuesday, February 6, 2024  Jonah Alarcon MD  Dear patient,  As a result of recent federal legislation (The Federal Cures Act), you may   receive lab or pathology results from your procedure in your [facility]   account before your physician is able to contact you. Your physician or   their representative will relay the results to you with their   recommendations at their soonest availability.  Thank you,  RESTRICTIONS:  During your procedure today, you received medications for sedation.  These   medications may affect your judgment, balance and coordination.  Therefore,   for 24 hours, you have the following restrictions:   - DO NOT drive a car, operate machinery, make legal/financial decisions,   sign important papers or drink alcohol.    ACTIVITY:  Today: no heavy lifting, straining or running due to procedural   sedation/anesthesia.  The following day: return to full activity including work.  DIET:  Eat and drink normally unless instructed otherwise.     TREATMENT FOR COMMON SIDE EFFECTS:  - Mild abdominal pain, nausea, belching, bloating or excessive gas:  rest,   eat lightly and use a heating pad.  - Sore Throat: treat with throat lozenges and/or gargle with warm salt   water.  - Because air was used during the procedure, expelling large amounts of air   from your rectum or belching is normal.  - If a bowel prep was taken, you may not have a bowel movement for 1-3 days.    This is normal.  SYMPTOMS TO WATCH FOR AND REPORT TO YOUR PHYSICIAN:  1. Abdominal pain or bloating, other than gas cramps.  2. Chest pain.  3. Back pain.  4. Signs of infection such as: chills or fever occurring within 24 hours   after the procedure.  5. Rectal bleeding, which would show as bright red, maroon, or black stools.   (A tablespoon of blood from the rectum is not serious, especially if    hemorrhoids are present.)  6. Vomiting.  7. Weakness or dizziness.  GO DIRECTLY TO THE NEAREST EMERGENCY ROOM IF YOU HAVE ANY OF THE FOLLOWING:      Difficulty breathing              Chills and/or fever over 101 F   Persistent vomiting and/or vomiting blood   Severe abdominal pain   Severe chest pain   Black, tarry stools   Bleeding- more than one tablespoon   Any other symptom or condition that you feel may need urgent attention  Your doctor recommends these additional instructions:  If any biopsies were taken, your doctors clinic will contact you in 1 to 2   weeks with any results.  - Discharge patient to home.   - Await pathology results.   - Telephone endoscopist for pathology results in 3 weeks.   - Repeat colonoscopy in 5 years for surveillance based on pathology results.     - Return to primary care physician.   - The findings and recommendations were discussed with the patient.  For questions, problems or results please call your physician - Jonah Alarcon MD at Work:  [phone].  [facility], EMERGENCY ROOM PHONE NUMBER: [phone]  IF A COMPLICATION OR EMERGENCY SITUATION ARISES AND YOU ARE UNABLE TO REACH   YOUR PHYSICIAN - GO DIRECTLY TO THE EMERGENCY ROOM.  Jonah Alarcon MD  2/6/2024 11:16:48 AM  This report has been verified and signed electronically.  Dear patient,  As a result of recent federal legislation (The Federal Cures Act), you may   receive lab or pathology results from your procedure in your [facility]   account before your physician is able to contact you. Your physician or   their representative will relay the results to you with their   recommendations at their soonest availability.  Thank you,  PROVATION

## 2024-02-06 NOTE — ANESTHESIA POSTPROCEDURE EVALUATION
Anesthesia Post Evaluation    Patient: Roberta Wheat    Procedure(s) Performed: Procedure(s) (LRB):  COLONOSCOPY (N/A)    Final Anesthesia Type: general      Level of consciousness: awake and alert  Post-procedure vital signs: reviewed and stable  Pain control: Pain has been treated.  Airway patency: patent    PONV status: Absent or treated.  Anesthetic complications: no      Cardiovascular status: hemodynamically stable  Respiratory status: unassisted  Hydration status: euvolemic                Vitals Value Taken Time   BP 89/61 02/06/24 1111   Temp 35.5 °C (95.9 °F) 02/06/24 1111   Pulse 76 02/06/24 1111   Resp 15 02/06/24 1111   SpO2 99 % 02/06/24 1111         No case tracking events are documented in the log.      Pain/Danika Score: No data recorded

## 2024-02-06 NOTE — H&P
[facility] 4[facility]  History & Physical    Subjective:      Chief Complaint/Reason for Admission:     Direct access surveillance colonoscopy for history of colon adenoma.    Roberta Wheat is a 53 y.o. female.    Past Medical History:   Diagnosis Date    Breast cancer     no sticks or bp cuffs on right arm    Melanoma     Migraine     PONV (postoperative nausea and vomiting)     SERM use (selective estrogen receptor modulator)      Past Surgical History:   Procedure Laterality Date    BREAST BIOPSY      BREAST RECONSTRUCTION       and second      SECTION      triplets    COLONOSCOPY N/A 2019    Procedure: COLONOSCOPY;  Surgeon: Jonah Alarcon MD;  Location: [facility] ENDO ([address] [place] [address]);  Service: Endoscopy;  Laterality: N/A;    DENTAL SURGERY      DILATION AND CURETTAGE OF UTERUS      miscarriage    HYSTEROSCOPY WITH DILATION AND CURETTAGE OF UTERUS N/A 2023    Procedure: HYSTEROSCOPY, WITH DILATION AND CURETTAGE OF UTERUS;  Surgeon: Mily Morris MD;  Location: [facility] OR;  Service: OB/GYN;  Laterality: N/A;    INSERTION OF BREAST IMPLANT      MASTECTOMY  2018    bilateral skin sparing mastectomy, right axillary sentinal lymph node biopsy, injection of technetium sulfur colliod into right breast     melanoma removed from forehead      NASAL FRACTURE SURGERY      right hand surgery      ROBOT-ASSISTED LAPAROSCOPIC CYSTECTOMY      TONSILLECTOMY       Social History     Tobacco Use    Smoking status: Never    Smokeless tobacco: Never   Substance Use Topics    Alcohol use: Yes     Alcohol/week: 2.0 - 3.0 standard drinks of alcohol     Types: 2 - 3 Glasses of wine per week     Comment: weekend; none 24 hr prior    Drug use: No       PTA Medications   Medication Sig    anastrozole (ARIMIDEX) 1 mg Tab Take 1 tablet (1 mg total) by mouth once daily.    ASCORBIC ACID, VITAMIN C, ORAL Take by mouth once daily.    estradioL (IMVEXXY MAINTENANCE PACK) 10 mcg  Inst Place 10 mcg vaginally twice a week.    ibuprofen (ADVIL,MOTRIN) 600 MG tablet Take 1 tablet (600 mg total) by mouth 3 (three) times daily.    meloxicam (MOBIC) 7.5 MG tablet Take 7.5 mg by mouth 2 (two) times daily.    multivitamin with folic acid 400 mcg Tab Take 1 tablet by mouth once daily.    sod sulf-pot chloride-mag sulf (SUTAB) 1.479-0.188- 0.225 gram tablet Take 12 tablets by mouth once daily. Take as directed by provider office    spironolactone (ALDACTONE) 100 MG tablet Take 100 mg by mouth once daily.    sumatriptan (IMITREX) 100 MG tablet Take once daily as needed for migraine headaches    tretinoin (RETIN-A) 0.025 % cream APPLY A PEA SIZED AMOUNT AT BEDTIME.    vitamin D (VITAMIN D3) 1000 units Tab Take by mouth once daily.     Review of patient's allergies indicates:   Allergen Reactions    Bactrim [sulfamethoxazole-trimethoprim] Hives    Sulfa (sulfonamide antibiotics) Hives        Review of Systems   Constitutional:  Negative for fever.       Objective:      Vital Signs (Most Recent)  Temp: 97 °F (36.1 °C) (02/06/24 0914)  Pulse: 70 (02/06/24 0914)  Resp: 15 (02/06/24 0914)  BP: 118/60 (02/06/24 0914)  SpO2: 99 % (02/06/24 0914)    Vital Signs Range (Last 24H):  Temp:  [97 °F (36.1 °C)]   Pulse:  [70]   Resp:  [15]   BP: (118)/(60)   SpO2:  [99 %]     Physical Exam  Cardiovascular:      Rate and Rhythm: Normal rate.   Pulmonary:      Effort: Pulmonary effort is normal.   Neurological:      Mental Status: She is alert and oriented to person, place, and time.             Assessment:      Direct access surveillance colonoscopy for history of colon adenoma.      Plan:      Direct access surveillance colonoscopy for history of colon adenoma.

## 2024-02-12 ENCOUNTER — OFFICE VISIT (OUTPATIENT)
Dept: URGENT CARE | Facility: CLINIC | Age: 54
End: 2024-02-12
Payer: COMMERCIAL

## 2024-02-12 ENCOUNTER — TELEPHONE (OUTPATIENT)
Dept: URGENT CARE | Facility: CLINIC | Age: 54
End: 2024-02-12

## 2024-02-12 VITALS
HEIGHT: 64 IN | OXYGEN SATURATION: 99 % | BODY MASS INDEX: 20.49 KG/M2 | HEART RATE: 60 BPM | RESPIRATION RATE: 18 BRPM | DIASTOLIC BLOOD PRESSURE: 69 MMHG | SYSTOLIC BLOOD PRESSURE: 109 MMHG | TEMPERATURE: 99 F | WEIGHT: 120 LBS

## 2024-02-12 DIAGNOSIS — S09.92XA INJURY OF NOSE, INITIAL ENCOUNTER: Primary | ICD-10-CM

## 2024-02-12 LAB
FINAL PATHOLOGIC DIAGNOSIS: NORMAL
GROSS: NORMAL
Lab: NORMAL

## 2024-02-12 PROCEDURE — 70160 X-RAY EXAM OF NASAL BONES: CPT | Mod: S$GLB,,, | Performed by: RADIOLOGY

## 2024-02-12 PROCEDURE — 99203 OFFICE O/P NEW LOW 30 MIN: CPT | Mod: S$GLB,,,

## 2024-02-12 NOTE — PATIENT INSTRUCTIONS
Rest and elevate the affected painful area.  Apply cold compresses intermittently as needed.  As pain recedes, begin normal activities slowly as tolerated.      Alternate tylenol and ibuprofen every 4 hours as needed for pain. Always take ibuprofen with food and water to avoid GI upset. Stop taking if you develop abdominal pain or blood in stool.       Follow up with ENT. A referral was placed for you, call [phone] to schedule appointment.

## 2024-02-12 NOTE — PROGRESS NOTES
"Subjective:      Patient ID: Roberta Wheat is a 53 y.o. female.    Vitals:  height is 5' 3.5" (1.613 m) and weight is 54.4 kg (120 lb). Her oral temperature is 98.6 °F (37 °C). Her blood pressure is 109/69 and her pulse is 60. Her respiration is 18 and oxygen saturation is 99%.     Chief Complaint: Facial Injury    Pt reports that she was hit in the face with a bag of beads she did not see coming 2 days ago. Pt reports that she has had a broken nose about 15 years ago.     Facial Injury   The incident occurred 2 days ago. Injury mechanism: parade. There was no loss of consciousness. There was no blood loss. The quality of the pain is described as aching. The pain is at a severity of 3/10. The pain is mild. The pain has been constant since the injury. Associated symptoms include tinnitus. Pertinent negatives include no blurred vision, disorientation, headaches, memory loss, numbness, vomiting or weakness. Treatments tried: iced it, advil. The treatment provided mild relief.       Constitution: Negative for chills, fatigue and fever.   HENT:  Positive for tinnitus, facial trauma and congestion. Negative for sinus pressure.    Eyes:  Negative for blurred vision.   Respiratory:  Negative for cough.    Gastrointestinal:  Negative for vomiting.   Musculoskeletal:  Negative for muscle ache.   Neurological:  Negative for dizziness, light-headedness, loss of balance, headaches, disorientation, altered mental status, loss of consciousness, numbness and tingling.   Psychiatric/Behavioral:  Negative for altered mental status and disorientation.       Objective:     Physical Exam   Constitutional: She is oriented to person, place, and time. She appears well-developed. She is cooperative.  Non-toxic appearance. She does not appear ill. No distress.   HENT:   Head: Normocephalic and atraumatic. Head is without raccoon's eyes.   Ears:   Right Ear: Hearing, tympanic membrane, external ear and ear canal normal.   Left Ear: " Hearing, tympanic membrane, external ear and ear canal normal.   Nose: Sinus tenderness (swelling/bruising) present. No mucosal edema, rhinorrhea or nasal deformity. No epistaxis. Right sinus exhibits no maxillary sinus tenderness and no frontal sinus tenderness. Left sinus exhibits no maxillary sinus tenderness and no frontal sinus tenderness.   Mouth/Throat: Uvula is midline, oropharynx is clear and moist and mucous membranes are normal. No trismus in the jaw. Normal dentition. No uvula swelling. No oropharyngeal exudate, posterior oropharyngeal edema or posterior oropharyngeal erythema.   Eyes: Conjunctivae and lids are normal. No scleral icterus.   Neck: Trachea normal and phonation normal. Neck supple. No edema present. No erythema present. No neck rigidity present.   Cardiovascular: Normal rate, regular rhythm, normal heart sounds and normal pulses.   Pulmonary/Chest: Effort normal and breath sounds normal. No respiratory distress. She has no decreased breath sounds. She has no rhonchi.   Abdominal: Normal appearance.   Musculoskeletal: Normal range of motion.         General: No deformity. Normal range of motion.   Neurological: She is alert and oriented to person, place, and time. She exhibits normal muscle tone. Coordination normal.   Skin: Skin is warm, dry, intact, not diaphoretic and not pale.   Psychiatric: Her speech is normal and behavior is normal. Judgment and thought content normal.   Nursing note and vitals reviewed.      Assessment:     1. Injury of nose, initial encounter        Plan:     EXAMINATION:  XR NASAL BONES     CLINICAL HISTORY:  Unspecified injury of nose, initial encounter     TECHNIQUE:  Three views     COMPARISON:  Nasal bone series 10/22/2009     FINDINGS:  Previous transverse fracture of the lower anterior midline nasal bone appears to have healed, now with normal anatomic alignment; however, there is persistent transverse radiolucency with sclerotic borders and no significant  overlying soft tissue swelling suggesting nonunion.  Re-injury/acute nondisplaced fracture at the same location could present similarly.  No other fracture identified.     Similar deviation of the nasal septum.  Paranasal sinuses and mastoid air cells appear clear.  No subcutaneous emphysema or radiodense retained foreign body.     Impression:     As above.        Electronically signed by: Ajith Blood MD  Date:                                            02/12/2024  Time:                                           16:09    Injury of nose, initial encounter  -     XR NASAL BONES; Future; Expected date: 02/12/2024  -     Ambulatory referral/consult to ENT            Discussed results/diagnosis/plan with patient in clinic. Strict precautions given to patient to monitor for worsening signs and symptoms. Advised to follow up with PCP or specialist.  Explained side effects of medications prescribed with patient and informed him/her to discontinue use if he/she has any side effects and to inform UC or PCP if this occurs. All questions answered. Strict ED verses clinic return precautions stressed and given in depth. Advised if symptoms worsens of fail to improve he/she should go to the Emergency Room. Discharge and follow-up instructions given verbally/printed with the patient who expressed understanding and willingness to comply with my recommendations. Patient voiced understanding and in agreement with current treatment plan. Patient exits the exam room in no acute distress. Conversant and engaged during discharge discussion, verbalized understanding.

## 2024-02-15 ENCOUNTER — PATIENT MESSAGE (OUTPATIENT)
Dept: GASTROENTEROLOGY | Facility: CLINIC | Age: 54
End: 2024-02-15
Payer: COMMERCIAL

## 2024-02-15 NOTE — PROGRESS NOTES
Susan please tell Roberta that her colonoscopy pathology was benign recommend her next surveillance colonoscopy in 5 years.      Colon, cecum, less than 1 mm polyp, biopsy:  - Colonic mucosa with lymphoid aggregate.     Comment: Interp By Leah Peña MD, Signed on 02/12/2024

## 2024-02-17 ENCOUNTER — PATIENT MESSAGE (OUTPATIENT)
Dept: HEMATOLOGY/ONCOLOGY | Facility: CLINIC | Age: 54
End: 2024-02-17
Payer: COMMERCIAL

## 2024-02-19 ENCOUNTER — OFFICE VISIT (OUTPATIENT)
Dept: OTOLARYNGOLOGY | Facility: CLINIC | Age: 54
End: 2024-02-19
Payer: COMMERCIAL

## 2024-02-19 VITALS
DIASTOLIC BLOOD PRESSURE: 75 MMHG | WEIGHT: 123.44 LBS | SYSTOLIC BLOOD PRESSURE: 111 MMHG | BODY MASS INDEX: 21.53 KG/M2 | HEART RATE: 64 BPM

## 2024-02-19 DIAGNOSIS — S09.92XA INJURY OF NOSE, INITIAL ENCOUNTER: Primary | ICD-10-CM

## 2024-02-19 PROCEDURE — 3074F SYST BP LT 130 MM HG: CPT | Mod: CPTII,S$GLB,, | Performed by: OTOLARYNGOLOGY

## 2024-02-19 PROCEDURE — 99999 PR PBB SHADOW E&M-EST. PATIENT-LVL III: CPT | Mod: PBBFAC,,, | Performed by: OTOLARYNGOLOGY

## 2024-02-19 PROCEDURE — 99203 OFFICE O/P NEW LOW 30 MIN: CPT | Mod: S$GLB,,, | Performed by: OTOLARYNGOLOGY

## 2024-02-19 PROCEDURE — 1160F RVW MEDS BY RX/DR IN RCRD: CPT | Mod: CPTII,S$GLB,, | Performed by: OTOLARYNGOLOGY

## 2024-02-19 PROCEDURE — 3008F BODY MASS INDEX DOCD: CPT | Mod: CPTII,S$GLB,, | Performed by: OTOLARYNGOLOGY

## 2024-02-19 PROCEDURE — 3078F DIAST BP <80 MM HG: CPT | Mod: CPTII,S$GLB,, | Performed by: OTOLARYNGOLOGY

## 2024-02-19 PROCEDURE — 1159F MED LIST DOCD IN RCRD: CPT | Mod: CPTII,S$GLB,, | Performed by: OTOLARYNGOLOGY

## 2024-02-19 NOTE — PROGRESS NOTES
History of Present Illness:   Roberta Wheat is a 53 y.o. year old female evaluated in the Otolaryngology-Head and Neck Surgery Clinic at [facility]. The patient was referred by ISAURO Mariee for evaluation of nasal injury.  Injury occurred 9 days ago at the pureed where bag of beads hit her face and she was wearing sunglasses.  She reports bruising along the nasal bridge.  There was no loss of consciousness.  She did not have bleeding from the nose.  She had a broken nose about 15 years ago with surgery and packing of the nose.  She has some obstruction at night but overall not significantly obstructed.  She does not note crookedness but has tenderness and swelling of the bridge.            Past Medical/Surgical History  Past Medical History:   Diagnosis Date    Breast cancer     no sticks or bp cuffs on right arm    Melanoma     Migraine     PONV (postoperative nausea and vomiting)     SERM use (selective estrogen receptor modulator)      Her  has a past surgical history that includes  section; Tonsillectomy; Breast biopsy; Colonoscopy (N/A, 2019); Breast reconstruction; Insertion of breast implant; Mastectomy (2018); melanoma removed from forehead (); Dental surgery; Nasal fracture surgery; right hand surgery; Robot-assisted laparoscopic cystectomy (); Dilation and curettage of uterus (); Hysteroscopy with dilation and curettage of uterus (N/A, 2023); and Colonoscopy (N/A, 2024).     Past Family/Social History  Her family history includes Breast cancer in her maternal grandmother.  She  reports that she has never smoked. She has never been exposed to tobacco smoke. She has never used smokeless tobacco. She reports current alcohol use of about 2.0 - 3.0 standard drinks of alcohol per week. She reports that she does not use drugs.     Medications/Allergies/Immunizations  Her current medication(s) include:   Current Outpatient Medications   Medication Sig Dispense  Refill    anastrozole (ARIMIDEX) 1 mg Tab Take 1 tablet (1 mg total) by mouth once daily. 90 tablet 2    ASCORBIC ACID, VITAMIN C, ORAL Take by mouth once daily.      meloxicam (MOBIC) 7.5 MG tablet Take 7.5 mg by mouth 2 (two) times daily.      multivitamin with folic acid 400 mcg Tab Take 1 tablet by mouth once daily.      sod sulf-pot chloride-mag sulf (SUTAB) 1.479-0.188- 0.225 gram tablet Take 12 tablets by mouth once daily. Take as directed by provider office 24 tablet 0    spironolactone (ALDACTONE) 100 MG tablet Take 100 mg by mouth once daily.      sumatriptan (IMITREX) 100 MG tablet Take once daily as needed for migraine headaches 30 tablet 0    tretinoin (RETIN-A) 0.025 % cream APPLY A PEA SIZED AMOUNT AT BEDTIME.      vitamin D (VITAMIN D3) 1000 units Tab Take by mouth once daily.      estradioL (IMVEXXY MAINTENANCE PACK) 10 mcg Inst Place 10 mcg vaginally twice a week. 8 each 11    ibuprofen (ADVIL,MOTRIN) 600 MG tablet Take 1 tablet (600 mg total) by mouth 3 (three) times daily. 30 tablet 0     No current facility-administered medications for this visit.        Allergies: Bactrim [sulfamethoxazole-trimethoprim] and Sulfa (sulfonamide antibiotics)     Immunizations:   Immunization History   Administered Date(s) Administered    COVID-19, MRNA, LN-S, PF (MODERNA FULL 0.5 ML DOSE) 03/25/2021, 04/30/2021    Influenza - Quadrivalent - PF *Preferred* (6 months and older) 10/26/2022         Review of Systems       Answers submitted by the patient for this visit:  Review of Symptoms Questionnaire  (Submitted on 2/15/2024)  None of these: Yes  None of these: Yes  None of these : Yes  None of these: Yes  None of these : Yes  None of these: Yes  None of these: Yes  None of these: Yes  None of these : Yes  None of these: Yes  None of these : Yes  None of these: Yes  None of these: Yes  None of these: Yes    All other systems are negative except for that listed in the HPI.      PHYSICAL EXAM:   Vital Signs:  BP  111/75 (BP Location: Left arm, Patient Position: Sitting)   Pulse 64   Wt 56 kg (123 lb 7.3 oz)   LMP 12/04/2022 (Exact Date)   BMI 21.53 kg/m²      General:  Well-developed, well-nourished  Communication and Voice:  Clear pitch and clarity  Hearing: Hearing adequate for verbal communication bilaterally   Inspection:  Normocephalic and atraumatic without mass or lesion  Palpation:  Facial skeleton intact without bony stepoffs  Parotid Glands:  No mass or tenderness  Facial Strength:  Facial motility symmetric and full bilaterally  Pinna:  External ear intact and fully developed  External canal:  Canal is patent with intact skin  Tympanic Membrane:  Clear and mobile  External nose:  No scar some residual edema especially at radix but no step-off  Internal Nose:  Septum intact and midline.  No edema, polyp, or rhinorrhea.  TMJ:  No pain to palpation with full mobility  Oral cavity, Lips, Teeth, and Gums:  Mucosa and teeth intact and viable, No lesions, masses or ulcers  Oropharynx: No erythema or exudate, no masses or ulcerations, non-obstructive tonsils  Nasopharynx:  No mass or lesion with intact mucosa  Hypopharynx:  Not well visualized secondary to gagging  Larynx:  Not well visualized secondary to gagging  Neck, Trachea, Lymphatics:  Midline trachea without mass or lesion, no lymphadenopathy  Thyroid:  No mass or nodularity  Eyes: No nystagmus with equal extraocular motion bilaterally  Neuro/Psych/Balance: Patient oriented and appropriate in interaction;  Appropriate mood and affect;  Gait is intact with no imbalance; Cranial nerves I-XII are intact  Respiratory effort:  Equal inspiration and expiration without stridor  Peripheral Vascular:  Warm extremities with equal pulses   RADIOLOGIC REVIEW:    I have personally reviewed the imaging and went over results with the patient likely remote nasal fracture  X-ray nose 02/12/2024  FINDINGS:  Previous transverse fracture of the lower anterior midline nasal bone  appears to have healed, now with normal anatomic alignment; however, there is persistent transverse radiolucency with sclerotic borders and no significant overlying soft tissue swelling suggesting nonunion.  Re-injury/acute nondisplaced fracture at the same location could present similarly.  No other fracture identified.     Similar deviation of the nasal septum.  Paranasal sinuses and mastoid air cells appear clear.  No subcutaneous emphysema or radiodense retained foreign body.     ASSESSMENT:   1. Injury of nose, initial encounter            PLAN:   I explained to the patient that given mechanism in history she very likely did not fracture nose.  Explained to her that x-rays are essentially worthless for nasal fractures.  The healed fracture seen on x-ray is likely the remote fracture she had 15 years ago.  Follow up as needed      I believe that Ms. Wheat has a good understanding of the issues involved and I answered all of her questions.     DISCLAIMER: This note was prepared with [employer] voice recognition transcription software. Garbled syntax, mangled pronouns, and other bizarre constructions may be attributed to that software system. While efforts were made to correct any mistakes made by this voice recognition program, some errors and/or omissions may remain in the note that were missed when the note was originally created.

## 2024-02-22 ENCOUNTER — APPOINTMENT (OUTPATIENT)
Dept: RADIOLOGY | Facility: CLINIC | Age: 54
End: 2024-02-22
Attending: INTERNAL MEDICINE
Payer: COMMERCIAL

## 2024-02-22 DIAGNOSIS — Z90.13 HISTORY OF MASTECTOMY, BILATERAL: ICD-10-CM

## 2024-02-22 DIAGNOSIS — Z85.3 HISTORY OF BREAST CANCER IN FEMALE: ICD-10-CM

## 2024-02-22 PROCEDURE — 77080 DXA BONE DENSITY AXIAL: CPT | Mod: TC,PO

## 2024-02-22 PROCEDURE — 77080 DXA BONE DENSITY AXIAL: CPT | Mod: 26,,, | Performed by: INTERNAL MEDICINE

## 2024-03-06 ENCOUNTER — OFFICE VISIT (OUTPATIENT)
Dept: HEMATOLOGY/ONCOLOGY | Facility: CLINIC | Age: 54
End: 2024-03-06
Payer: COMMERCIAL

## 2024-03-06 VITALS
RESPIRATION RATE: 16 BRPM | HEART RATE: 85 BPM | TEMPERATURE: 98 F | WEIGHT: 120.81 LBS | OXYGEN SATURATION: 97 % | SYSTOLIC BLOOD PRESSURE: 106 MMHG | BODY MASS INDEX: 21.41 KG/M2 | HEIGHT: 63 IN | DIASTOLIC BLOOD PRESSURE: 52 MMHG

## 2024-03-06 DIAGNOSIS — Z85.3 HISTORY OF BREAST CANCER IN FEMALE: Primary | ICD-10-CM

## 2024-03-06 PROBLEM — Z79.811 PROPHYLACTIC USE OF ANASTROZOLE (ARIMIDEX): Status: ACTIVE | Noted: 2018-05-18

## 2024-03-06 PROCEDURE — 3078F DIAST BP <80 MM HG: CPT | Mod: CPTII,S$GLB,, | Performed by: INTERNAL MEDICINE

## 2024-03-06 PROCEDURE — 3074F SYST BP LT 130 MM HG: CPT | Mod: CPTII,S$GLB,, | Performed by: INTERNAL MEDICINE

## 2024-03-06 PROCEDURE — 99999 PR PBB SHADOW E&M-EST. PATIENT-LVL IV: CPT | Mod: PBBFAC,,, | Performed by: INTERNAL MEDICINE

## 2024-03-06 PROCEDURE — 1159F MED LIST DOCD IN RCRD: CPT | Mod: CPTII,S$GLB,, | Performed by: INTERNAL MEDICINE

## 2024-03-06 PROCEDURE — 99213 OFFICE O/P EST LOW 20 MIN: CPT | Mod: S$GLB,,, | Performed by: INTERNAL MEDICINE

## 2024-03-06 PROCEDURE — 1160F RVW MEDS BY RX/DR IN RCRD: CPT | Mod: CPTII,S$GLB,, | Performed by: INTERNAL MEDICINE

## 2024-03-06 PROCEDURE — 3008F BODY MASS INDEX DOCD: CPT | Mod: CPTII,S$GLB,, | Performed by: INTERNAL MEDICINE

## 2024-03-06 NOTE — PROGRESS NOTES
Subjective:       Patient ID: Roberta Wheat is a 54 y.o. female.    Chief Complaint: No chief complaint on file.      HPI     Mrs. Wheat returns today for follow-up.  She did not start the anastrazole until 2 days ago because she was waiting to get her bone density scan.  Prior to that she had been on tamoxifen since April of 2018.  Her estradiol was less than 10 pg/mL, and she was 13 months from her last period, hence the decision to switch her to AIs.  Of note, her breast cancer index test had shown her risk of recurrence at 7.3% with 5 years of adjuvant hormonal therapy versus 2.5-4.1% with 10 years of adjuvant hormonal therapy, hence my recommendation to extend her treatment with the tamoxifen.    At the time of her mastectomies she had a 1.2 cm carcinoma with mixed ductal and lobular features, while two sentinel nodes were negative.  There was LCIS in the nipple duct, and the nipples were removed.  Resection margins were clear.     Briefly, she is a 54-year-old  female who underwent bilateral mastectomies for a 12 mm mixed lobular and infiltrating ductal carcinoma that was ER strongly positive, MI positive, and HER-2 negative.  Ki-67 was 30%.  Her Oncotype score was 17.    She states that her last period was in early December 2022 and had prompted a D&C by Dr. Morris that showed normal proliferative endometrium.      On April 22nd 2021 she underwent a resection of a lesion in the forehead that ended up being an in Situ melanoma.  Resection margins were clear.  She is going to [facility] every for 4 months for skin surveillance visits.  In early February she underwent a colonoscopy and was found to have a small sessile cecal polyp.  She also had diverticulosis in the descending colon sigmoid colon and rectosigmoid colon.      Review of Systems      Overall she feels OK, and she has no significant complaints today other than mild arthritic pains.  She is concerned about a new slightly  erythematous lesion on her face for which she has seeing a dermatologist later today.  She is scheduled for colonoscopy next week.  ECOG PS is 0.  As mentioned above, her LMP was in December 2022.  She still experiences occasional hot flashes but she denies any depression, easy bruising, fevers, chills, night  sweats, weight loss, nausea, vomiting, diarrhea, constipation, diplopia, blurred vision, headache, chest pain, palpitations, shortness of breath, breast pain, abdominal pain, or difficulty ambulating.  The remainder of the ten-point ROS, including general, skin, lymph, H/N, cardiorespiratory, GI, , Neuro, Endocrine, and psychiatric is negative.     Objective:      Physical Exam      She is alert, oriented to time, place, person, pleasant, well      nourished, in no acute physical distress.                                    VITAL SIGNS:  Reviewed                                      HEENT:  Normal.  There are no nasal, oral, lip, gingival, auricular, lid,    or conjunctival lesions.  Mucosae are moist and pink, and there is no        thrush.  Pupils are equal, reactive to light and accommodation.              Extraocular muscle movements are intact.  Dentition is good.  There is no frontal or maxillary tenderness.    There is a small flat slightly erythematous lesion over the right zygoma.                                   NECK:  Supple without JVD, adenopathy, or thyromegaly.                       LUNGS:  Clear to auscultation without wheezing, rales, or rhonchi.           CARDIOVASCULAR:  Reveals an S1, S2, no murmurs, no rubs, no gallops.         ABDOMEN:  Soft, nontender, without organomegaly.  Bowel sounds are    present.                                                                     EXTREMITIES:  No cyanosis, clubbing, or edema.                               BREASTS:  Defer                                     LYMPHATIC:  There is no cervical, axillary, or supraclavicular adenopathy.   SKIN:  Warm  and moist, without petechiae, rashes, induration, or ecchymoses.           NEUROLOGIC:  DTRs are 0-1+ bilaterally, symmetrical, motor function is 5/5,  and cranial nerves are  within normal limits.  Assessment:       1. SERM use (selective estrogen receptor modulator)    2. History of breast cancer in female        4.    LCIS, status post bilateral mastectomies.  5.      Status post resection of an in Situ melanoma from the forehead.  6.      Small facial lesion as described above.  Plan:         I had a long discussion with Mrs. Wheat.    I asked her to take calcium 1000 mg a day and vitamin-D 800 mg daily.  I also recommended weight-bearing exercise.  She will remain on anastrozole and I will see her again in 6 weeks for follow-up.  Finally, she may follow with a dermatologist later today    I spent approximately 20 minutes reviewing the available records and evaluating the patient, out of which over 50% of the time was spent face to face with the patient in counseling and coordinating this patient's care.      Route Chart for Scheduling    Med Onc Chart Routing      Follow up with physician 6 weeks. See me 6-7 weeks on a Wednesday at 7:00 a.m. or 7:30 a.m.   Follow up with [facility]    Infusion scheduling note    Injection scheduling note    Labs    Imaging    Pharmacy appointment    Other referrals

## 2024-04-23 NOTE — PROGRESS NOTES
09/30/21                            Srikanthcoral Barlow  [address] [facility] [address]    To Whom It May Concern:    This is to certify Srikanthcoral Barlow was evaluated with [facility] [address] on 09/30/21 and is excused from school on Thursday 9/30/21 to Wednesday 10/6/21.     RESTRICTIONS: none              [facility] at [address] [address]  Phone: [phone]  Fax: [phone]      Subjective:       Patient ID: Roberta Wheat is a 54 y.o. female.    Chief Complaint: No chief complaint on file.      HPI     Mrs. Wheat returns today for follow-up.  I had last seen her in early March, 2024, when she started her anastrazole.  Prior to that she had been on tamoxifen since April of 2018.  Her estradiol was less than 10 pg/mL, and she was 13 months from her last period, hence the decision to switch her to AIs.  Of note, her breast cancer index test had shown her risk of recurrence at 7.3% with 5 years of adjuvant hormonal therapy versus 2.5-4.1% with 10 years of adjuvant hormonal therapy, hence my recommendation to extend her treatment with the tamoxifen.    At the time of her mastectomies she had a 1.2 cm carcinoma with mixed ductal and lobular features, while two sentinel nodes were negative.  There was LCIS in the nipple duct, and the nipples were removed.  Resection margins were clear.     Briefly, she is a 54-year-old  female who underwent bilateral mastectomies for a 12 mm mixed lobular and infiltrating ductal carcinoma that was ER strongly positive, GA positive, and HER-2 negative.  Ki-67 was 30%.  Her Oncotype score was 17.    She states that her last period was in early December 2022 and had prompted a D&C by Dr. Morris that showed normal proliferative endometrium.      On April 22nd 2021 she underwent a resection of a lesion in the forehead that ended up being an in Situ melanoma.  Resection margins were clear.  She is going to [facility] every for 4 months for skin surveillance visits.  In early February she underwent a colonoscopy and was found to have a small sessile cecal polyp.  She also had diverticulosis in the descending colon sigmoid colon and rectosigmoid colon.      Review of Systems      Overall she feels OK, and she has no significant complaints today other than rare but quite intense hot rashes that she has been experiencing since she started the letrozole.    ECOG PS is 0.  As mentioned above, her LMP was in December 2022.  She denies any depression, easy bruising, fevers, chills, night  sweats, weight loss, nausea, vomiting, diarrhea, constipation, diplopia, blurred vision, headache, chest pain, palpitations, shortness of breath, breast pain, abdominal pain, or difficulty ambulating.  The remainder of the ten-point ROS, including general, skin, lymph, H/N, cardiorespiratory, GI, , Neuro, Endocrine, and psychiatric is negative.     Objective:      Physical Exam      She is alert, oriented to time, place, person, pleasant, well      nourished, in no acute physical distress.                                    VITAL SIGNS:  Reviewed                                      HEENT:  Normal.  There are no nasal, oral, lip, gingival, auricular, lid,    or conjunctival lesions.  Mucosae are moist and pink, and there is no        thrush.  Pupils are equal, reactive to light and accommodation.              Extraocular muscle movements are intact.  Dentition is good.  There is no frontal or maxillary tenderness.                                    NECK:  Supple without JVD, adenopathy, or thyromegaly.                       LUNGS:  Clear to auscultation without wheezing, rales, or rhonchi.           CARDIOVASCULAR:  Reveals an S1, S2, no murmurs, no rubs, no gallops.         ABDOMEN:  Soft, nontender, without organomegaly.  Bowel sounds are    present.                                                                     EXTREMITIES:  No cyanosis, clubbing, or edema.                               BREASTS:  Defer                                     LYMPHATIC:  There is no cervical, axillary, or supraclavicular adenopathy.   SKIN:  Warm and moist, without petechiae, rashes, induration, or ecchymoses.           NEUROLOGIC:  DTRs are 0-1+ bilaterally, symmetrical, motor function is 5/5,  and cranial nerves are  within normal limits.  Assessment:       1. History of breast cancer in female     2.     Use of anastrozole in the extended adjuvant setting    4.    LCIS, status post bilateral mastectomies.  5.      Status post resection of an in Situ melanoma from the forehead.  6.     Osteopenia approaching osteoporosis      Plan:         I had a long discussion with Mrs. Wheat.    I asked her to take calcium 1000 mg a day and vitamin-D 800 mg daily.  We also briefly discussed the options of bisphosphonates and denosumab.  I urged her to discuss with the primary care physician, and we will repeat the bone density scan in 2 years  She will remain on anastrozole for now and see me in 6 months.      I spent approximately 20 minutes reviewing the available records and 15 minutes evaluating the patient.      Route Chart for Scheduling    Med Onc Chart Routing      Follow up with physician 6 months.   Follow up with [facility]    Infusion scheduling note    Injection scheduling note    Labs    Imaging    Pharmacy appointment    Other referrals

## 2024-04-24 ENCOUNTER — OFFICE VISIT (OUTPATIENT)
Dept: HEMATOLOGY/ONCOLOGY | Facility: CLINIC | Age: 54
End: 2024-04-24
Payer: COMMERCIAL

## 2024-04-24 VITALS
SYSTOLIC BLOOD PRESSURE: 102 MMHG | TEMPERATURE: 97 F | HEIGHT: 63 IN | RESPIRATION RATE: 17 BRPM | DIASTOLIC BLOOD PRESSURE: 63 MMHG | HEART RATE: 66 BPM | BODY MASS INDEX: 22.19 KG/M2 | OXYGEN SATURATION: 100 % | WEIGHT: 125.25 LBS

## 2024-04-24 DIAGNOSIS — Z85.3 HISTORY OF BREAST CANCER IN FEMALE: Primary | ICD-10-CM

## 2024-04-24 DIAGNOSIS — Z79.811 PROPHYLACTIC USE OF ANASTROZOLE (ARIMIDEX): ICD-10-CM

## 2024-04-24 PROCEDURE — 1159F MED LIST DOCD IN RCRD: CPT | Mod: CPTII,S$GLB,, | Performed by: INTERNAL MEDICINE

## 2024-04-24 PROCEDURE — 3008F BODY MASS INDEX DOCD: CPT | Mod: CPTII,S$GLB,, | Performed by: INTERNAL MEDICINE

## 2024-04-24 PROCEDURE — 99999 PR PBB SHADOW E&M-EST. PATIENT-LVL III: CPT | Mod: PBBFAC,,, | Performed by: INTERNAL MEDICINE

## 2024-04-24 PROCEDURE — 3074F SYST BP LT 130 MM HG: CPT | Mod: CPTII,S$GLB,, | Performed by: INTERNAL MEDICINE

## 2024-04-24 PROCEDURE — 3078F DIAST BP <80 MM HG: CPT | Mod: CPTII,S$GLB,, | Performed by: INTERNAL MEDICINE

## 2024-04-24 PROCEDURE — 99213 OFFICE O/P EST LOW 20 MIN: CPT | Mod: S$GLB,,, | Performed by: INTERNAL MEDICINE

## 2024-10-09 ENCOUNTER — TELEPHONE (OUTPATIENT)
Dept: HEMATOLOGY/ONCOLOGY | Facility: CLINIC | Age: 54
End: 2024-10-09
Payer: COMMERCIAL

## 2024-10-09 ENCOUNTER — OFFICE VISIT (OUTPATIENT)
Dept: HEMATOLOGY/ONCOLOGY | Facility: CLINIC | Age: 54
End: 2024-10-09
Payer: COMMERCIAL

## 2024-10-09 VITALS
DIASTOLIC BLOOD PRESSURE: 62 MMHG | HEIGHT: 63 IN | WEIGHT: 125.88 LBS | BODY MASS INDEX: 22.3 KG/M2 | OXYGEN SATURATION: 96 % | HEART RATE: 78 BPM | TEMPERATURE: 97 F | SYSTOLIC BLOOD PRESSURE: 98 MMHG

## 2024-10-09 DIAGNOSIS — Z90.13 HISTORY OF MASTECTOMY, BILATERAL: ICD-10-CM

## 2024-10-09 DIAGNOSIS — Z85.3 HISTORY OF BREAST CANCER IN FEMALE: ICD-10-CM

## 2024-10-09 DIAGNOSIS — Z79.811 PROPHYLACTIC USE OF ANASTROZOLE (ARIMIDEX): Primary | ICD-10-CM

## 2024-10-09 PROCEDURE — 3074F SYST BP LT 130 MM HG: CPT | Mod: CPTII,S$GLB,, | Performed by: INTERNAL MEDICINE

## 2024-10-09 PROCEDURE — 3008F BODY MASS INDEX DOCD: CPT | Mod: CPTII,S$GLB,, | Performed by: INTERNAL MEDICINE

## 2024-10-09 PROCEDURE — 3078F DIAST BP <80 MM HG: CPT | Mod: CPTII,S$GLB,, | Performed by: INTERNAL MEDICINE

## 2024-10-09 PROCEDURE — 99999 PR PBB SHADOW E&M-EST. PATIENT-LVL III: CPT | Mod: PBBFAC,,, | Performed by: INTERNAL MEDICINE

## 2024-10-09 PROCEDURE — 99214 OFFICE O/P EST MOD 30 MIN: CPT | Mod: S$GLB,,, | Performed by: INTERNAL MEDICINE

## 2024-10-09 NOTE — PROGRESS NOTES
Subjective:       Patient ID: Roberta Wheat is a 54 y.o. female.    Chief Complaint: No chief complaint on file.      HPI     Mrs. Wheat returns today for follow-up.  She has been on anastrazole since early March 2024.  Prior to that she had been on tamoxifen since April of 2018.  Her estradiol was less than 10 pg/mL, and she was 13 months from her last period, hence the decision to switch her to AIs.  Of note, her breast cancer index test had shown her risk of recurrence at 7.3% with 5 years of adjuvant hormonal therapy versus 2.5-4.1% with 10 years of adjuvant hormonal therapy, hence my recommendation to extend her treatment with the tamoxifen.    At the time of her mastectomies she had a 1.2 cm carcinoma with mixed ductal and lobular features, while two sentinel nodes were negative.  There was LCIS in the nipple duct, and the nipples were removed.  Resection margins were clear.     Briefly, she is a 54-year-old  female who underwent bilateral mastectomies for a 12 mm mixed lobular and infiltrating ductal carcinoma that was ER strongly positive, MD positive, and HER-2 negative.  Ki-67 was 30%.  Her Oncotype score was 17.    On April 22nd 2021 she underwent a resection of a lesion in the forehead that ended up being an in Situ melanoma.  Resection margins were clear.  She is going to [facility] every for 4 months for skin surveillance visits.  In early February 2024 she underwent a colonoscopy and was found to have a small sessile cecal polyp.  She also had diverticulosis in the descending colon sigmoid colon and rectosigmoid colon.      Review of Systems      Overall she feels OK, and she has no significant complaints today other than rare but quite intense hot rashes that she has been experiencing since she started the letrozole.   ECOG PS is 0.  As mentioned above, her LMP was in December 2022.  She denies any depression, easy bruising, fevers, chills, night  sweats, weight loss, nausea,  vomiting, diarrhea, constipation, diplopia, blurred vision, headache, chest pain, palpitations, shortness of breath, breast pain, abdominal pain, or difficulty ambulating.  The remainder of the ten-point ROS, including general, skin, lymph, H/N, cardiorespiratory, GI, , Neuro, Endocrine, and psychiatric is negative.     Objective:      Physical Exam      She is alert, oriented to time, place, person, pleasant, well      nourished, in no acute physical distress.                                    VITAL SIGNS:  Reviewed                                      HEENT:  Normal.  There are no nasal, oral, lip, gingival, auricular, lid,    or conjunctival lesions.  Mucosae are moist and pink, and there is no        thrush.  Pupils are equal, reactive to light and accommodation.              Extraocular muscle movements are intact.  Dentition is good.  There is no frontal or maxillary tenderness.                                    NECK:  Supple without JVD, adenopathy, or thyromegaly.                       LUNGS:  Clear to auscultation without wheezing, rales, or rhonchi.           CARDIOVASCULAR:  Reveals an S1, S2, no murmurs, no rubs, no gallops.         ABDOMEN:  Soft, nontender, without organomegaly.  Bowel sounds are    present.                                                                     EXTREMITIES:  No cyanosis, clubbing, or edema.                               BREASTS:  Defer                                     LYMPHATIC:  There is no cervical, axillary, or supraclavicular adenopathy.   SKIN:  Warm and moist, without petechiae, rashes, induration, or ecchymoses.           NEUROLOGIC:  DTRs are 0-1+ bilaterally, symmetrical, motor function is 5/5,  and cranial nerves are  within normal limits.  Assessment:       1. Prophylactic use of anastrozole (Arimidex)    2. History of breast cancer in female    3. History of mastectomy, bilateral        4.    LCIS, status post bilateral mastectomies.  5.      Status  post resection of an in Situ melanoma from the forehead.  6.     Osteopenia approaching osteoporosis      Plan:         I had a long discussion with Mrs. Wheat.    She will remain on anastrozole for now and see me in 6 months.  She will complete ten years of adjuvant hormonal therapy in April 2028.  Her DXA scan will be repeated in February 2026.  She was again instructed to remain on 1,000 mg of calcium and at least 800 units of vitamin D daily    I spent approximately 20 minutes reviewing the available records and 10 minutes evaluating the patient.      Route Chart for Scheduling    Med Onc Chart Routing      Follow up with physician 6 months. she woudl like an 8:30 am appointment on a Tuesday.  Please accommodate her request   Follow up with [facility]    Infusion scheduling note    Injection scheduling note    Labs    Imaging    Pharmacy appointment    Other referrals

## 2024-10-09 NOTE — TELEPHONE ENCOUNTER
Called pt.   No answer.   Left voicemail.     Dr. Tian advised that pt can come early if able.   If not we will see at 330pm

## 2024-12-16 DIAGNOSIS — Z90.13 HISTORY OF MASTECTOMY, BILATERAL: ICD-10-CM

## 2024-12-16 DIAGNOSIS — Z85.3 HISTORY OF BREAST CANCER IN FEMALE: ICD-10-CM

## 2024-12-16 RX ORDER — ANASTROZOLE 1 MG/1
1 TABLET ORAL DAILY
Qty: 90 TABLET | Refills: 2 | Status: SHIPPED | OUTPATIENT
Start: 2024-12-16 | End: 2025-12-16

## 2025-03-19 ENCOUNTER — OFFICE VISIT (OUTPATIENT)
Dept: OBSTETRICS AND GYNECOLOGY | Facility: CLINIC | Age: 55
End: 2025-03-19
Attending: OBSTETRICS & GYNECOLOGY
Payer: COMMERCIAL

## 2025-03-19 VITALS
HEIGHT: 63 IN | DIASTOLIC BLOOD PRESSURE: 75 MMHG | HEART RATE: 73 BPM | SYSTOLIC BLOOD PRESSURE: 112 MMHG | WEIGHT: 124 LBS | BODY MASS INDEX: 21.97 KG/M2

## 2025-03-19 DIAGNOSIS — Z85.3 HISTORY OF BREAST CANCER IN FEMALE: ICD-10-CM

## 2025-03-19 DIAGNOSIS — N95.2 VAGINAL ATROPHY: ICD-10-CM

## 2025-03-19 DIAGNOSIS — Z12.4 SCREENING FOR MALIGNANT NEOPLASM OF THE CERVIX: Primary | ICD-10-CM

## 2025-03-19 DIAGNOSIS — Z01.419 ENCOUNTER FOR GYNECOLOGICAL EXAMINATION WITHOUT ABNORMAL FINDING: ICD-10-CM

## 2025-03-19 DIAGNOSIS — Z79.811 PROPHYLACTIC USE OF ANASTROZOLE (ARIMIDEX): ICD-10-CM

## 2025-03-19 PROCEDURE — 3008F BODY MASS INDEX DOCD: CPT | Mod: CPTII,S$GLB,, | Performed by: OBSTETRICS & GYNECOLOGY

## 2025-03-19 PROCEDURE — 1159F MED LIST DOCD IN RCRD: CPT | Mod: CPTII,S$GLB,, | Performed by: OBSTETRICS & GYNECOLOGY

## 2025-03-19 PROCEDURE — 3078F DIAST BP <80 MM HG: CPT | Mod: CPTII,S$GLB,, | Performed by: OBSTETRICS & GYNECOLOGY

## 2025-03-19 PROCEDURE — 3074F SYST BP LT 130 MM HG: CPT | Mod: CPTII,S$GLB,, | Performed by: OBSTETRICS & GYNECOLOGY

## 2025-03-19 PROCEDURE — 99396 PREV VISIT EST AGE 40-64: CPT | Mod: S$GLB,,, | Performed by: OBSTETRICS & GYNECOLOGY

## 2025-03-19 PROCEDURE — 99999 PR PBB SHADOW E&M-EST. PATIENT-LVL III: CPT | Mod: PBBFAC,,, | Performed by: OBSTETRICS & GYNECOLOGY

## 2025-03-19 PROCEDURE — 87624 HPV HI-RISK TYP POOLED RSLT: CPT | Performed by: OBSTETRICS & GYNECOLOGY

## 2025-03-19 RX ORDER — MELOXICAM 15 MG/1
15 TABLET ORAL DAILY PRN
COMMUNITY
Start: 2025-02-03

## 2025-03-19 RX ORDER — ESTRADIOL 10 UG/1
INSERT VAGINAL
Qty: 18 EACH | Refills: 0 | Status: SHIPPED | OUTPATIENT
Start: 2025-03-19

## 2025-03-19 RX ORDER — ESTRADIOL 10 UG/1
10 INSERT VAGINAL
Qty: 8 EACH | Refills: 11 | Status: SHIPPED | OUTPATIENT
Start: 2025-03-20

## 2025-03-19 RX ORDER — TRETINOIN 0.5 MG/G
CREAM TOPICAL
COMMUNITY
Start: 2024-10-16

## 2025-03-19 NOTE — PROGRESS NOTES
SUBJECTIVE:   55 y.o. female   for annual routine Pap and checkup. Patient's last menstrual period was 2022 (exact date)..  She reports vaginal dryness and pain with intercourse.  She has tried [place] ovules with no improvement.        Past Medical History:   Diagnosis Date    Breast cancer     no sticks or bp cuffs on right arm    Melanoma     Migraine     PONV (postoperative nausea and vomiting)     SERM use (selective estrogen receptor modulator)      Past Surgical History:   Procedure Laterality Date    BREAST BIOPSY      BREAST RECONSTRUCTION       and second      SECTION      triplets    COLONOSCOPY N/A 2019    Procedure: COLONOSCOPY;  Surgeon: Jonah Alarcon MD;  Location: [facility] (4TH FLR);  Service: Endoscopy;  Laterality: N/A;    COLONOSCOPY N/A 2024    Procedure: COLONOSCOPY;  Surgeon: Jonah Alarcon MD;  [facility]  Service: Endoscopy;  Laterality: N/A;  prep instructions sent to pt via portal   sutab  referred by ajit  -unable to [facility] for precall-MS  -portal message confirming appt--    DENTAL SURGERY      DILATION AND CURETTAGE OF UTERUS      miscarriage    HYSTEROSCOPY WITH DILATION AND CURETTAGE OF UTERUS N/A 2023    Procedure: HYSTEROSCOPY, WITH DILATION AND CURETTAGE OF UTERUS;  Surgeon: Mily Morris MD;  Location: [facility];  Service: OB/GYN;  Laterality: N/A;    INSERTION OF BREAST IMPLANT      MASTECTOMY  2018    bilateral skin sparing mastectomy, right axillary sentinal lymph node biopsy, injection of technetium sulfur colliod into right breast     melanoma removed from forehead      NASAL FRACTURE SURGERY      right hand surgery      ROBOT-ASSISTED LAPAROSCOPIC CYSTECTOMY  2000    TONSILLECTOMY       Social History[1]  Family History   Problem Relation Name Age of Onset    Breast cancer Maternal Grandmother          great granmother     Cancer Neg Hx      Colon cancer Neg Hx      Ovarian  cancer Neg Hx      Uterine cancer Neg Hx      Cervical cancer Neg Hx       OB History    Para Term  AB Living   2 1 1  1    SAB IAB Ectopic Multiple Live Births   1   1       # Outcome Date GA Lbr Mike/2nd Weight Sex Type Anes PTL Lv   2 SAB            1A Term      CS-LTranv      1B Term      CS-LTranv      1C Term      CS-LTranv              Current Medications[2]  Allergies: Bactrim [sulfamethoxazole-trimethoprim] and Sulfa (sulfonamide antibiotics)     The 10-year ASCVD risk score (Arpan FRENCH, et al., 2019) is: 1.3%    Values used to calculate the score:      Age: 55 years      Sex: Female      Is Non- : No      Diabetic: No      Tobacco smoker: No      Systolic Blood Pressure: 112 mmHg      Is BP treated: No      HDL Cholesterol: 65 mg/dL      Total Cholesterol: 178 mg/dL      ROS:  Constitutional: no weight loss, weight gain, fever, fatigue  Eyes:  No vision changes, glasses/contacts  ENT/Mouth: No ulcers, sinus problems, ears ringing, headache  Cardiovascular: No inability to lie flat, chest pain, exercise intolerance, swelling, heart palpitations  Respiratory: No wheezing, coughing blood, shortness of breath, or cough  Gastrointestinal: No diarrhea, bloody stool, nausea/vomiting, constipation, gas, hemorrhoids  Genitourinary: No blood in urine, painful urination, urgency of urination, frequency of urination, incomplete emptying, incontinence, abnormal bleeding, painful periods, heavy periods, vaginal discharge, vaginal odor, +painful intercourse, sexual problems, bleeding after intercourse.  Musculoskeletal: No muscle weakness  Skin/Breast: No painful breasts, nipple discharge, masses, rash, ulcers  Neurological: No passing out, seizures, numbness, headache  Endocrine: No diabetes, hypothyroid, hyperthyroid, hot flashes, hair loss, abnormal hair growth, acne, +osteopenia  Psychiatric: No depression, crying  Hematologic: No bruises, bleeding, swollen lymph nodes,  anemia.      Physical Exam:   Constitutional: She is oriented to person, place, and time. She appears well-developed and well-nourished.      Neck: No tracheal deviation present. No thyromegaly present.    Cardiovascular:       Exam reveals no edema.        Pulmonary/Chest: Effort normal. She exhibits no mass, no tenderness, no deformity and no retraction. Right breast exhibits no inverted nipple, no mass, no nipple discharge, no skin change, no tenderness, presence, no bleeding and no swelling. Left breast exhibits no inverted nipple, no mass, no nipple discharge, no skin change, no tenderness, presence, no bleeding and no swelling. Breasts are symmetrical.        Abdominal: Soft. She exhibits no distension and no mass. There is no abdominal tenderness. There is no rebound and no guarding. No hernia. Hernia confirmed negative in the left inguinal area.     Genitourinary:    Vagina and uterus normal.   Rectum:      No external hemorrhoid.   There is no rash, tenderness or lesion on the right labia. There is no rash, tenderness or lesion on the left labia. Cervix is normal. No no adexnal prolapse. Right adnexum displays no mass, no tenderness and no fullness. Left adnexum displays no mass, no tenderness and no fullness. No vaginal discharge, tenderness, bleeding, rectocele, cystocele or prolapse of vaginal walls in the vagina. Cervix exhibits no motion tenderness, no discharge and no friability. Uterus is not deviated.           Musculoskeletal: Normal range of motion and moves all extremeties. No edema.       Neurological: She is alert and oriented to person, place, and time.    Skin: No rash noted. No erythema. No pallor.    Psychiatric: She has a normal mood and affect. Her behavior is normal. Judgment and thought content normal.     +vaginal dryness      ASSESSMENT:   well woman  1. Screening for malignant neoplasm of the cervix  Liquid-Based Pap Smear, Screening    HPV High Risk Genotypes, PCR      2. Encounter  for gynecological examination without abnormal finding        3. History of breast cancer in female        4. Prophylactic use of anastrozole (Arimidex)        5. Vaginal atrophy            PLAN:   pap smear/HPV  Counseled her on safety and efficacy of intravaginal estrogen.  Recommend that she use vaginal estrogen ovules nightly for 2 weeks then twice weekly.  Also counseled her on the use of vaginal moisturizer and lubricant  If no improvement patient should follow up in 3 months           [1]   Social History  Socioeconomic History    Marital status:    Tobacco Use    Smoking status: Never     Passive exposure: Never    Smokeless tobacco: Never   Substance and Sexual Activity    Alcohol use: Yes     Alcohol/week: 2.0 - 3.0 standard drinks of alcohol     Types: 2 - 3 Glasses of wine per week     Comment: weekend; none 24 hr prior    Drug use: No    Sexual activity: Yes     Partners: Male     Social Drivers of Health     Food Insecurity: No Food Insecurity (4/22/2024)    Hunger Vital Sign     Worried About Running Out of Food in the Last Year: Never true     Ran Out of Food in the Last Year: Never true   Transportation Needs: No Transportation Needs (4/22/2024)    PRAPARE - Transportation     Lack of Transportation (Medical): No     Lack of Transportation (Non-Medical): No   Physical Activity: Sufficiently Active (4/22/2024)    Exercise Vital Sign     Days of Exercise per Week: 5 days     Minutes of Exercise per Session: 40 min   Stress: No Stress Concern Present (4/22/2024)    [employer] - Occupational Stress Questionnaire     Feeling of Stress : Only a little   Housing Stability: Unknown (4/22/2024)    Housing Stability Vital Sign     Unable to Pay for Housing in the Last Year: No   [2]   Current Outpatient Medications   Medication Sig Dispense Refill    anastrozole (ARIMIDEX) 1 mg Tab Take 1 tablet (1 mg total) by mouth once daily. 90 tablet 2    ASCORBIC ACID, VITAMIN C,  ORAL Take by mouth once daily.      meloxicam (MOBIC) 15 MG tablet Take 15 mg by mouth daily as needed.      multivitamin with folic acid 400 mcg Tab Take 1 tablet by mouth once daily.      spironolactone (ALDACTONE) 100 MG tablet Take 100 mg by mouth once daily.      sumatriptan (IMITREX) 100 MG tablet Take once daily as needed for migraine headaches 30 tablet 0    tretinoin (RETIN-A) 0.05 % cream SMARTSIG:sparingly Topical Every Night      vitamin D (VITAMIN D3) 1000 units Tab Take by mouth once daily.      [START ON 3/20/2025] estradioL (IMVEXXY MAINTENANCE PACK) 10 mcg Inst Place 10 mcg vaginally twice a week. 8 each 11    estradioL (IMVEXXY STARTER PACK) 10 mcg InPk Use nightly for 2 weeks then twice weekly 18 each 0     No current facility-administered medications for this visit.

## 2025-03-24 ENCOUNTER — RESULTS FOLLOW-UP (OUTPATIENT)
Dept: OBSTETRICS AND GYNECOLOGY | Facility: CLINIC | Age: 55
End: 2025-03-24

## 2025-07-01 ENCOUNTER — OFFICE VISIT (OUTPATIENT)
Dept: PRIMARY CARE CLINIC | Facility: CLINIC | Age: 55
End: 2025-07-01
Payer: COMMERCIAL

## 2025-07-01 ENCOUNTER — RESULTS FOLLOW-UP (OUTPATIENT)
Dept: PRIMARY CARE CLINIC | Facility: CLINIC | Age: 55
End: 2025-07-01

## 2025-07-01 ENCOUNTER — HOSPITAL ENCOUNTER (OUTPATIENT)
Dept: RADIOLOGY | Facility: OTHER | Age: 55
Discharge: HOME | End: 2025-07-01
Attending: FAMILY MEDICINE
Payer: COMMERCIAL

## 2025-07-01 VITALS
BODY MASS INDEX: 21.02 KG/M2 | HEIGHT: 63 IN | SYSTOLIC BLOOD PRESSURE: 120 MMHG | WEIGHT: 118.63 LBS | HEART RATE: 81 BPM | DIASTOLIC BLOOD PRESSURE: 66 MMHG | OXYGEN SATURATION: 99 %

## 2025-07-01 DIAGNOSIS — R19.05 PERIUMBILIC SWELLING, MASS OR LUMP: ICD-10-CM

## 2025-07-01 DIAGNOSIS — R10.815 PERIUMBILICAL ABDOMINAL TENDERNESS WITHOUT REBOUND TENDERNESS: ICD-10-CM

## 2025-07-01 DIAGNOSIS — K42.9 PERIUMBILICAL HERNIA: Primary | ICD-10-CM

## 2025-07-01 DIAGNOSIS — Z85.820 HISTORY OF MELANOMA EXCISION: ICD-10-CM

## 2025-07-01 DIAGNOSIS — R19.05 PERIUMBILIC SWELLING, MASS OR LUMP: Primary | ICD-10-CM

## 2025-07-01 DIAGNOSIS — Z85.3 HISTORY OF BREAST CANCER IN FEMALE: ICD-10-CM

## 2025-07-01 DIAGNOSIS — R09.82 PND (POST-NASAL DRIP): ICD-10-CM

## 2025-07-01 DIAGNOSIS — Z98.890 HISTORY OF MELANOMA EXCISION: ICD-10-CM

## 2025-07-01 DIAGNOSIS — R05.9 COUGH, UNSPECIFIED TYPE: ICD-10-CM

## 2025-07-01 PROCEDURE — 99999 PR PBB SHADOW E&M-EST. PATIENT-LVL IV: CPT | Mod: PBBFAC,,, | Performed by: FAMILY MEDICINE

## 2025-07-01 PROCEDURE — 1159F MED LIST DOCD IN RCRD: CPT | Mod: CPTII,S$GLB,, | Performed by: FAMILY MEDICINE

## 2025-07-01 PROCEDURE — 3008F BODY MASS INDEX DOCD: CPT | Mod: CPTII,S$GLB,, | Performed by: FAMILY MEDICINE

## 2025-07-01 PROCEDURE — A9698 NON-RAD CONTRAST MATERIALNOC: HCPCS | Performed by: FAMILY MEDICINE

## 2025-07-01 PROCEDURE — 99215 OFFICE O/P EST HI 40 MIN: CPT | Mod: S$GLB,,, | Performed by: FAMILY MEDICINE

## 2025-07-01 PROCEDURE — 3078F DIAST BP <80 MM HG: CPT | Mod: CPTII,S$GLB,, | Performed by: FAMILY MEDICINE

## 2025-07-01 PROCEDURE — 1160F RVW MEDS BY RX/DR IN RCRD: CPT | Mod: CPTII,S$GLB,, | Performed by: FAMILY MEDICINE

## 2025-07-01 PROCEDURE — 3074F SYST BP LT 130 MM HG: CPT | Mod: CPTII,S$GLB,, | Performed by: FAMILY MEDICINE

## 2025-07-01 PROCEDURE — 74176 CT ABD & PELVIS W/O CONTRAST: CPT | Mod: TC

## 2025-07-01 PROCEDURE — 74176 CT ABD & PELVIS W/O CONTRAST: CPT | Mod: 26,,, | Performed by: RADIOLOGY

## 2025-07-01 PROCEDURE — 25500020 PHARM REV CODE 255: Performed by: FAMILY MEDICINE

## 2025-07-01 RX ORDER — FLUTICASONE PROPIONATE 50 MCG
1 SPRAY, SUSPENSION (ML) NASAL DAILY
Qty: 16 G | Refills: 0 | Status: SHIPPED | OUTPATIENT
Start: 2025-07-01

## 2025-07-01 RX ORDER — BENZONATATE 200 MG/1
200 CAPSULE ORAL 3 TIMES DAILY PRN
Qty: 30 CAPSULE | Refills: 0 | Status: SHIPPED | OUTPATIENT
Start: 2025-07-01 | End: 2025-07-11

## 2025-07-01 RX ORDER — PROMETHAZINE HYDROCHLORIDE AND DEXTROMETHORPHAN HYDROBROMIDE 6.25; 15 MG/5ML; MG/5ML
5 SYRUP ORAL NIGHTLY PRN
Qty: 118 ML | Refills: 0 | Status: SHIPPED | OUTPATIENT
Start: 2025-07-01 | End: 2025-07-11

## 2025-07-01 RX ADMIN — IOHEXOL 1000 ML: 9 SOLUTION ORAL at 03:07

## 2025-07-01 NOTE — PROGRESS NOTES
"Subjective:       Patient ID: Roberta Wheat is a 55 y.o. female.    Chief Complaint: Sore Throat (Sore throat, cough, runny nose/ symptoms started Saturday/ ) and Abdominal Pain (Stomach pain started yesterday/ felt bloated/ felt big lump right above belly button/ tender to tough/ )    HPI 56 y/o patient with no PCP with hx of breast cancer s/p bilateral mastectomy 2018, hx of breast reconstruction, currently on Arimidex, Post menopausal (last cycle 2022), history of melanoma in situ excision on R forehead, diverticulosis, hx of colon polyps is here with sore throat and abdominal lump.     She had the feeling yesterday like her pants were too tight, she had achy abdominal pain, she did have a big diet coke and she attributed the discomfort the the coke, then later she noticed a bulge above her belly button, it was slightly tender last night when she first noticed it, now its not painful or tender unless she touches it, she denies f/n/v/heartburn/d/constipation/cp/sob/urinary sx. Weight has been stable. Appetite ok. She has sore throat and cold symptoms x 4 days, she has runny nose, pnd, dry cough, throat clearing, ear fullness, she tried Advil but not on any sinus meds, her  recently was ill with cold sx but she is not sure what, she drove home from [place] yesterday and got back from [place] a week ago. She is not sleeping well secondary to cough.   No recent antibiotic or steroid use.    Review of Systems    Objective:      /66 (BP Location: Left arm, Patient Position: Sitting)   Pulse 81   Ht 5' 3" (1.6 m)   Wt 53.8 kg (118 lb 9.7 oz)   LMP 12/04/2022 (Exact Date)   SpO2 99%   BMI 21.01 kg/m²   Physical Exam  Vitals and nursing note reviewed.   Constitutional:       Appearance: She is well-developed.   HENT:      Head: Normocephalic and atraumatic.      Right Ear: Tympanic membrane normal.      Left Ear: Tympanic membrane normal.      Mouth/Throat:      Pharynx: Posterior oropharyngeal " erythema present. No oropharyngeal exudate.   Neck:      Thyroid: No thyromegaly.   Cardiovascular:      Rate and Rhythm: Normal rate and regular rhythm.      Heart sounds: Normal heart sounds.   Pulmonary:      Effort: Pulmonary effort is normal. No respiratory distress.      Breath sounds: Normal breath sounds.   Abdominal:      General: Bowel sounds are normal.      Palpations: Abdomen is soft. There is mass.      Tenderness: There is abdominal tenderness.      Comments: R sided periumbilical lump, tender to palpation, not reducible   Musculoskeletal:      Cervical back: Normal range of motion and neck supple.      Right lower leg: No edema.      Left lower leg: No edema.   Lymphadenopathy:      Cervical: No cervical adenopathy.   Skin:     General: Skin is warm and dry.   Neurological:      Mental Status: She is alert.         Assessment:       1. Periumbilic swelling, mass or lump    2. Periumbilical abdominal tenderness without rebound tenderness    3. History of breast cancer in female    4. History of melanoma excision    5. PND (post-nasal drip)    6. Cough, unspecified type        Plan:   Roberta was seen today for sore throat and abdominal pain.    Diagnoses and all orders for this visit:    Periumbilic swelling, mass or lump  -     CT Abdomen Pelvis  Without Contrast; Future    Periumbilical abdominal tenderness without rebound tenderness  -     CT Abdomen Pelvis  Without Contrast; Future  -     CBC Auto Differential; Future  -     Comprehensive Metabolic Panel; Future  -     AMYLASE; Future  -     Lipase; Future    History of breast cancer in female    History of melanoma excision    PND (post-nasal drip)  -     fluticasone propionate (FLONASE) 50 mcg/actuation nasal spray; 1 spray (50 mcg total) by Each Nostril route once daily.  -     benzonatate (TESSALON) 200 MG capsule; Take 1 capsule (200 mg total) by mouth 3 (three) times daily as needed.  -     promethazine-dextromethorphan (PROMETHAZINE-DM)  6.25-15 mg/5 mL Syrp; Take 5 mLs by mouth nightly as needed (cough).    Cough, unspecified type  -     fluticasone propionate (FLONASE) 50 mcg/actuation nasal spray; 1 spray (50 mcg total) by Each Nostril route once daily.  -     benzonatate (TESSALON) 200 MG capsule; Take 1 capsule (200 mg total) by mouth 3 (three) times daily as needed.  -     promethazine-dextromethorphan (PROMETHAZINE-DM) 6.25-15 mg/5 mL Syrp; Take 5 mLs by mouth nightly as needed (cough).    Time spent with this patient was40 minutes.  Patient was counseled for over 50% of the visit.

## 2025-07-03 ENCOUNTER — TELEPHONE (OUTPATIENT)
Dept: PRIMARY CARE CLINIC | Facility: CLINIC | Age: 55
End: 2025-07-03
Payer: COMMERCIAL

## 2025-07-03 NOTE — TELEPHONE ENCOUNTER
Please contact pt to scheduled general surgery appt, or route to department so they can help schedule.

## 2025-07-10 ENCOUNTER — E-VISIT (OUTPATIENT)
Dept: PRIMARY CARE CLINIC | Facility: CLINIC | Age: 55
End: 2025-07-10
Payer: COMMERCIAL

## 2025-07-10 DIAGNOSIS — J32.9 SINUSITIS, UNSPECIFIED CHRONICITY, UNSPECIFIED LOCATION: Primary | ICD-10-CM

## 2025-07-14 RX ORDER — AMOXICILLIN AND CLAVULANATE POTASSIUM 875; 125 MG/1; MG/1
1 TABLET, FILM COATED ORAL EVERY 12 HOURS
Qty: 20 TABLET | Refills: 0 | Status: SHIPPED | OUTPATIENT
Start: 2025-07-14 | End: 2025-07-24

## 2025-07-14 NOTE — PROGRESS NOTES
Subjective:       Patient ID: Roberta Wheat is a 55 y.o. female.    Chief Complaint: General Illness (Entered automatically based on patient selection in [employer].)    HPI    Patient ID: Roberta Wheat is a 55 y.o. female.    Chief Complaint: General Illness (Entered automatically based on patient selection in [employer].)    The patient initiated a request through [employer] on 7/10/2025 for evaluation and management with a chief complaint of General Illness (Entered automatically based on patient selection in [employer].)     I evaluated the questionnaire submission on 7/14/25.    [facility] Evisit [facility]-Cough And Cold    7/10/2025  8:07 PM CDT - Filed by Patient   What do you need help with? Cough, Cold, Sore Throat   Do you agree to participate in an E-Visit? Yes   If you have any of the following symptoms, go to your local emergency room or call 911: I acknowledge   What is the main issue you would like addressed today? Still have cold from sat june 28 now teeth hurt think need antibiotics   Do you think you might have COVID-19 or the Flu? (COVID-19, Flu, No, Not sure) No   What symptoms do you currently have?(Chills, Cough, Diarrhea, Fatigue, Headache, Stuffy nose, Loss of taste or smell, Muscle or body aches, Nausea, Runny nose, Sore throat, Face and nose pain, Ear pain, Neck pain, None) Cough;  Fatigue;  Headache;  Stuffy nose;  Face and nose pain   Describe your cough:(Contains mucus, Comes and goes, Dry, Does not stop, Interferes with daily activities ) Contains mucus;  Comes and goes   Describe your mucus:(Bloody, Green, Yellow, Clear, White, Foamy, Thick, Thin, Foul smelling) Clear   Have you ever smoked?(Smoked in the past, Currently smoke, Never smoked) Never smoked   What has been the range of your fever?(Below 100.4, 100.4 or higher, Not sure) No   When did your concern begin? 6/28/2025   In the last two weeks, have you been in close contact with someone who has COVID-19, the Flu, or strep  throat? Not sure   What have you tried to help your symptoms? Cold medication;  Drinking more fluids;  Hot shower;  Vitamin C   On a scale of 1-10, where 10 is the worst you can imagine, how severe are your symptoms? (range: 1 - 10) 5   Have your symptoms changed since they first started?(Improved, Worsened, No change) Worsened   Do you have transportation to an [facility] location to get tested for COVID-19? Yes   Provide any additional information you feel is important. Think its sinus infection now bc my teeth and face hurt   Please attach any relevant images or files    Are you able to take your vitals? No         Encounter Diagnosis   Name Primary?    Sinusitis, unspecified chronicity, unspecified location Yes        No orders of the defined types were placed in this encounter.     Medications Ordered This Encounter   Medications    amoxicillin-clavulanate 875-125mg (AUGMENTIN) 875-125 mg per tablet     Sig: Take 1 tablet by mouth every 12 (twelve) hours. for 10 days     Dispense:  20 tablet     Refill:  0        No follow-ups on file.      E-Visit Time Tracking:                        Review of Systems    Objective:      [facility] 12/04/2022 (Exact Date)   Physical Exam    Assessment:       1. Sinusitis, unspecified chronicity, unspecified location        Plan:   Roberta was seen today for general illness.    Diagnoses and all orders for this visit:    Sinusitis, unspecified chronicity, unspecified location  -     amoxicillin-clavulanate 875-125mg (AUGMENTIN) 875-125 mg per tablet; Take 1 tablet by mouth every 12 (twelve) hours. for 10 days

## 2025-07-16 ENCOUNTER — OFFICE VISIT (OUTPATIENT)
Dept: SURGERY | Facility: CLINIC | Age: 55
End: 2025-07-16
Payer: COMMERCIAL

## 2025-07-16 VITALS
BODY MASS INDEX: 20.79 KG/M2 | SYSTOLIC BLOOD PRESSURE: 116 MMHG | DIASTOLIC BLOOD PRESSURE: 54 MMHG | HEART RATE: 70 BPM | HEIGHT: 63 IN | WEIGHT: 117.31 LBS

## 2025-07-16 DIAGNOSIS — K42.9 PERIUMBILICAL HERNIA: Primary | ICD-10-CM

## 2025-07-16 PROCEDURE — 99999 PR PBB SHADOW E&M-EST. PATIENT-LVL IV: CPT | Mod: PBBFAC,,, | Performed by: SURGERY

## 2025-07-16 PROCEDURE — 99204 OFFICE O/P NEW MOD 45 MIN: CPT | Mod: S$GLB,,, | Performed by: SURGERY

## 2025-07-16 PROCEDURE — 1159F MED LIST DOCD IN RCRD: CPT | Mod: CPTII,S$GLB,, | Performed by: SURGERY

## 2025-07-16 PROCEDURE — 3078F DIAST BP <80 MM HG: CPT | Mod: CPTII,S$GLB,, | Performed by: SURGERY

## 2025-07-16 PROCEDURE — 3008F BODY MASS INDEX DOCD: CPT | Mod: CPTII,S$GLB,, | Performed by: SURGERY

## 2025-07-16 PROCEDURE — 3074F SYST BP LT 130 MM HG: CPT | Mod: CPTII,S$GLB,, | Performed by: SURGERY

## 2025-07-16 PROCEDURE — 1160F RVW MEDS BY RX/DR IN RCRD: CPT | Mod: CPTII,S$GLB,, | Performed by: SURGERY

## 2025-07-16 NOTE — PROGRESS NOTES
History & Physical    SUBJECTIVE:     History of Present Illness:  Patient is a 55 y.o. female presents with recent onset of periumbilical pain and bulge.  The patient states that several weeks ago she had a cold and was coughing after this she began to have some pain in a bulge supraumbilical region.  This seemed to resolve for some time but then and there went a body pump class and again felt some pain in a bulge in the area.  Of note she is also wearing pants these days and is unsure if this was contributing.  She denies any nausea or vomiting.  She still has some discomfort in the area      Review of patient's allergies indicates:   Allergen Reactions    Sulfa (sulfonamide antibiotics) Hives    Sulfamethoxazole-trimethoprim Hives and Other (See Comments)       Current Medications[1]    Past Medical History:   Diagnosis Date    Breast cancer     no sticks or bp cuffs on right arm    Melanoma     Migraine     PONV (postoperative nausea and vomiting)     SERM use (selective estrogen receptor modulator)      Past Surgical History:   Procedure Laterality Date    BREAST BIOPSY      BREAST RECONSTRUCTION       and second 2020     SECTION      triplets    COLONOSCOPY N/A 2019    Procedure: COLONOSCOPY;  Surgeon: Jonah Alarcon MD;  Location: [facility] ([address] [place] [address]);  Service: Endoscopy;  Laterality: N/A;    COLONOSCOPY N/A 2024    Procedure: COLONOSCOPY;  Surgeon: Jonah Alarcon MD;  Location: [facility] ([address] [place] [address]);  Service: Endoscopy;  Laterality: N/A;  prep instructions sent to pt via portal   sutab  referred by ajit  -unable to [facility] for precall-MS  -portal message confirming appt--    DENTAL SURGERY      DILATION AND CURETTAGE OF UTERUS  2000    miscarriage    HYSTEROSCOPY WITH DILATION AND CURETTAGE OF UTERUS N/A 2023    Procedure: HYSTEROSCOPY, WITH DILATION AND CURETTAGE OF UTERUS;  Surgeon: Mily Morris MD;  Location: [facility];  Service: OB/GYN;  Laterality:  "N/A;    INSERTION OF BREAST IMPLANT      MASTECTOMY  03/21/2018    bilateral skin sparing mastectomy, right axillary sentinal lymph node biopsy, injection of technetium sulfur colliod into right breast     melanoma removed from forehead  2021    NASAL FRACTURE SURGERY      right hand surgery      ROBOT-ASSISTED LAPAROSCOPIC CYSTECTOMY  2000    TONSILLECTOMY       Family History   Problem Relation Name Age of Onset    Breast cancer Maternal Grandmother          great granmother     Cancer Neg Hx      Colon cancer Neg Hx      Ovarian cancer Neg Hx      Uterine cancer Neg Hx      Cervical cancer Neg Hx       Social History[2]     Review of Systems:  Review of Systems   Constitutional:  Negative for activity change, appetite change, chills, diaphoresis, fatigue and fever.   HENT:  Negative for congestion, sinus pressure, sneezing and sore throat.    Eyes:  Negative for pain, discharge, redness, itching and visual disturbance.   Respiratory:  Negative for apnea, cough, choking, chest tightness and shortness of breath.    Cardiovascular:  Negative for chest pain, palpitations and leg swelling.   Gastrointestinal:  Positive for abdominal pain (Mild around atilio umbilical bulge). Negative for abdominal distention, constipation, diarrhea, nausea and vomiting.   Musculoskeletal:  Negative for arthralgias, back pain and gait problem.   Skin:  Negative for color change, pallor and rash.   Neurological:  Negative for dizziness, facial asymmetry, light-headedness and headaches.   Psychiatric/Behavioral:  Negative for agitation, behavioral problems and confusion.        OBJECTIVE:     Vital Signs (Most Recent)  Pulse: 70 (07/16/25 0852)  BP: (!) 116/54 (07/16/25 0852)  5' 3" (1.6 m)  53.2 kg (117 lb 4.6 oz)     Physical Exam:  Physical Exam  Constitutional:       General: She is not in acute distress.     Appearance: Normal appearance. She is not diaphoretic.   HENT:      Head: Normocephalic and atraumatic.      Right Ear: External " ear normal.      Left Ear: External ear normal.   Eyes:      General: No scleral icterus.        Right eye: No discharge.         Left eye: No discharge.   Cardiovascular:      Rate and Rhythm: Normal rate and regular rhythm.   Pulmonary:      Effort: Pulmonary effort is normal. No respiratory distress.   Abdominal:      General: There is no distension.      Palpations: Abdomen is soft. There is no mass.      Tenderness: There is no abdominal tenderness. There is no guarding or rebound.      Hernia: A hernia (small supra umbilical hernia.  mild tenderness) is present.   Musculoskeletal:         General: Normal range of motion.   Skin:     General: Skin is warm and dry.      Coloration: Skin is not pale.      Findings: No erythema or rash.   Neurological:      Mental Status: She is alert and oriented to person, place, and time.   Psychiatric:         Mood and Affect: Mood normal.         Behavior: Behavior normal.         Thought Content: Thought content normal.         Judgment: Judgment normal.           ASSESSMENT/PLAN:     Supra Umbilical hernia    PLAN:Plan     To OR for repair  Will obtain consent the am of surgery  Call or return prior to surgery with questions or concerns       Abad Gould MD         [1]   Current Outpatient Medications   Medication Sig Dispense Refill    amoxicillin-clavulanate 875-125mg (AUGMENTIN) 875-125 mg per tablet Take 1 tablet by mouth every 12 (twelve) hours. for 10 days 20 tablet 0    anastrozole (ARIMIDEX) 1 mg Tab Take 1 tablet (1 mg total) by mouth once daily. 90 tablet 2    ASCORBIC ACID, VITAMIN C, ORAL Take by mouth once daily.      estradioL (IMVEXXY STARTER PACK) 10 mcg InPk Use nightly for 2 weeks then twice weekly 18 each 0    fluticasone propionate (FLONASE) 50 mcg/actuation nasal spray 1 spray (50 mcg total) by Each Nostril route once daily. 16 g 0    meloxicam (MOBIC) 15 MG tablet Take 15 mg by mouth daily as needed.      multivitamin with folic acid 400 mcg Tab  Take 1 tablet by mouth once daily.      spironolactone (ALDACTONE) 100 MG tablet Take 100 mg by mouth once daily.      sumatriptan (IMITREX) 100 MG tablet Take once daily as needed for migraine headaches 30 tablet 0    tretinoin (RETIN-A) 0.05 % cream SMARTSIG:sparingly Topical Every Night      vitamin D (VITAMIN D3) 1000 units Tab Take by mouth once daily.      estradioL (IMVEXXY MAINTENANCE PACK) 10 mcg Inst Place 10 mcg vaginally twice a week. 8 each 11     No current facility-administered medications for this visit.   [2]   Social History  Tobacco Use    Smoking status: Never     Passive exposure: Never    Smokeless tobacco: Never   Substance Use Topics    Alcohol use: Yes     Alcohol/week: 2.0 - 3.0 standard drinks of alcohol     Types: 2 - 3 Glasses of wine per week     Comment: weekend; none 24 hr prior    Drug use: No

## 2025-08-11 ENCOUNTER — OFFICE VISIT (OUTPATIENT)
Dept: PRIMARY CARE CLINIC | Facility: CLINIC | Age: 55
End: 2025-08-11
Payer: COMMERCIAL

## 2025-08-11 VITALS
HEIGHT: 63 IN | OXYGEN SATURATION: 99 % | BODY MASS INDEX: 20.86 KG/M2 | WEIGHT: 117.75 LBS | SYSTOLIC BLOOD PRESSURE: 118 MMHG | DIASTOLIC BLOOD PRESSURE: 60 MMHG | HEART RATE: 66 BPM

## 2025-08-11 DIAGNOSIS — Z85.820 HISTORY OF MELANOMA EXCISION: ICD-10-CM

## 2025-08-11 DIAGNOSIS — K57.90 DIVERTICULOSIS: ICD-10-CM

## 2025-08-11 DIAGNOSIS — Z90.13 HISTORY OF MASTECTOMY, BILATERAL: ICD-10-CM

## 2025-08-11 DIAGNOSIS — Z98.890 HISTORY OF MELANOMA EXCISION: ICD-10-CM

## 2025-08-11 DIAGNOSIS — Z13.220 ENCOUNTER FOR LIPID SCREENING FOR CARDIOVASCULAR DISEASE: ICD-10-CM

## 2025-08-11 DIAGNOSIS — M85.80 OSTEOPENIA, UNSPECIFIED LOCATION: ICD-10-CM

## 2025-08-11 DIAGNOSIS — M54.41 CHRONIC RIGHT-SIDED LOW BACK PAIN WITH RIGHT-SIDED SCIATICA: ICD-10-CM

## 2025-08-11 DIAGNOSIS — K42.9 PERIUMBILICAL HERNIA: ICD-10-CM

## 2025-08-11 DIAGNOSIS — Z85.3 HISTORY OF BREAST CANCER IN FEMALE: ICD-10-CM

## 2025-08-11 DIAGNOSIS — M79.661 RIGHT CALF PAIN: ICD-10-CM

## 2025-08-11 DIAGNOSIS — Z00.00 ANNUAL PHYSICAL EXAM: Primary | ICD-10-CM

## 2025-08-11 DIAGNOSIS — G43.909 MIGRAINE WITHOUT STATUS MIGRAINOSUS, NOT INTRACTABLE, UNSPECIFIED MIGRAINE TYPE: ICD-10-CM

## 2025-08-11 DIAGNOSIS — G89.29 CHRONIC RIGHT-SIDED LOW BACK PAIN WITH RIGHT-SIDED SCIATICA: ICD-10-CM

## 2025-08-11 DIAGNOSIS — Z13.6 ENCOUNTER FOR LIPID SCREENING FOR CARDIOVASCULAR DISEASE: ICD-10-CM

## 2025-08-11 DIAGNOSIS — Z86.0100 HISTORY OF COLON POLYPS: ICD-10-CM

## 2025-08-11 PROCEDURE — 1159F MED LIST DOCD IN RCRD: CPT | Mod: CPTII,S$GLB,, | Performed by: FAMILY MEDICINE

## 2025-08-11 PROCEDURE — 3008F BODY MASS INDEX DOCD: CPT | Mod: CPTII,S$GLB,, | Performed by: FAMILY MEDICINE

## 2025-08-11 PROCEDURE — 3074F SYST BP LT 130 MM HG: CPT | Mod: CPTII,S$GLB,, | Performed by: FAMILY MEDICINE

## 2025-08-11 PROCEDURE — 99999 PR PBB SHADOW E&M-EST. PATIENT-LVL IV: CPT | Mod: PBBFAC,,, | Performed by: FAMILY MEDICINE

## 2025-08-11 PROCEDURE — 1160F RVW MEDS BY RX/DR IN RCRD: CPT | Mod: CPTII,S$GLB,, | Performed by: FAMILY MEDICINE

## 2025-08-11 PROCEDURE — 3078F DIAST BP <80 MM HG: CPT | Mod: CPTII,S$GLB,, | Performed by: FAMILY MEDICINE

## 2025-08-11 PROCEDURE — 99396 PREV VISIT EST AGE 40-64: CPT | Mod: S$GLB,,, | Performed by: FAMILY MEDICINE

## 2025-08-11 RX ORDER — MELOXICAM 15 MG/1
15 TABLET ORAL DAILY PRN
Qty: 30 TABLET | Refills: 0 | Status: SHIPPED | OUTPATIENT
Start: 2025-08-11

## 2025-08-14 ENCOUNTER — OFFICE VISIT (OUTPATIENT)
Dept: HEMATOLOGY/ONCOLOGY | Facility: CLINIC | Age: 55
End: 2025-08-14
Payer: COMMERCIAL

## 2025-08-14 ENCOUNTER — ANESTHESIA (OUTPATIENT)
Dept: SURGERY | Facility: HOSPITAL | Age: 55
End: 2025-08-14
Payer: COMMERCIAL

## 2025-08-14 ENCOUNTER — ANESTHESIA EVENT (OUTPATIENT)
Dept: SURGERY | Facility: HOSPITAL | Age: 55
End: 2025-08-14
Payer: COMMERCIAL

## 2025-08-14 ENCOUNTER — HOSPITAL ENCOUNTER (OUTPATIENT)
Facility: HOSPITAL | Age: 55
Discharge: HOME | End: 2025-08-14
Attending: SURGERY | Admitting: SURGERY
Payer: COMMERCIAL

## 2025-08-14 VITALS
WEIGHT: 117.75 LBS | HEIGHT: 63 IN | OXYGEN SATURATION: 100 % | SYSTOLIC BLOOD PRESSURE: 116 MMHG | TEMPERATURE: 97 F | BODY MASS INDEX: 20.86 KG/M2 | DIASTOLIC BLOOD PRESSURE: 73 MMHG | RESPIRATION RATE: 17 BRPM | HEART RATE: 62 BPM

## 2025-08-14 VITALS
TEMPERATURE: 98 F | DIASTOLIC BLOOD PRESSURE: 68 MMHG | SYSTOLIC BLOOD PRESSURE: 105 MMHG | HEART RATE: 56 BPM | BODY MASS INDEX: 20.55 KG/M2 | HEIGHT: 63 IN | OXYGEN SATURATION: 100 % | RESPIRATION RATE: 22 BRPM | WEIGHT: 116 LBS

## 2025-08-14 DIAGNOSIS — Z85.3 HISTORY OF BREAST CANCER IN FEMALE: Primary | ICD-10-CM

## 2025-08-14 DIAGNOSIS — Z79.811 PROPHYLACTIC USE OF ANASTROZOLE (ARIMIDEX): ICD-10-CM

## 2025-08-14 DIAGNOSIS — Z90.13 HISTORY OF MASTECTOMY, BILATERAL: ICD-10-CM

## 2025-08-14 DIAGNOSIS — K42.9 PERIUMBILICAL HERNIA: ICD-10-CM

## 2025-08-14 PROCEDURE — 63600175 PHARM REV CODE 636 W HCPCS: Performed by: NURSE ANESTHETIST, CERTIFIED REGISTERED

## 2025-08-14 PROCEDURE — 94761 N-INVAS EAR/PLS OXIMETRY MLT: CPT

## 2025-08-14 PROCEDURE — 63600175 PHARM REV CODE 636 W HCPCS: Performed by: SURGERY

## 2025-08-14 PROCEDURE — 99900035 HC TECH TIME PER 15 MIN (STAT)

## 2025-08-14 PROCEDURE — 49591 RPR AA HRN 1ST < 3 CM RDC: CPT | Mod: ,,, | Performed by: SURGERY

## 2025-08-14 PROCEDURE — 37000008 HC ANESTHESIA 1ST 15 MINUTES: Performed by: SURGERY

## 2025-08-14 PROCEDURE — 63600175 PHARM REV CODE 636 W HCPCS: Performed by: ANESTHESIOLOGY

## 2025-08-14 PROCEDURE — 99999 PR PBB SHADOW E&M-EST. PATIENT-LVL IV: CPT | Mod: PBBFAC,,, | Performed by: INTERNAL MEDICINE

## 2025-08-14 PROCEDURE — 71000016 HC POSTOP RECOV ADDL HR: Performed by: SURGERY

## 2025-08-14 PROCEDURE — 25000003 PHARM REV CODE 250: Performed by: ANESTHESIOLOGY

## 2025-08-14 PROCEDURE — 71000015 HC POSTOP RECOV 1ST HR: Performed by: SURGERY

## 2025-08-14 PROCEDURE — 36000706: Performed by: SURGERY

## 2025-08-14 PROCEDURE — 25000003 PHARM REV CODE 250: Performed by: NURSE ANESTHETIST, CERTIFIED REGISTERED

## 2025-08-14 PROCEDURE — 71000033 HC RECOVERY, INTIAL HOUR: Performed by: SURGERY

## 2025-08-14 PROCEDURE — 37000009 HC ANESTHESIA EA ADD 15 MINS: Performed by: SURGERY

## 2025-08-14 PROCEDURE — 36000707: Performed by: SURGERY

## 2025-08-14 RX ORDER — SODIUM CHLORIDE, SODIUM GLUCONATE, SODIUM ACETATE, POTASSIUM CHLORIDE AND MAGNESIUM CHLORIDE 30; 37; 368; 526; 502 MG/100ML; MG/100ML; MG/100ML; MG/100ML; MG/100ML
INJECTION, SOLUTION INTRAVENOUS CONTINUOUS PRN
Status: DISCONTINUED | OUTPATIENT
Start: 2025-08-14 | End: 2025-08-14

## 2025-08-14 RX ORDER — CEFAZOLIN 2 G/1
2 INJECTION, POWDER, FOR SOLUTION INTRAMUSCULAR; INTRAVENOUS
Status: COMPLETED | OUTPATIENT
Start: 2025-08-14 | End: 2025-08-14

## 2025-08-14 RX ORDER — FENTANYL CITRATE 50 UG/ML
INJECTION, SOLUTION INTRAMUSCULAR; INTRAVENOUS
Status: DISCONTINUED | OUTPATIENT
Start: 2025-08-14 | End: 2025-08-14

## 2025-08-14 RX ORDER — GLUCAGON 1 MG
1 KIT INJECTION
Status: DISCONTINUED | OUTPATIENT
Start: 2025-08-14 | End: 2025-08-14 | Stop reason: HOSPADM

## 2025-08-14 RX ORDER — TRAMADOL HYDROCHLORIDE 50 MG/1
50 TABLET, FILM COATED ORAL EVERY 6 HOURS
Qty: 10 TABLET | Refills: 0 | Status: SHIPPED | OUTPATIENT
Start: 2025-08-14

## 2025-08-14 RX ORDER — FENTANYL CITRATE 50 UG/ML
25 INJECTION, SOLUTION INTRAMUSCULAR; INTRAVENOUS EVERY 5 MIN PRN
Status: COMPLETED | OUTPATIENT
Start: 2025-08-14 | End: 2025-08-14

## 2025-08-14 RX ORDER — PROCHLORPERAZINE EDISYLATE 5 MG/ML
5 INJECTION INTRAMUSCULAR; INTRAVENOUS EVERY 30 MIN PRN
Status: DISCONTINUED | OUTPATIENT
Start: 2025-08-14 | End: 2025-08-14 | Stop reason: HOSPADM

## 2025-08-14 RX ORDER — ONDANSETRON HYDROCHLORIDE 2 MG/ML
4 INJECTION, SOLUTION INTRAVENOUS DAILY PRN
Status: DISCONTINUED | OUTPATIENT
Start: 2025-08-14 | End: 2025-08-14 | Stop reason: HOSPADM

## 2025-08-14 RX ORDER — ACETAMINOPHEN 10 MG/ML
INJECTION, SOLUTION INTRAVENOUS
Status: DISCONTINUED | OUTPATIENT
Start: 2025-08-14 | End: 2025-08-14

## 2025-08-14 RX ORDER — DEXAMETHASONE SODIUM PHOSPHATE 4 MG/ML
INJECTION, SOLUTION INTRA-ARTICULAR; INTRALESIONAL; INTRAMUSCULAR; INTRAVENOUS; SOFT TISSUE
Status: DISCONTINUED | OUTPATIENT
Start: 2025-08-14 | End: 2025-08-14

## 2025-08-14 RX ORDER — ROCURONIUM BROMIDE 10 MG/ML
INJECTION, SOLUTION INTRAVENOUS
Status: DISCONTINUED | OUTPATIENT
Start: 2025-08-14 | End: 2025-08-14

## 2025-08-14 RX ORDER — LIDOCAINE HYDROCHLORIDE AND EPINEPHRINE 10; 10 UG/ML; MG/ML
INJECTION, SOLUTION INFILTRATION; PERINEURAL
Status: DISCONTINUED | OUTPATIENT
Start: 2025-08-14 | End: 2025-08-14 | Stop reason: HOSPADM

## 2025-08-14 RX ORDER — SCOPOLAMINE 1 MG/3D
PATCH, EXTENDED RELEASE TRANSDERMAL
Status: DISCONTINUED
Start: 2025-08-14 | End: 2025-08-14 | Stop reason: HOSPADM

## 2025-08-14 RX ORDER — SCOPOLAMINE 1 MG/3D
PATCH, EXTENDED RELEASE TRANSDERMAL
Status: DISCONTINUED | OUTPATIENT
Start: 2025-08-14 | End: 2025-08-14

## 2025-08-14 RX ORDER — DEXMEDETOMIDINE HYDROCHLORIDE 100 UG/ML
INJECTION, SOLUTION INTRAVENOUS
Status: DISCONTINUED | OUTPATIENT
Start: 2025-08-14 | End: 2025-08-14

## 2025-08-14 RX ORDER — PROPOFOL 10 MG/ML
VIAL (ML) INTRAVENOUS
Status: DISCONTINUED | OUTPATIENT
Start: 2025-08-14 | End: 2025-08-14

## 2025-08-14 RX ORDER — LIDOCAINE HYDROCHLORIDE 20 MG/ML
INJECTION INTRAVENOUS
Status: DISCONTINUED | OUTPATIENT
Start: 2025-08-14 | End: 2025-08-14

## 2025-08-14 RX ORDER — BUPIVACAINE HYDROCHLORIDE 2.5 MG/ML
INJECTION, SOLUTION EPIDURAL; INFILTRATION; INTRACAUDAL; PERINEURAL
Status: DISCONTINUED | OUTPATIENT
Start: 2025-08-14 | End: 2025-08-14 | Stop reason: HOSPADM

## 2025-08-14 RX ORDER — HYDROMORPHONE HYDROCHLORIDE 1 MG/ML
0.2 INJECTION, SOLUTION INTRAMUSCULAR; INTRAVENOUS; SUBCUTANEOUS EVERY 5 MIN PRN
Status: DISCONTINUED | OUTPATIENT
Start: 2025-08-14 | End: 2025-08-14 | Stop reason: HOSPADM

## 2025-08-14 RX ORDER — ONDANSETRON 4 MG/1
8 TABLET, ORALLY DISINTEGRATING ORAL 2 TIMES DAILY
Qty: 10 TABLET | Refills: 0 | Status: SHIPPED | OUTPATIENT
Start: 2025-08-14

## 2025-08-14 RX ORDER — MIDAZOLAM HYDROCHLORIDE 1 MG/ML
INJECTION INTRAMUSCULAR; INTRAVENOUS
Status: DISCONTINUED | OUTPATIENT
Start: 2025-08-14 | End: 2025-08-14

## 2025-08-14 RX ORDER — ONDANSETRON HYDROCHLORIDE 2 MG/ML
INJECTION, SOLUTION INTRAVENOUS
Status: DISCONTINUED | OUTPATIENT
Start: 2025-08-14 | End: 2025-08-14

## 2025-08-14 RX ORDER — OXYCODONE AND ACETAMINOPHEN 5; 325 MG/1; MG/1
1 TABLET ORAL
Status: DISCONTINUED | OUTPATIENT
Start: 2025-08-14 | End: 2025-08-14 | Stop reason: HOSPADM

## 2025-08-14 RX ADMIN — PROPOFOL 50 MG: 10 INJECTION, EMULSION INTRAVENOUS at 12:08

## 2025-08-14 RX ADMIN — SCOPALAMINE 1 PATCH: 1 PATCH, EXTENDED RELEASE TRANSDERMAL at 11:08

## 2025-08-14 RX ADMIN — OXYCODONE HYDROCHLORIDE AND ACETAMINOPHEN 1 TABLET: 5; 325 TABLET ORAL at 12:08

## 2025-08-14 RX ADMIN — FENTANYL CITRATE 25 MCG: 50 INJECTION, SOLUTION INTRAMUSCULAR; INTRAVENOUS at 01:08

## 2025-08-14 RX ADMIN — SODIUM CHLORIDE, SODIUM GLUCONATE, SODIUM ACETATE, POTASSIUM CHLORIDE, MAGNESIUM CHLORIDE, SODIUM PHOSPHATE, DIBASIC, AND POTASSIUM PHOSPHATE: .53; .5; .37; .037; .03; .012; .00082 INJECTION, SOLUTION INTRAVENOUS at 12:08

## 2025-08-14 RX ADMIN — PROPOFOL 150 MG: 10 INJECTION, EMULSION INTRAVENOUS at 11:08

## 2025-08-14 RX ADMIN — ACETAMINOPHEN 1000 MG: 10 INJECTION INTRAVENOUS at 11:08

## 2025-08-14 RX ADMIN — MIDAZOLAM HYDROCHLORIDE 2 MG: 1 INJECTION, SOLUTION INTRAMUSCULAR; INTRAVENOUS at 11:08

## 2025-08-14 RX ADMIN — FENTANYL CITRATE 100 MCG: 50 INJECTION, SOLUTION INTRAMUSCULAR; INTRAVENOUS at 11:08

## 2025-08-14 RX ADMIN — HYDROMORPHONE HYDROCHLORIDE 0.2 MG: 1 INJECTION, SOLUTION INTRAMUSCULAR; INTRAVENOUS; SUBCUTANEOUS at 01:08

## 2025-08-14 RX ADMIN — DEXMEDETOMIDINE HYDROCHLORIDE 8 MCG: 100 INJECTION, SOLUTION INTRAVENOUS at 12:08

## 2025-08-14 RX ADMIN — CEFAZOLIN 2 G: 2 INJECTION, POWDER, FOR SOLUTION INTRAMUSCULAR; INTRAVENOUS at 11:08

## 2025-08-14 RX ADMIN — SODIUM CHLORIDE: 0.9 INJECTION, SOLUTION INTRAVENOUS at 11:08

## 2025-08-14 RX ADMIN — LIDOCAINE HYDROCHLORIDE 100 MG: 20 INJECTION INTRAVENOUS at 11:08

## 2025-08-14 RX ADMIN — SODIUM CHLORIDE, SODIUM GLUCONATE, SODIUM ACETATE, POTASSIUM CHLORIDE AND MAGNESIUM CHLORIDE 700 ML/HR: 526; 502; 368; 37; 30 INJECTION, SOLUTION INTRAVENOUS at 12:08

## 2025-08-14 RX ADMIN — ONDANSETRON 4 MG: 2 INJECTION INTRAMUSCULAR; INTRAVENOUS at 11:08

## 2025-08-14 RX ADMIN — SUGAMMADEX 200 MG: 100 INJECTION, SOLUTION INTRAVENOUS at 12:08

## 2025-08-14 RX ADMIN — DEXAMETHASONE SODIUM PHOSPHATE 8 MG: 4 INJECTION INTRA-ARTICULAR; INTRALESIONAL; INTRAMUSCULAR; INTRAVENOUS; SOFT TISSUE at 11:08

## 2025-08-14 RX ADMIN — DEXAMETHASONE SODIUM PHOSPHATE 4 MG: 4 INJECTION INTRA-ARTICULAR; INTRALESIONAL; INTRAMUSCULAR; INTRAVENOUS; SOFT TISSUE at 12:08

## 2025-08-14 RX ADMIN — FENTANYL CITRATE 25 MCG: 50 INJECTION, SOLUTION INTRAMUSCULAR; INTRAVENOUS at 12:08

## 2025-08-14 RX ADMIN — ROCURONIUM BROMIDE 50 MG: 10 INJECTION INTRAVENOUS at 11:08

## 2025-08-18 PROBLEM — Z98.890 HISTORY OF MELANOMA EXCISION: Status: RESOLVED | Noted: 2025-07-01 | Resolved: 2025-08-18

## 2025-08-18 PROBLEM — Z85.820 HISTORY OF MELANOMA EXCISION: Status: RESOLVED | Noted: 2025-07-01 | Resolved: 2025-08-18

## 2025-09-03 ENCOUNTER — OFFICE VISIT (OUTPATIENT)
Dept: SURGERY | Facility: CLINIC | Age: 55
End: 2025-09-03
Payer: COMMERCIAL

## 2025-09-03 VITALS
HEART RATE: 73 BPM | HEIGHT: 63 IN | BODY MASS INDEX: 20.62 KG/M2 | WEIGHT: 116.38 LBS | DIASTOLIC BLOOD PRESSURE: 60 MMHG | SYSTOLIC BLOOD PRESSURE: 109 MMHG

## 2025-09-03 DIAGNOSIS — K42.9 UMBILICAL HERNIA WITHOUT OBSTRUCTION AND WITHOUT GANGRENE: Primary | ICD-10-CM

## 2025-09-03 PROCEDURE — 1159F MED LIST DOCD IN RCRD: CPT | Mod: CPTII,S$GLB,, | Performed by: SURGERY

## 2025-09-03 PROCEDURE — 3074F SYST BP LT 130 MM HG: CPT | Mod: CPTII,S$GLB,, | Performed by: SURGERY

## 2025-09-03 PROCEDURE — 3078F DIAST BP <80 MM HG: CPT | Mod: CPTII,S$GLB,, | Performed by: SURGERY

## 2025-09-03 PROCEDURE — 1160F RVW MEDS BY RX/DR IN RCRD: CPT | Mod: CPTII,S$GLB,, | Performed by: SURGERY

## 2025-09-03 PROCEDURE — 99999 PR PBB SHADOW E&M-EST. PATIENT-LVL III: CPT | Mod: PBBFAC,,, | Performed by: SURGERY

## 2025-09-03 PROCEDURE — 99212 OFFICE O/P EST SF 10 MIN: CPT | Mod: S$GLB,,, | Performed by: SURGERY

## 2025-09-03 PROCEDURE — 3008F BODY MASS INDEX DOCD: CPT | Mod: CPTII,S$GLB,, | Performed by: SURGERY

## (undated) DEVICE — SUT VICRYL 3-0 27 SH

## (undated) DEVICE — DRAPE LAP T SHT W/ INSTR PAD

## (undated) DEVICE — SEAL LENS SCOPE MYOSURE

## (undated) DEVICE — SUT MCRYL PLUS 4-0 PS2 27IN

## (undated) DEVICE — PACK FLUENT DISPOSABLE

## (undated) DEVICE — VIDEO RENTAL SERVICE

## (undated) DEVICE — GOWN ECLIPSE REINF LVL4 TWL XL

## (undated) DEVICE — SUT PROLENE 0 MO6 30IN BLUE

## (undated) DEVICE — SOL PVP-I SCRUB 7.5% 4OZ

## (undated) DEVICE — GLOVE BIOGEL SKINSENSE PI 6.0

## (undated) DEVICE — SUT MONOCRYL 4-0 PS-2

## (undated) DEVICE — ELECTRODE REM PLYHSV RETURN 9

## (undated) DEVICE — COVER LIGHT HANDLE 80/CA

## (undated) DEVICE — SET BASIN 48X48IN 6000ML RING

## (undated) DEVICE — YANKAUER OPEN TIP W/O VENT

## (undated) DEVICE — Device

## (undated) DEVICE — SOL NACL IRR 3000ML

## (undated) DEVICE — GLOVE BIOGEL SKINSENSE PI 6.5

## (undated) DEVICE — UNDERGLOVES BIOGEL PI SIZE 8

## (undated) DEVICE — GLOVE BIOGEL PI MICRO SZ 7.5

## (undated) DEVICE — JELLY SURGILUBE 5GR

## (undated) DEVICE — SOL POVIDONE PREP IODINE 4 OZ

## (undated) DEVICE — DEVICE MYOSURE REACH

## (undated) DEVICE — TRAY MINOR GEN SURG OMC

## (undated) DEVICE — SOL IRR SOD CHL .9% POUR

## (undated) DEVICE — GAUZE WOVEN STRL 12-PLY 4X4IN